# Patient Record
Sex: FEMALE | Race: WHITE | Employment: FULL TIME | URBAN - METROPOLITAN AREA
[De-identification: names, ages, dates, MRNs, and addresses within clinical notes are randomized per-mention and may not be internally consistent; named-entity substitution may affect disease eponyms.]

---

## 2017-01-06 ENCOUNTER — ALLIED HEALTH/NURSE VISIT (OUTPATIENT)
Dept: FAMILY MEDICINE | Facility: CLINIC | Age: 32
End: 2017-01-06
Payer: COMMERCIAL

## 2017-01-06 VITALS
BODY MASS INDEX: 32.59 KG/M2 | WEIGHT: 202.8 LBS | DIASTOLIC BLOOD PRESSURE: 78 MMHG | SYSTOLIC BLOOD PRESSURE: 132 MMHG | HEIGHT: 66 IN

## 2017-01-06 DIAGNOSIS — N91.2 AMENORRHEA: Primary | ICD-10-CM

## 2017-01-06 LAB — BETA HCG QUAL IFA URINE: POSITIVE

## 2017-01-06 PROCEDURE — 99207 ZZC NO CHARGE NURSE ONLY: CPT

## 2017-01-06 PROCEDURE — 84703 CHORIONIC GONADOTROPIN ASSAY: CPT | Mod: 90 | Performed by: FAMILY MEDICINE

## 2017-01-06 PROCEDURE — 99000 SPECIMEN HANDLING OFFICE-LAB: CPT | Performed by: FAMILY MEDICINE

## 2017-01-06 RX ORDER — PRENATAL VIT/IRON FUM/FOLIC AC 27MG-0.8MG
1 TABLET ORAL DAILY
Qty: 100 TABLET | Refills: 3 | COMMUNITY
Start: 2017-01-06 | End: 2017-02-20

## 2017-01-06 NOTE — NURSING NOTE
Shari Arshad is a 31 year old here today for a pregnancy test.  LMP: Patient's last menstrual period was 2016 (exact date).  Wt: 202 lbs 12.8 oz.    Symptoms include breast tenderness, absence of menses, fatigue, constipation, mild craming and heartburn.  Craving pickles and cheese.    Shari informed of positive pregnancy test results. MIKIE: 17    Educational advice given: nutrition, smoking and drugs & alcohol.  Smokes has been cutting back, is hoping to be done by Monday.    Current medications reviewed: Yes    Previous pregnancy history remarkable for: N/A.    Plan: schedule appointment with OB Educator and/or OB class on way out of clinic, follow-up appointment with Dr. Mcdonald for pre-syl care, take multivitamin or pre-syl vitamins and OB Education packet given.    She is to call back if she has any questions or concerns.  She is advised to notify a provider immediately if she experiences any severe cramping or abdominal pain or any vaginal bleeding.    Routing to Dr. Mcdonald as an FYI.    Bud Lorenzo RN, BSN

## 2017-01-09 ENCOUNTER — PRENATAL OFFICE VISIT (OUTPATIENT)
Dept: FAMILY MEDICINE | Facility: CLINIC | Age: 32
End: 2017-01-09
Payer: COMMERCIAL

## 2017-01-09 VITALS — BODY MASS INDEX: 34.16 KG/M2 | HEIGHT: 65 IN | WEIGHT: 205 LBS

## 2017-01-09 DIAGNOSIS — Z34.00 PREGNANCY, FIRST: Primary | ICD-10-CM

## 2017-01-09 PROBLEM — Z29.89 NEED FOR PROPHYLACTIC IMMUNOTHERAPY: Status: ACTIVE | Noted: 2017-01-09

## 2017-01-09 LAB
ALBUMIN UR-MCNC: NEGATIVE MG/DL
APPEARANCE UR: CLEAR
BILIRUB UR QL STRIP: NEGATIVE
COLOR UR AUTO: YELLOW
ERYTHROCYTE [DISTWIDTH] IN BLOOD BY AUTOMATED COUNT: 12.6 % (ref 10–15)
GLUCOSE UR STRIP-MCNC: NEGATIVE MG/DL
HCT VFR BLD AUTO: 43.1 % (ref 35–47)
HGB BLD-MCNC: 14.6 G/DL (ref 11.7–15.7)
HGB UR QL STRIP: NEGATIVE
KETONES UR STRIP-MCNC: NEGATIVE MG/DL
LEUKOCYTE ESTERASE UR QL STRIP: NEGATIVE
MCH RBC QN AUTO: 32.4 PG (ref 26.5–33)
MCHC RBC AUTO-ENTMCNC: 33.9 G/DL (ref 31.5–36.5)
MCV RBC AUTO: 96 FL (ref 78–100)
NITRATE UR QL: NEGATIVE
PH UR STRIP: 6.5 PH (ref 5–7)
PLATELET # BLD AUTO: 221 10E9/L (ref 150–450)
RBC # BLD AUTO: 4.5 10E12/L (ref 3.8–5.2)
SP GR UR STRIP: 1.01 (ref 1–1.03)
URN SPEC COLLECT METH UR: NORMAL
UROBILINOGEN UR STRIP-ACNC: 0.2 EU/DL (ref 0.2–1)
WBC # BLD AUTO: 10.5 10E9/L (ref 4–11)

## 2017-01-09 PROCEDURE — 81003 URINALYSIS AUTO W/O SCOPE: CPT | Mod: 90 | Performed by: FAMILY MEDICINE

## 2017-01-09 PROCEDURE — 86762 RUBELLA ANTIBODY: CPT | Mod: 90 | Performed by: FAMILY MEDICINE

## 2017-01-09 PROCEDURE — 86900 BLOOD TYPING SEROLOGIC ABO: CPT | Mod: 90 | Performed by: FAMILY MEDICINE

## 2017-01-09 PROCEDURE — 99000 SPECIMEN HANDLING OFFICE-LAB: CPT | Performed by: FAMILY MEDICINE

## 2017-01-09 PROCEDURE — 99207 ZZC NO CHARGE NURSE ONLY: CPT

## 2017-01-09 PROCEDURE — 87086 URINE CULTURE/COLONY COUNT: CPT | Mod: 90 | Performed by: FAMILY MEDICINE

## 2017-01-09 PROCEDURE — 87389 HIV-1 AG W/HIV-1&-2 AB AG IA: CPT | Mod: 90 | Performed by: FAMILY MEDICINE

## 2017-01-09 PROCEDURE — 86901 BLOOD TYPING SEROLOGIC RH(D): CPT | Mod: 90 | Performed by: FAMILY MEDICINE

## 2017-01-09 PROCEDURE — 86850 RBC ANTIBODY SCREEN: CPT | Mod: 90 | Performed by: FAMILY MEDICINE

## 2017-01-09 PROCEDURE — 36415 COLL VENOUS BLD VENIPUNCTURE: CPT | Performed by: FAMILY MEDICINE

## 2017-01-09 PROCEDURE — 87340 HEPATITIS B SURFACE AG IA: CPT | Mod: 90 | Performed by: FAMILY MEDICINE

## 2017-01-09 PROCEDURE — 86780 TREPONEMA PALLIDUM: CPT | Mod: 90 | Performed by: FAMILY MEDICINE

## 2017-01-09 PROCEDURE — 85027 COMPLETE CBC AUTOMATED: CPT | Mod: 90 | Performed by: FAMILY MEDICINE

## 2017-01-09 NOTE — PROGRESS NOTES
1. Have you had chicken pox?   Yes    Genetic Screening    Has the patient, baby's father, or anyone in either family had:     1. Thalassemia and and MCV result less than 80?No   2.  Neural tube defect? No   3.  Congenital heart defect?No   4. Down's syndrome?No   5.  Erich-Sachs disease?No   6. Sickle Cell disease or trait?No   7. Hemophilia or other inherited problems of blood?No   8. Muscular dystropy?No   9.  Cystic fibrosis?No  10. Axis's Chorea?No  11. Mental Retardation or autism?  No         If yes, was the person tested for Fragile X?   12. Any other inherited genetic or chromosomal disorders?No  13. Metabolic disorders such as diabetes or PKU?No  14. A child born with defects not listed above?  Shari's sister had multiple fractures of her foot in early childhood.  Unknown cause.  Her foot was removed age 2.    15. Recurrent pregnancy loss or stillbirth?No  16.  Has the patient had any medications/street drugs/alcohol since her last menstrual period?No  18.  Does the patient or baby's father have any other genetic risks. No    Shari's cousin had twins.

## 2017-01-10 LAB
ABO + RH BLD: NORMAL
ABO + RH BLD: NORMAL
BLD GP AB SCN SERPL QL: NORMAL
BLOOD BANK CMNT PATIENT-IMP: NORMAL
SPECIMEN EXP DATE BLD: NORMAL

## 2017-01-11 ENCOUNTER — MYC MEDICAL ADVICE (OUTPATIENT)
Dept: FAMILY MEDICINE | Facility: CLINIC | Age: 32
End: 2017-01-11

## 2017-01-11 LAB
BACTERIA SPEC CULT: NORMAL
HBV SURFACE AG SERPL QL IA: NONREACTIVE
HIV 1+2 AB+HIV1 P24 AG SERPL QL IA: NORMAL
MICRO REPORT STATUS: NORMAL
RUBV IGG SERPL IA-ACNC: 17 IU/ML
SPECIMEN SOURCE: NORMAL
T PALLIDUM IGG+IGM SER QL: NEGATIVE

## 2017-01-31 ENCOUNTER — HOSPITAL ENCOUNTER (OUTPATIENT)
Dept: ULTRASOUND IMAGING | Facility: CLINIC | Age: 32
Discharge: HOME | End: 2017-01-31
Attending: FAMILY MEDICINE | Admitting: FAMILY MEDICINE
Payer: COMMERCIAL

## 2017-01-31 DIAGNOSIS — Z34.00 PREGNANCY, FIRST: ICD-10-CM

## 2017-01-31 PROCEDURE — 76801 OB US < 14 WKS SINGLE FETUS: CPT

## 2017-02-20 ENCOUNTER — PRENATAL OFFICE VISIT (OUTPATIENT)
Dept: FAMILY MEDICINE | Facility: CLINIC | Age: 32
End: 2017-02-20
Payer: COMMERCIAL

## 2017-02-20 ENCOUNTER — TELEPHONE (OUTPATIENT)
Dept: FAMILY MEDICINE | Facility: CLINIC | Age: 32
End: 2017-02-20

## 2017-02-20 VITALS
TEMPERATURE: 98.1 F | DIASTOLIC BLOOD PRESSURE: 80 MMHG | OXYGEN SATURATION: 99 % | BODY MASS INDEX: 35.28 KG/M2 | WEIGHT: 212 LBS | HEART RATE: 77 BPM | SYSTOLIC BLOOD PRESSURE: 117 MMHG | RESPIRATION RATE: 20 BRPM

## 2017-02-20 DIAGNOSIS — Z34.01 ENCOUNTER FOR SUPERVISION OF NORMAL FIRST PREGNANCY IN FIRST TRIMESTER: Primary | ICD-10-CM

## 2017-02-20 DIAGNOSIS — Z23 NEED FOR VACCINATION: ICD-10-CM

## 2017-02-20 DIAGNOSIS — Z23 NEED FOR PROPHYLACTIC VACCINATION AND INOCULATION AGAINST INFLUENZA: ICD-10-CM

## 2017-02-20 PROBLEM — Z34.00 SUPERVISION OF NORMAL FIRST PREGNANCY: Status: ACTIVE | Noted: 2017-02-20

## 2017-02-20 PROCEDURE — 90471 IMMUNIZATION ADMIN: CPT | Performed by: FAMILY MEDICINE

## 2017-02-20 PROCEDURE — 99207 ZZC FIRST OB VISIT: CPT | Mod: 25 | Performed by: FAMILY MEDICINE

## 2017-02-20 PROCEDURE — 90688 IIV4 VACCINE SPLT 0.5 ML IM: CPT | Performed by: FAMILY MEDICINE

## 2017-02-20 RX ORDER — PRENATAL VIT/IRON FUM/FOLIC AC 27MG-0.8MG
1 TABLET ORAL DAILY
Qty: 100 TABLET | Refills: 3 | Status: SHIPPED | OUTPATIENT
Start: 2017-02-20 | End: 2018-02-21

## 2017-02-20 ASSESSMENT — PAIN SCALES - GENERAL: PAINLEVEL: NO PAIN (0)

## 2017-02-20 NOTE — MR AVS SNAPSHOT
After Visit Summary   2/20/2017    Shari Arshad    MRN: [id number]           Patient Information     Date Of Birth          1985        Visit Information        Provider Department      2/20/2017 4:00 PM Zechariah Mcdonald MD [facility]        Today's Diagnoses     Encounter for supervision of normal first pregnancy in first trimester    -  1    Need for vaccination        Need for prophylactic vaccination and inoculation against influenza           Follow-ups after your visit        Your next 10 appointments already scheduled     Mar 28, 2017  3:40 PM CDT   ESTABLISHED PRENATAL with Pratibha Ruiz Mai, MD   [facility] ([facility])    [address] [place] [id number]   [phone]            Apr 25, 2017  1:00 PM CDT   ESTABLISHED PRENATAL with Zechariah Mcdonald MD   [facility] ([facility])    [address] [place] [id number]   [phone]              Future tests that were ordered for you today     Open Future Orders        Priority Expected Expires Ordered    Maternal quad screen Routine 4/3/2017 4/3/2017 2/20/2017            Who to contact     If you have questions or need follow up information about today's clinic visit or your schedule please contact [facility] directly at [phone].  Normal or non-critical lab and imaging results will be communicated to you by Aylahart, letter or phone within 4 business days after the clinic has received the results. If you do not hear from us within 7 days, please contact the clinic through Aylahart or phone. If you have a critical or abnormal lab result, we will notify you by phone as soon as possible.  Submit refill requests through [employer] or call your pharmacy and they will forward the refill request to us. Please allow 3 business days for your refill to be completed.          Additional Information About Your Visit        [facility]  Information     [employer] gives you secure access to your electronic health record. If you see a primary care provider, you can also send messages to your care team and make appointments. If you have questions, please call your primary care clinic.  If you do not have a primary care provider, please call [phone] and they will assist you.        Care EveryWhere ID     This is your Care EveryWhere ID. This could be used by other organizations to access your [facility] medical records  [id number]        Your Vitals Were     Pulse Temperature Respirations Last Period Pulse Oximetry BMI (Body Mass Index)    77 98.1  F (36.7  C) (Temporal) 20 12/06/2016 (Exact Date) 99% 35.28 kg/m2       Blood Pressure from Last 3 Encounters:   02/20/17 117/80   01/06/17 132/78   08/11/16 134/86    Weight from Last 3 Encounters:   02/20/17 212 lb (96.2 kg)   01/09/17 205 lb (93 kg)   01/06/17 202 lb 12.8 oz (92 kg)              We Performed the Following     FLU VACCINE, 3 YRS +, IM (QUADRIVALENT W/PRESERVATIVES/MULTI-DOSE) [85008]     Vaccine Administration, Initial [59746]          Today's Medication Changes          These changes are accurate as of: 2/20/17  8:22 PM.  If you have any questions, ask your nurse or doctor.               Stop taking these medicines if you haven't already. Please contact your care team if you have questions.     psyllium 0.52 G capsule   Stopped by:  Zechariah Mcdonald MD                Where to get your medicines      These medications were sent to [id number] - [place] - [address]  [address] [place] [address]     Phone:  [phone]     prenatal multivitamin  plus iron 27-0.8 MG Tabs per tablet                Primary Care Provider    None Specified       No primary provider on file.        Thank you!     Thank you for choosing [facility]  for your care. Our goal is always to provide you with excellent care. Hearing back from our patients is one way we can continue to  improve our services. Please take a few minutes to complete the written survey that you may receive in the mail after your visit with us. Thank you!             Your Updated Medication List - Protect others around you: Learn how to safely use, store and throw away your medicines at www.disposemymeds.org.          This list is accurate as of: 2/20/17  8:22 PM.  Always use your most recent med list.                   Brand Name Dispense Instructions for use    prenatal multivitamin  plus iron 27-0.8 MG Tabs per tablet     100 tablet    Take 1 tablet by mouth daily

## 2017-02-20 NOTE — PROGRESS NOTES
INITIAL OB ASSESSMENT                     Date: 2017  Age: 31 year old   Plan Number: [id number]  Name: Shari Arshad    : 1985  Phone: [phone] (home)  Marital Status:co-habitating,    Race/Ethnicity:    PC Provider:  No primary care provider on file.    OB Physician: AF   Partner's Name: Errol        See Specialty History for details.    LMP: 16, Cycle length: 28-30 days  LMP Certain?:Yes  LMP Normal?: Yes     Allergies   Allergen Reactions     Fruit Extracts Hives     Fruits from trees only if she touches them     Milk [Lac Bovis] GI Disturbance     No Known Drug Allergies         Current Outpatient Prescriptions   Medication Sig Dispense Refill     Prenatal Vit-Fe Fumarate-FA (PRENATAL MULTIVITAMIN  PLUS IRON) 27-0.8 MG TABS per tablet Take 1 tablet by mouth daily 100 tablet 3     Social History: Benign, No substance abuse, environmental exposures, mental health risks and No financial concerns,pets. Partner support.     Past Medical History of Father of Baby:   No significant medical history     Past Medical History of Patient:  Past Medical History   Diagnosis Date     Gastro-oesophageal reflux disease      Hypertension      Lactose intolerance      Rh(-), needs rhogam at 28 wks gestation 2017     Surgical History:       Past Surgical History   Procedure Laterality Date     Hc excis primary ganglion wrist  3/24/2004     Excision, volar wrist ganglion, left.     Hc excis recurrent ganglion wrist  2006     Recurrent volar wrist ganglion, left.     Open reduction internal fixation wrist       Cadavar bone     Family History:   Family History   Problem Relation Age of Onset     Lipids Mother      Musculoskeletal Disorder Mother      ms     Allergies Mother      Depression Mother      Genitourinary Problems Mother      Respiratory Mother      CPAP for sleep apnea     Multiple Sclerosis Mother      Hypertension Father      Lipids Father      Hypertension Paternal Grandmother       Hypertension Paternal Grandfather      CEREBROVASCULAR DISEASE Maternal Grandmother      CANCER Maternal Grandmother      ovarian     Gynecology Maternal Grandmother      ovarian ca     Asthma Sister      Psychotic Disorder Sister      depression (s/p an amputation at age 3)     Respiratory Sister      CPAP for sleep apnea     DIABETES Sister      adult-onset     DIABETES Maternal Uncle      type 2     Genetic History: No known genectic disease in 1st or 2nd degree relatives and No known genectic disease in FOB's 1st ot 2nd degree relatives     Review of Systems: General: Benign and Pre-pregnancy weight 205#;  Cardiovascular:Benign,    Respiratory: Benign;  Gastrointestinal Benign;  Genitourinary: Benign;  Neuromuscular: Benign;   Endocrine: Benign other than feeling tired;  Dermatologic Benign;   Psychiatric: Benign.     History Since Last Menstrual Period: No Problems      Physical Exam: General: Well developed, well nourished female;   Skin: Normal;   HEENT: PERRLA, EOMI, fundi benign;   Neck: Supple, no adenopathy and thyroid normal;   Chest: Clear to auscultation;   Heart: Regular rate, rhythm and No murmur, rub, gallop;  Breasts: Not examined;   Abdomen: Benign, Soft, flat, non-tender, No masses, organomegaly, No inguinal nodes, Bowel sounds normoactive and FHR in the 170s;   Extremities: Normal and No edema;  Neurological: Normal;   Perineum: Intact;   Vulva: Normal genitalia;  Vagina: Normal mucosa, no discharge;   Cervix: Nulliparous, closed, mobile,  no discharge and Length 5 cm;  Uterus: 11-12 weeks, Normal shape, position and consistency, Anteflexed and Nontender;   Adnexa: No masses, nodularity, tenderness;  Rectum: NE;   Bony Pelvis: Adequate and Gynecoid.   Pelvimetry: Diagonal Conjugate: 10 cm, Pubic angle >90 degrees, Ischial spines flat and Interspinal distance:  9 cm    Assessment:   IUP in a primiparous patient happy to be pregnant.  Smoking but has cut down to about 4 per day and will quit  altogether.      Plan:  Follow up in 5 weeks.  Normal exercise.  Normal sexual activity.  Prenatal vitamins.  Anticipated weight gain.  Discussed AFP quad screen and when it would be drawn (16 wks).  Patient will discuss this with her partner and decide whether or not she wants it done. She thinks that she will have it done.     Electronically signed by:  Zechariah Mcdonald M.D.  2/20/2017

## 2017-02-20 NOTE — NURSING NOTE
"Chief Complaint   Patient presents with     Prenatal Care     10 weeks 6 days       Initial /80 (BP Location: Right arm, Patient Position: Chair, Cuff Size: Adult Large)  Pulse 77  Temp 98.1  F (36.7  C) (Temporal)  Resp 20  Wt 212 lb (96.2 kg)  LMP 12/06/2016 (Exact Date)  SpO2 99%  BMI 35.28 kg/m2 Estimated body mass index is 35.28 kg/(m^2) as calculated from the following:    Height as of 1/9/17: 5' 5\" (1.651 m).    Weight as of this encounter: 212 lb (96.2 kg).  Medication Reconciliation: complete     Altagracia Stephenson CMA      "

## 2017-02-20 NOTE — TELEPHONE ENCOUNTER
Patient is wondering if you will prescribe her some prenatal vitamins so she can use her flex spending account money. They do not allow her to use it for OTC medications.    She would like them sent to [facility].    Altagracia Stephenson, [facility]

## 2017-02-20 NOTE — PROGRESS NOTES
Injectable Influenza Immunization Documentation    1.  Is the person to be vaccinated sick today?  No    2. Does the person to be vaccinated have an allergy to eggs or to a component of the vaccine?  No    3. Has the person to be vaccinated today ever had a serious reaction to influenza vaccine in the past?  No    4. Has the person to be vaccinated ever had Guillain-[place] syndrome?  No     Form completed by Altagracia Stephenson CMA

## 2017-03-28 ENCOUNTER — PRENATAL OFFICE VISIT (OUTPATIENT)
Dept: FAMILY MEDICINE | Facility: CLINIC | Age: 32
End: 2017-03-28
Payer: COMMERCIAL

## 2017-03-28 VITALS
TEMPERATURE: 97.3 F | RESPIRATION RATE: 14 BRPM | DIASTOLIC BLOOD PRESSURE: 72 MMHG | HEART RATE: 69 BPM | OXYGEN SATURATION: 96 % | BODY MASS INDEX: 35.61 KG/M2 | WEIGHT: 214 LBS | SYSTOLIC BLOOD PRESSURE: 120 MMHG

## 2017-03-28 DIAGNOSIS — K21.9 GASTROESOPHAGEAL REFLUX DISEASE WITHOUT ESOPHAGITIS: ICD-10-CM

## 2017-03-28 DIAGNOSIS — Z34.01 ENCOUNTER FOR SUPERVISION OF NORMAL FIRST PREGNANCY IN FIRST TRIMESTER: ICD-10-CM

## 2017-03-28 DIAGNOSIS — Z34.92 PRENATAL CARE, SECOND TRIMESTER: Primary | ICD-10-CM

## 2017-03-28 PROCEDURE — 99000 SPECIMEN HANDLING OFFICE-LAB: CPT | Performed by: FAMILY MEDICINE

## 2017-03-28 PROCEDURE — 81511 FTL CGEN ABNOR FOUR ANAL: CPT | Mod: 90 | Performed by: FAMILY MEDICINE

## 2017-03-28 PROCEDURE — 36415 COLL VENOUS BLD VENIPUNCTURE: CPT | Performed by: FAMILY MEDICINE

## 2017-03-28 PROCEDURE — 99207 ZZC PRENATAL VISIT: CPT | Performed by: FAMILY MEDICINE

## 2017-03-28 ASSESSMENT — PAIN SCALES - GENERAL: PAINLEVEL: NO PAIN (0)

## 2017-03-28 NOTE — PROGRESS NOTES
Doing well and she has no concern.  Started to feel some fetal movement.  No cramping or abnormal discharge. No N/V with normal appetite. No UTI symptoms or bleeding. Has GERD and sameer is no longer effective. No other concern today.    Reviewed the prenatal flow sheet with her    Overall she is doing well.  Continue with current care; continue with prenatal vitamin daily and drinks a lot of water.  Educate about what to expect in the next 4 weeks and symptoms to call in or be seen.  Normal activities as tolerated.  Will get the Quad screen test today.  Set up for ultrasound for anatomy survey and recommended to get it done around week 20 to 22.  Follow up in 4 week with Dr. Mcdonald, earlier as needed.   All of her questions answered.

## 2017-03-28 NOTE — NURSING NOTE
"Chief Complaint   Patient presents with     Prenatal Care       Initial /72 (BP Location: Left arm, Patient Position: Chair, Cuff Size: Adult Regular)  Pulse 69  Temp 97.3  F (36.3  C) (Temporal)  Resp 14  Wt 214 lb (97.1 kg)  LMP 12/06/2016 (Exact Date)  SpO2 96%  BMI 35.61 kg/m2 Estimated body mass index is 35.61 kg/(m^2) as calculated from the following:    Height as of 1/9/17: 5' 5\" (1.651 m).    Weight as of this encounter: 214 lb (97.1 kg).  Medication Reconciliation: complete   Quyen Hamilton MA 3/28/2017        "

## 2017-03-30 LAB
# FETUSES US: NORMAL
AFP ADJ MOM AMN: 1.35
AFP SERPL-MCNC: 35 NG/ML
AGE - REPORTED: 32.8
DATING METHOD: NORMAL
DIABETIC AT CONCEPTION: NO
FAMILY MEMBER DISEASES HX: NO
FAMILY MEMBER DISEASES HX: NO
GA METHOD: NORMAL
GA: 16 WK
HCG MOM SERPL: 0.6
HCG SERPL-ACNC: NORMAL M[IU]/ML
HX OF HEREDITARY DISORDERS: NO
IDDM PATIENT QL: NO
INHIBIN A MOM SERPL: 0.92
INHIBIN A SERPL-MCNC: 136
INTEGRATED SCN PATIENT-IMP: NORMAL
LMP START DATE: NORMAL
PATHOLOGY STUDY: NORMAL
PREV HX CHROMOSOME ABNORMALITY: NO
SPECIMEN DRAWN SERPL: NORMAL
TWINS: NO
U ESTRIOL MOM SERPL: 1
U ESTRIOL SERPL-MCNC: 0.77 NG/ML

## 2017-04-25 ENCOUNTER — PRENATAL OFFICE VISIT (OUTPATIENT)
Dept: FAMILY MEDICINE | Facility: CLINIC | Age: 32
End: 2017-04-25
Payer: COMMERCIAL

## 2017-04-25 ENCOUNTER — HOSPITAL ENCOUNTER (OUTPATIENT)
Dept: ULTRASOUND IMAGING | Facility: CLINIC | Age: 32
Discharge: HOME | End: 2017-04-25
Attending: FAMILY MEDICINE | Admitting: FAMILY MEDICINE
Payer: COMMERCIAL

## 2017-04-25 VITALS
TEMPERATURE: 98.1 F | WEIGHT: 220.12 LBS | HEART RATE: 86 BPM | HEIGHT: 65 IN | DIASTOLIC BLOOD PRESSURE: 66 MMHG | SYSTOLIC BLOOD PRESSURE: 112 MMHG | OXYGEN SATURATION: 98 % | RESPIRATION RATE: 18 BRPM | BODY MASS INDEX: 36.67 KG/M2

## 2017-04-25 DIAGNOSIS — Z34.92 PRENATAL CARE, SECOND TRIMESTER: ICD-10-CM

## 2017-04-25 DIAGNOSIS — Z34.02 ENCOUNTER FOR SUPERVISION OF NORMAL FIRST PREGNANCY IN SECOND TRIMESTER: Primary | ICD-10-CM

## 2017-04-25 PROCEDURE — 76805 OB US >/= 14 WKS SNGL FETUS: CPT

## 2017-04-25 PROCEDURE — 99207 ZZC PRENATAL VISIT: CPT | Performed by: FAMILY MEDICINE

## 2017-04-25 NOTE — NURSING NOTE
"Chief Complaint   Patient presents with     RECHECK     OB 20 weeks 0 days       Initial /66  Pulse 86  Temp 98.1  F (36.7  C) (Temporal)  Resp 18  Ht 5' 5\" (1.651 m)  Wt 220 lb 1.9 oz (99.8 kg)  LMP 12/06/2016 (Exact Date)  SpO2 98%  BMI 36.63 kg/m2 Estimated body mass index is 36.63 kg/(m^2) as calculated from the following:    Height as of this encounter: 5' 5\" (1.651 m).    Weight as of this encounter: 220 lb 1.9 oz (99.8 kg).  Medication Reconciliation: incomplete   Ashish FALL CMA      "

## 2017-04-25 NOTE — PROGRESS NOTES
Taking PNVs, no problems. Having a baby girl.  1 hr glucola treponema and hgb at her next visit.  Instructed on not to eat or drink anything more than water for at least 2 hours prior to the test.   She will need her rhogam the following month at 28 wks.  She has been feeling a few fetal movements over the past few days.  We did review her AFP quad screen.  It was completely normal.      Electronically signed by:  Zechariah Mcdonald M.D.  4/25/2017

## 2017-04-25 NOTE — MR AVS SNAPSHOT
"              After Visit Summary   2017    Shari Arshad    MRN: [id number]           Patient Information     Date Of Birth          1985        Visit Information        Provider Department      2017 1:00 PM Zechariah Mcdonald MD [facility]        Today's Diagnoses     Encounter for supervision of normal first pregnancy in second trimester    -  1       Follow-ups after your visit        Who to contact     If you have questions or need follow up information about today's clinic visit or your schedule please contact [facility] directly at [phone].  Normal or non-critical lab and imaging results will be communicated to you by Subtextualhart, letter or phone within 4 business days after the clinic has received the results. If you do not hear from us within 7 days, please contact the clinic through Subtextualhart or phone. If you have a critical or abnormal lab result, we will notify you by phone as soon as possible.  Submit refill requests through [employer] or call your pharmacy and they will forward the refill request to us. Please allow 3 business days for your refill to be completed.          Additional Information About Your Visit        MyChart Information     [employer] lets you send messages to your doctor, view your test results, renew your prescriptions, schedule appointments and more. To sign up, go to [email or web address] . Click on \"Log in\" on the left side of the screen, which will take you to the Welcome page. Then click on \"Sign up Now\" on the right side of the page.     You will be asked to enter the access code listed below, as well as some personal information. Please follow the directions to create your username and password.     Your access code is: [email or web address]  Expires: 2017  1:14 PM     Your access code will  in 90 days. If you need help or a new code, please call your [facility] or [phone].        Care EveryWhere ID     This is your " "Care EveryWhere ID. This could be used by other organizations to access your [facility] medical records  [id number]        Your Vitals Were     Pulse Temperature Respirations Height Last Period Pulse Oximetry    86 98.1  F (36.7  C) (Temporal) 18 5' 5\" (1.651 m) 12/06/2016 (Exact Date) 98%    BMI (Body Mass Index)                   36.63 kg/m2            Blood Pressure from Last 3 Encounters:   04/25/17 112/66   03/28/17 120/72   02/20/17 117/80    Weight from Last 3 Encounters:   04/25/17 220 lb 1.9 oz (99.8 kg)   03/28/17 214 lb (97.1 kg)   02/20/17 212 lb (96.2 kg)              Today, you had the following     No orders found for display       Primary Care Provider    None Specified       No primary provider on file.        Thank you!     Thank you for choosing [facility]  for your care. Our goal is always to provide you with excellent care. Hearing back from our patients is one way we can continue to improve our services. Please take a few minutes to complete the written survey that you may receive in the mail after your visit with us. Thank you!             Your Updated Medication List - Protect others around you: Learn how to safely use, store and throw away your medicines at www.disposemymeds.org.          This list is accurate as of: 4/25/17  1:14 PM.  Always use your most recent med list.                   Brand Name Dispense Instructions for use    prenatal multivitamin  plus iron 27-0.8 MG Tabs per tablet     100 tablet    Take 1 tablet by mouth daily       ranitidine 150 MG tablet    ZANTAC    60 tablet    Take 1 tablet (150 mg) by mouth 2 times daily         "

## 2017-05-20 ENCOUNTER — MYC REFILL (OUTPATIENT)
Dept: FAMILY MEDICINE | Facility: CLINIC | Age: 32
End: 2017-05-20

## 2017-05-20 DIAGNOSIS — Z34.92 PRENATAL CARE, SECOND TRIMESTER: ICD-10-CM

## 2017-05-20 DIAGNOSIS — K21.9 ESOPHAGEAL REFLUX: Primary | ICD-10-CM

## 2017-05-22 NOTE — TELEPHONE ENCOUNTER
Message from Razient[employer]:  Original authorizing provider: MD Shari Arriaga Mai would like a refill of the following medications:  ranitidine (ZANTAC) 150 MG tablet [Pratibha Ruiz Mai, MD]    Preferred pharmacy: [address]     Comment:

## 2017-05-22 NOTE — TELEPHONE ENCOUNTER
zantac      Last Written Prescription Date:  3/28/17  Last Fill Quantity: 60,   # refills: 3  Last Office Visit with [facility], P or [facility] prescribing provider: 4/25/17  Future Office visit:    Next 5 appointments (look out 90 days)     May 23, 2017  3:00 PM CDT   ESTABLISHED PRENATAL with Zechariah Mcdonald MD   [facility]    [address] [place] [phone]   [phone]            Jun 20, 2017  3:00 PM CDT   ESTABLISHED PRENATAL with Zechariah Mcdonald MD   [facility] ([facility])    [address] [place] [phone]   [phone]            Jul 18, 2017  2:40 PM CDT   ESTABLISHED PRENATAL with Zechariah Mcdonald MD   [facility] ([facility])    [address] [place] [phone]   [phone]

## 2017-05-22 NOTE — TELEPHONE ENCOUNTER
Prescription approved per [facility] Refill Protocol.............TONO Jones

## 2017-05-23 ENCOUNTER — PRENATAL OFFICE VISIT (OUTPATIENT)
Dept: FAMILY MEDICINE | Facility: CLINIC | Age: 32
End: 2017-05-23
Payer: COMMERCIAL

## 2017-05-23 VITALS
TEMPERATURE: 97.8 F | BODY MASS INDEX: 37.99 KG/M2 | WEIGHT: 228 LBS | SYSTOLIC BLOOD PRESSURE: 116 MMHG | HEIGHT: 65 IN | OXYGEN SATURATION: 98 % | DIASTOLIC BLOOD PRESSURE: 70 MMHG | HEART RATE: 94 BPM

## 2017-05-23 DIAGNOSIS — Z34.02 ENCOUNTER FOR SUPERVISION OF NORMAL FIRST PREGNANCY IN SECOND TRIMESTER: ICD-10-CM

## 2017-05-23 DIAGNOSIS — Z29.89 NEED FOR PROPHYLACTIC IMMUNOTHERAPY: Primary | ICD-10-CM

## 2017-05-23 DIAGNOSIS — K21.9 GASTROESOPHAGEAL REFLUX DISEASE WITHOUT ESOPHAGITIS: ICD-10-CM

## 2017-05-23 LAB
GLUCOSE 1H P 50 G GLC PO SERPL-MCNC: 118 MG/DL (ref 60–129)
HGB BLD-MCNC: 12.4 G/DL (ref 11.7–15.7)

## 2017-05-23 PROCEDURE — 99207 ZZC PRENATAL VISIT: CPT | Performed by: FAMILY MEDICINE

## 2017-05-23 PROCEDURE — 36415 COLL VENOUS BLD VENIPUNCTURE: CPT | Performed by: FAMILY MEDICINE

## 2017-05-23 PROCEDURE — 82950 GLUCOSE TEST: CPT | Performed by: FAMILY MEDICINE

## 2017-05-23 PROCEDURE — 00000218 ZZHCL STATISTIC OBHBG - HEMOGLOBIN: Performed by: FAMILY MEDICINE

## 2017-05-23 PROCEDURE — 86780 TREPONEMA PALLIDUM: CPT | Performed by: FAMILY MEDICINE

## 2017-05-23 NOTE — PROGRESS NOTES
Shari Arshad is a 31 year old  at 24w0d here for return OB visit.  Patient is doing well, feeling good FM. No VB, LOF. No cramping or contractions. Taking PNVs. Has some heartburn but taking Zantac with good relief.  - 1 hour GCT today  - Rhogam at next visit  - Call the clinic at or come to the [facility] if you develop any symptoms or preeclampsia, including: Severe headache, visual changes, chest pain, shortness of breath, pain in the upper right abdomen, or sudden increase in swelling.  - Return to clinic or call if greater than 6 ctx's per hour, LOF, or increased pelvic pressure. Call if any questions.    Patient was seen and examined by myself and Dr. Mcdonald.  The note was then scribed by me.    Yolanda Tamayo, MS4    This patient was seen and examined by myself as well as the medical student.  The medical student scribed the note and I have reviewed it, edited it appropriately, and agree with the final documentation.     Electronically signed by:  Zechariah Mcdonald M.D.  2017

## 2017-05-23 NOTE — MR AVS SNAPSHOT
After Visit Summary   5/23/2017    Shari Arshad    MRN: [id number]           Patient Information     Date Of Birth          1985        Visit Information        Provider Department      5/23/2017 3:00 PM Zechariah Mcdonald MD [facility]        Today's Diagnoses     Rh(-), needs rhogam at 28 wks gestation    -  1    Encounter for supervision of normal first pregnancy in second trimester        Gastroesophageal reflux disease without esophagitis           Follow-ups after your visit        Your next 10 appointments already scheduled     Jun 20, 2017  3:00 PM CDT   ESTABLISHED PRENATAL with Zechariah Mcdonald MD   [facility]    [address] [place] [address] [phone]            Jul 18, 2017  2:40 PM CDT   ESTABLISHED PRENATAL with Zechariah Mcdonald MD   [facility] ([facility])    [address] [place] [id number] [phone]              Future tests that were ordered for you today     Open Future Orders        Priority Expected Expires Ordered    ABO/Rh type and screen Routine 6/27/2017 6/27/2017 5/23/2017            Who to contact     If you have questions or need follow up information about today's clinic visit or your schedule please contact [facility] directly at [phone].  Normal or non-critical lab and imaging results will be communicated to you by MyChart, letter or phone within 4 business days after the clinic has received the results. If you do not hear from us within 7 days, please contact the clinic through MyChart or phone. If you have a critical or abnormal lab result, we will notify you by phone as soon as possible.  Submit refill requests through [employer] or call your pharmacy and they will forward the refill request to us. Please allow 3 business days for your refill to be completed.          Additional Information About Your Visit    "     MyChart Information     The [email or web address] gives you secure access to your electronic health record. If you see a primary care provider, you can also send messages to your care team and make appointments. If you have questions, please call your primary care clinic.  If you do not have a primary care provider, please call [phone] and they will assist you.        Care EveryWhere ID     This is your Care EveryWhere ID. This could be used by other organizations to access your [place] medical records  [id number]        Your Vitals Were     Pulse Temperature Height Last Period Pulse Oximetry BMI (Body Mass Index)    94 97.8  F (36.6  C) (Temporal) 5' 5\" (1.651 m) 12/06/2016 (Exact Date) 98% 37.94 kg/m2       Blood Pressure from Last 3 Encounters:   05/23/17 116/70   04/25/17 112/66   03/28/17 120/72    Weight from Last 3 Encounters:   05/23/17 228 lb (103.4 kg)   04/25/17 220 lb 1.9 oz (99.8 kg)   03/28/17 214 lb (97.1 kg)              We Performed the Following     Anti Treponema     Glucose tolerance gest screen 1 hour     OB hemoglobin          Where to get your medicines      These medications were [facility] [place] [facility] [address] [facility] [address]     Phone:  [phone]     ranitidine 150 MG tablet          Primary Care Provider Office Phone # Fax #    Zechariah Mcdonald [phone]       [place] [address] [facility] [place] [address]        Thank you!     Thank you for choosing [facility]  for your care. Our goal is always to provide you with excellent care. Hearing back from our patients is one way we can continue to improve our services. Please take a few minutes to complete the written survey that you may receive in the mail after your visit with us. Thank you!             Your Updated Medication List - Protect others around you: Learn how to safely use, store and throw away your medicines at " www.disposemymeds.org.          This list is accurate as of: 5/23/17  3:41 PM.  Always use your most recent med list.                   Brand Name Dispense Instructions for use    prenatal multivitamin  plus iron 27-0.8 MG Tabs per tablet     100 tablet    Take 1 tablet by mouth daily       ranitidine 150 MG tablet    ZANTAC    60 tablet    Take 1 tablet (150 mg) by mouth 2 times daily

## 2017-05-24 LAB — T PALLIDUM IGG+IGM SER QL: NEGATIVE

## 2017-06-20 ENCOUNTER — PRENATAL OFFICE VISIT (OUTPATIENT)
Dept: FAMILY MEDICINE | Facility: CLINIC | Age: 32
End: 2017-06-20
Payer: COMMERCIAL

## 2017-06-20 VITALS
WEIGHT: 229 LBS | DIASTOLIC BLOOD PRESSURE: 74 MMHG | TEMPERATURE: 97.3 F | RESPIRATION RATE: 12 BRPM | SYSTOLIC BLOOD PRESSURE: 122 MMHG | OXYGEN SATURATION: 99 % | BODY MASS INDEX: 38.11 KG/M2 | HEART RATE: 97 BPM

## 2017-06-20 DIAGNOSIS — Z29.89 NEED FOR PROPHYLACTIC IMMUNOTHERAPY: ICD-10-CM

## 2017-06-20 DIAGNOSIS — Z34.03 ENCOUNTER FOR SUPERVISION OF NORMAL FIRST PREGNANCY IN THIRD TRIMESTER: Primary | ICD-10-CM

## 2017-06-20 DIAGNOSIS — O26.893 HEARTBURN DURING PREGNANCY IN THIRD TRIMESTER: ICD-10-CM

## 2017-06-20 DIAGNOSIS — R12 HEARTBURN DURING PREGNANCY IN THIRD TRIMESTER: ICD-10-CM

## 2017-06-20 PROCEDURE — 86900 BLOOD TYPING SEROLOGIC ABO: CPT | Performed by: FAMILY MEDICINE

## 2017-06-20 PROCEDURE — 86901 BLOOD TYPING SEROLOGIC RH(D): CPT | Performed by: FAMILY MEDICINE

## 2017-06-20 PROCEDURE — 86850 RBC ANTIBODY SCREEN: CPT | Performed by: FAMILY MEDICINE

## 2017-06-20 PROCEDURE — 96372 THER/PROPH/DIAG INJ SC/IM: CPT | Performed by: FAMILY MEDICINE

## 2017-06-20 PROCEDURE — 36415 COLL VENOUS BLD VENIPUNCTURE: CPT | Performed by: FAMILY MEDICINE

## 2017-06-20 PROCEDURE — 99207 ZZC PRENATAL VISIT: CPT | Mod: 25 | Performed by: FAMILY MEDICINE

## 2017-06-20 ASSESSMENT — PAIN SCALES - GENERAL: PAINLEVEL: NO PAIN (0)

## 2017-06-20 NOTE — PROGRESS NOTES
Taking PNVs, no problems. Rhogam today.  Will set up the rest of her appointments today.  Her heartburn is still daily on the zantac, so will add omeprazole.  She can continue using TUMs as needed.    Electronically signed by:  Zechariah Mcdonald M.D.  6/20/2017

## 2017-06-20 NOTE — NURSING NOTE
Prior to injection verified patient identity using patient's name and date of birth.   Patient instructed to remain in clinic for 20 minutes afterwards, and to report any adverse reaction to me immediately.  Lis Alvarenga MA

## 2017-06-20 NOTE — MR AVS SNAPSHOT
After Visit Summary   6/20/2017    Shari Arshad    MRN: [id number]           Patient Information     Date Of Birth          1985        Visit Information        Provider Department      6/20/2017 3:00 PM Zechariah Mcdonald MD [facility]        Today's Diagnoses     Encounter for supervision of normal first pregnancy in third trimester    -  1    Rh(-), needs rhogam at 28 wks gestation        Heartburn during pregnancy in third trimester           Follow-ups after your visit        Your next 10 appointments already scheduled     Jul 18, 2017  2:40 PM CDT   ESTABLISHED PRENATAL with Zechariah Mcdonald MD   [facility] ([address] [address]   [phone]            Aug 15, 2017  2:00 PM CDT   ESTABLISHED PRENATAL with Zechariah Mcdonald MD   [facility] ([address] [address]   [phone]            Aug 29, 2017  1:40 PM CDT   ESTABLISHED PRENATAL with Zechariah Mcdonald MD   [facility]    [address] [place] [address]   [phone]            Sep 07, 2017 10:50 AM CDT   ESTABLISHED PRENATAL with Zechariah Mcdonald MD   [facility] ([address] [address]   [phone]            Sep 12, 2017 11:10 AM CDT   ESTABLISHED PRENATAL with Zechariah Mcdonald MD   [facility] ([address] [address]   [phone]              Who to contact     If you have questions or need follow up information about today's clinic visit or your schedule please contact [facility] directly at [phone].  Normal or non-critical lab and imaging results will be communicated to you by MyChart, letter or phone within 4 business days after the clinic has  received the results. If you do not hear from us within 7 days, please contact the clinic through [facility] or phone. If you have a critical or abnormal lab result, we will notify you by phone as soon as possible.  Submit refill requests through [facility] or call your pharmacy and they will forward the refill request to us. Please allow 3 business days for your refill to be completed.          Additional Information About Your Visit        [employer] Information     [facility] gives you secure access to your electronic health record. If you see a primary care provider, you can also send messages to your care team and make appointments. If you have questions, please call your primary care clinic.  If you do not have a primary care provider, please call [phone] and they will assist you.        Care EveryWhere ID     This is your Care EveryWhere ID. This could be used by other organizations to access your [place] medical records  [id number]        Your Vitals Were     Pulse Temperature Respirations Last Period Pulse Oximetry BMI (Body Mass Index)    97 97.3  F (36.3  C) (Tympanic) 12 12/06/2016 (Exact Date) 99% 38.11 kg/m2       Blood Pressure from Last 3 Encounters:   06/20/17 122/74   05/23/17 116/70   04/25/17 112/66    Weight from Last 3 Encounters:   06/20/17 229 lb (103.9 kg)   05/23/17 228 lb (103.4 kg)   04/25/17 220 lb 1.9 oz (99.8 kg)              We Performed the Following     ABO/Rh type and screen          Today's Medication Changes          These changes are accurate as of: 6/20/17  3:54 PM.  If you have any questions, ask your nurse or doctor.               Start taking these medicines.        Dose/Directions    omeprazole 20 MG CR capsule   Commonly known as:  priLOSEC   Used for:  Heartburn during pregnancy in third trimester   Started by:  Zechariah Mcdonald MD        Dose:  20 mg   Take 1 capsule (20 mg) by mouth daily   Quantity:  90 capsule   Refills:  3            Where to get your medicines       [facility] - [address]  [address] [address]     Phone:  [phone]     omeprazole 20 MG CR capsule                Primary Care Provider Office Phone # Fax #    Zechariah Mcdonald [phone]       [facility] [address] [place] [address]        Thank you!     Thank you for choosing [facility]  for your care. Our goal is always to provide you with excellent care. Hearing back from our patients is one way we can continue to improve our services. Please take a few minutes to complete the written survey that you may receive in the mail after your visit with us. Thank you!             Your Updated Medication List - Protect others around you: Learn how to safely use, store and throw away your medicines at www.disposemymeds.org.          This list is accurate as of: 6/20/17  3:54 PM.  Always use your most recent med list.                   Brand Name Dispense Instructions for use    omeprazole 20 MG CR capsule    priLOSEC    90 capsule    Take 1 capsule (20 mg) by mouth daily       prenatal multivitamin  plus iron 27-0.8 MG Tabs per tablet     100 tablet    Take 1 tablet by mouth daily       ranitidine 150 MG tablet    ZANTAC    60 tablet    Take 1 tablet (150 mg) by mouth 2 times daily

## 2017-06-20 NOTE — NURSING NOTE
"Chief Complaint   Patient presents with     Prenatal Care   28w0d      Initial /74  Pulse 97  Temp 97.3  F (36.3  C) (Tympanic)  Resp 12  Wt 229 lb (103.9 kg)  LMP 12/06/2016 (Exact Date)  SpO2 99%  BMI 38.11 kg/m2 Estimated body mass index is 38.11 kg/(m^2) as calculated from the following:    Height as of 5/23/17: 5' 5\" (1.651 m).    Weight as of this encounter: 229 lb (103.9 kg).  Medication Reconciliation: complete    "

## 2017-07-19 ENCOUNTER — PRENATAL OFFICE VISIT (OUTPATIENT)
Dept: FAMILY MEDICINE | Facility: CLINIC | Age: 32
End: 2017-07-19
Payer: COMMERCIAL

## 2017-07-19 VITALS
BODY MASS INDEX: 39.61 KG/M2 | DIASTOLIC BLOOD PRESSURE: 70 MMHG | TEMPERATURE: 97.7 F | HEART RATE: 79 BPM | WEIGHT: 238 LBS | OXYGEN SATURATION: 98 % | RESPIRATION RATE: 12 BRPM | SYSTOLIC BLOOD PRESSURE: 124 MMHG

## 2017-07-19 DIAGNOSIS — Z34.03 ENCOUNTER FOR SUPERVISION OF NORMAL FIRST PREGNANCY IN THIRD TRIMESTER: Primary | ICD-10-CM

## 2017-07-19 DIAGNOSIS — Z23 ENCOUNTER FOR IMMUNIZATION: ICD-10-CM

## 2017-07-19 PROCEDURE — 90471 IMMUNIZATION ADMIN: CPT | Performed by: FAMILY MEDICINE

## 2017-07-19 PROCEDURE — 99207 ZZC PRENATAL VISIT: CPT | Mod: 25 | Performed by: FAMILY MEDICINE

## 2017-07-19 PROCEDURE — 90715 TDAP VACCINE 7 YRS/> IM: CPT | Performed by: FAMILY MEDICINE

## 2017-07-19 ASSESSMENT — PAIN SCALES - GENERAL: PAINLEVEL: NO PAIN (0)

## 2017-07-19 NOTE — NURSING NOTE
"Chief Complaint   Patient presents with     Prenatal Care     32w1d    Initial /70  Pulse 79  Temp 97.7  F (36.5  C) (Tympanic)  Resp 12  Wt 238 lb (108 kg)  LMP 12/06/2016 (Exact Date)  SpO2 98%  BMI 39.61 kg/m2 Estimated body mass index is 39.61 kg/(m^2) as calculated from the following:    Height as of 5/23/17: 5' 5\" (1.651 m).    Weight as of this encounter: 238 lb (108 kg).  Medication Reconciliation: complete    "

## 2017-07-19 NOTE — NURSING NOTE
Prior to injection verified patient identity using patient's name and date of birth.   Patient instructed to remain in clinic for 20 minutes afterwards, and to report any adverse reaction to me immediately.  Lis Alvarenga MA    Screening Questionnaire for Adult Immunization    Are you sick today?   No   Do you have allergies to medications, food, a vaccine component or latex?   No   Have you ever had a serious reaction after receiving a vaccination?   No   Do you have a long-term health problem with heart disease, lung disease, asthma, kidney disease, metabolic disease (e.g. diabetes), anemia, or other blood disorder?   No   Do you have cancer, leukemia, HIV/AIDS, or any other immune system problem?   No   In the past 3 months, have you taken medications that affect  your immune system, such as prednisone, other steroids, or anticancer drugs; drugs for the treatment of rheumatoid arthritis, Crohn s disease, or psoriasis; or have you had radiation treatments?   No   Have you had a seizure, or a brain or other nervous system problem?   No   During the past year, have you received a transfusion of blood or blood     products, or been given immune (gamma) globulin or antiviral drug?   No   For women: Are you pregnant or is there a chance you could become        pregnant during the next month?  yes   Have you received any vaccinations in the past 4 weeks?   No     Immunization questionnaire was positive for at least one answer.  Notified ok to give.      MNVFC doesn't apply on this patient    Per orders of Dr. Mcdonald, injection of TDAP  given by Lis Alvarenga. Patient instructed to remain in clinic for 15 minutes afterwards, and to report any adverse reaction to me immediately.       Screening performed by Lis Alvarenga on 7/19/2017 at 9:10 AM.

## 2017-07-19 NOTE — PROGRESS NOTES
Taking PNVs, having some pelvic pressure but no contractions.  TDaP given today.  Swelling last week when she was working 8 straight days but better this week.     Electronically signed by:  Zechariah Mcdonald M.D.  7/19/2017

## 2017-07-19 NOTE — MR AVS SNAPSHOT
After Visit Summary   7/19/2017    Shari Arshad    MRN: [id number]           Patient Information     Date Of Birth          1985        Visit Information        Provider Department      7/19/2017 8:40 AM Zechariah Mcdonald MD [facility]        Today's Diagnoses     Encounter for supervision of normal first pregnancy in third trimester    -  1    Encounter for immunization           Follow-ups after your visit        Your next 10 appointments already scheduled     Aug 01, 2017  3:20 PM CDT   ESTABLISHED PRENATAL with Zechariah Mcdonald MD   [facility] ([address] [address]   [phone]            Aug 15, 2017  2:00 PM CDT   ESTABLISHED PRENATAL with Zechariah Mcdonald MD   [facility] ([address] [address]   [phone]            Aug 29, 2017  1:40 PM CDT   ESTABLISHED PRENATAL with Zechariah Mcdonald MD   [facility] ([facility])    [address] [place] [address]   [phone]            Sep 07, 2017 10:50 AM CDT   ESTABLISHED PRENATAL with Zechariah Mcdonald MD   [facility] ([facility])    [address] [place] [address]   [phone]            Sep 12, 2017 11:10 AM CDT   ESTABLISHED PRENATAL with Zechariah Mcdonald MD   [facility] ([address] [address]   [phone]              Who to contact     If you have questions or need follow up information about today's clinic visit or your schedule please contact [facility] directly at [phone].  Normal or non-critical lab and imaging results will be communicated to you by MyChart, letter or phone within 4 business days after the clinic has received the results. If you do not hear from us within 7 days,  please contact the clinic through [employer] or phone. If you have a critical or abnormal lab result, we will notify you by phone as soon as possible.  Submit refill requests through [employer] or call your pharmacy and they will forward the refill request to us. Please allow 3 business days for your refill to be completed.          Additional Information About Your Visit        [employer] Information     [employer] gives you secure access to your electronic health record. If you see a primary care provider, you can also send messages to your care team and make appointments. If you have questions, please call your primary care clinic.  If you do not have a primary care provider, please call [phone] and they will assist you.        Care EveryWhere ID     This is your Care EveryWhere ID. This could be used by other organizations to access your [place] medical records  [id number]        Your Vitals Were     Pulse Temperature Respirations Last Period Pulse Oximetry BMI (Body Mass Index)    79 97.7  F (36.5  C) (Tympanic) 12 12/06/2016 (Exact Date) 98% 39.61 kg/m2       Blood Pressure from Last 3 Encounters:   07/19/17 124/70   06/20/17 122/74   05/23/17 116/70    Weight from Last 3 Encounters:   07/19/17 238 lb (108 kg)   06/20/17 229 lb (103.9 kg)   05/23/17 228 lb (103.4 kg)              We Performed the Following     TDAP VACCINE (ADACEL)        Primary Care Provider Office Phone # Fax #    Zechariah JUDY Mcdonald [phone]       [facility] [place] [address] [place] [address]        Equal Access to Services     [facility]: Hadii aad ku hadasho Soomaali, waaxda luqadaha, qaybta kaalmada adeegyada, ashley gore . So [facility] [phone].    ATENCIÓN: Si habla español, tiene a meyers disposición servicios gratuitos de asistencia lingüística. Llame al [phone].    We comply with applicable federal civil rights laws and [place] laws. We do not discriminate on  the basis of race, color, national origin, age, disability sex, sexual orientation or gender identity.            Thank you!     Thank you for choosing [facility]  for your care. Our goal is always to provide you with excellent care. Hearing back from our patients is one way we can continue to improve our services. Please take a few minutes to complete the written survey that you may receive in the mail after your visit with us. Thank you!             Your Updated Medication List - Protect others around you: Learn how to safely use, store and throw away your medicines at www.disposemymeds.org.          This list is accurate as of: 7/19/17 10:58 AM.  Always use your most recent med list.                   Brand Name Dispense Instructions for use Diagnosis    omeprazole 20 MG CR capsule    priLOSEC    90 capsule    Take 1 capsule (20 mg) by mouth daily    Heartburn during pregnancy in third trimester       prenatal multivitamin  plus iron 27-0.8 MG Tabs per tablet     100 tablet    Take 1 tablet by mouth daily    Encounter for supervision of normal first pregnancy in first trimester       ranitidine 150 MG tablet    ZANTAC    60 tablet    Take 1 tablet (150 mg) by mouth 2 times daily    Gastroesophageal reflux disease without esophagitis

## 2017-08-01 ENCOUNTER — PRENATAL OFFICE VISIT (OUTPATIENT)
Dept: FAMILY MEDICINE | Facility: CLINIC | Age: 32
End: 2017-08-01
Payer: COMMERCIAL

## 2017-08-01 VITALS
RESPIRATION RATE: 12 BRPM | DIASTOLIC BLOOD PRESSURE: 82 MMHG | OXYGEN SATURATION: 100 % | SYSTOLIC BLOOD PRESSURE: 116 MMHG | TEMPERATURE: 97.1 F | WEIGHT: 245 LBS | BODY MASS INDEX: 40.77 KG/M2 | HEART RATE: 100 BPM

## 2017-08-01 DIAGNOSIS — Z34.03 ENCOUNTER FOR SUPERVISION OF NORMAL FIRST PREGNANCY IN THIRD TRIMESTER: Primary | ICD-10-CM

## 2017-08-01 PROCEDURE — 99207 ZZC PRENATAL VISIT: CPT | Performed by: FAMILY MEDICINE

## 2017-08-01 ASSESSMENT — PAIN SCALES - GENERAL: PAINLEVEL: NO PAIN (0)

## 2017-08-01 NOTE — PROGRESS NOTES
"Taking PNVs, no problems. Did have some mild LE edema over the weekend when patient was at a sorority reunion and \"eating lots of salt\". Not experiencing any contractions or vaginal discharge. Appointments scheduled for remainder of pregnancy.     Electronically signed by:  Zechariah Mcdonald M.D.  8/1/2017  "

## 2017-08-01 NOTE — MR AVS SNAPSHOT
After Visit Summary   8/1/2017    Shari Arshad    MRN: [id number]           Patient Information     Date Of Birth          1985        Visit Information        Provider Department      8/1/2017 3:20 PM Zechariah Mcdonald MD [facility]         Follow-ups after your visit        Your next 10 appointments already scheduled     Aug 15, 2017  2:00 PM CDT   ESTABLISHED PRENATAL with Zechariah Mcdonald MD   [facility] ([address] [address]   [phone]            Aug 29, 2017  1:40 PM CDT   ESTABLISHED PRENATAL with Zechariah Mcdonald MD   [facility]    [address] [place] [address]   [phone]            Sep 07, 2017 10:50 AM CDT   ESTABLISHED PRENATAL with Zechariah Mcdonald MD   [facility]    [address] [place] [address]   [phone]            Sep 12, 2017 11:10 AM CDT   ESTABLISHED PRENATAL with Zechariah Mcdonald MD   [facility]    [address] [place] [address]   [phone]              Who to contact     If you have questions or need follow up information about today's clinic visit or your schedule please contact [facility] directly at [phone].  Normal or non-critical lab and imaging results will be communicated to you by MyChart, letter or phone within 4 business days after the clinic has received the results. If you do not hear from us within 7 days, please contact the clinic through MyChart or phone. If you have a critical or abnormal lab result, we will notify you by phone as soon as possible.  Submit refill requests through [employer] or call your pharmacy and they will forward the refill request to us. Please allow 3 business days for your refill to be completed.          Additional Information  About Your Visit        [employer] Information     [employer] gives you secure access to your electronic health record. If you see a primary care provider, you can also send messages to your care team and make appointments. If you have questions, please call your primary care clinic.  If you do not have a primary care provider, please call [phone] and they will assist you.        Care EveryWhere ID     This is your Care EveryWhere ID. This could be used by other organizations to access your [place] medical records  [id number]        Your Vitals Were     Pulse Temperature Respirations Last Period Pulse Oximetry BMI (Body Mass Index)    100 97.1  F (36.2  C) (Tympanic) 12 12/06/2016 (Exact Date) 100% 40.77 kg/m2       Blood Pressure from Last 3 Encounters:   08/01/17 116/82   07/19/17 124/70   06/20/17 122/74    Weight from Last 3 Encounters:   08/01/17 245 lb (111.1 kg)   07/19/17 238 lb (108 kg)   06/20/17 229 lb (103.9 kg)              Today, you had the following     No orders found for display       Primary Care Provider Office Phone # Fax #    Zechariah Mcdonald [phone]       [facility] [address] [place] [address]        Equal Access to Services     [facility]ORA AGRUETA : Hadii clara ku hadasho Soomaali, waaxda luqadaha, qaybta kaalmada adeegyada, waxay idiin haydainan reece penn. So [facility] [phone].    ATENCIÓN: Si habla español, tiene a meyers disposición servicios gratuitos de asistencia lingüística. Llame al [phone].    We comply with applicable federal civil rights laws and [place] laws. We do not discriminate on the basis of race, color, national origin, age, disability sex, sexual orientation or gender identity.            Thank you!     Thank you for choosing [facility]  for your care. Our goal is always to provide you with excellent care. Hearing back from our patients is one way we can continue to improve our services. Please take  a few minutes to complete the written survey that you may receive in the mail after your visit with us. Thank you!             Your Updated Medication List - Protect others around you: Learn how to safely use, store and throw away your medicines at www.disposemymeds.org.          This list is accurate as of: 8/1/17  3:56 PM.  Always use your most recent med list.                   Brand Name Dispense Instructions for use Diagnosis    omeprazole 20 MG CR capsule    priLOSEC    90 capsule    Take 1 capsule (20 mg) by mouth daily    Heartburn during pregnancy in third trimester       prenatal multivitamin  plus iron 27-0.8 MG Tabs per tablet     100 tablet    Take 1 tablet by mouth daily    Encounter for supervision of normal first pregnancy in first trimester

## 2017-08-01 NOTE — NURSING NOTE
"Chief Complaint   Patient presents with     Prenatal Care   34w0d      Initial /82  Pulse 100  Temp 97.1  F (36.2  C) (Tympanic)  Resp 12  Wt 245 lb (111.1 kg)  LMP 12/06/2016 (Exact Date)  SpO2 100%  BMI 40.77 kg/m2 Estimated body mass index is 40.77 kg/(m^2) as calculated from the following:    Height as of 5/23/17: 5' 5\" (1.651 m).    Weight as of this encounter: 245 lb (111.1 kg).  Medication Reconciliation: complete    "

## 2017-08-15 ENCOUNTER — PRENATAL OFFICE VISIT (OUTPATIENT)
Dept: FAMILY MEDICINE | Facility: CLINIC | Age: 32
End: 2017-08-15
Payer: COMMERCIAL

## 2017-08-15 VITALS
BODY MASS INDEX: 41.44 KG/M2 | OXYGEN SATURATION: 98 % | DIASTOLIC BLOOD PRESSURE: 80 MMHG | TEMPERATURE: 97.3 F | RESPIRATION RATE: 12 BRPM | HEART RATE: 97 BPM | SYSTOLIC BLOOD PRESSURE: 130 MMHG | WEIGHT: 249 LBS

## 2017-08-15 DIAGNOSIS — Z34.03 ENCOUNTER FOR SUPERVISION OF NORMAL FIRST PREGNANCY IN THIRD TRIMESTER: Primary | ICD-10-CM

## 2017-08-15 LAB — HGB BLD-MCNC: 14.4 G/DL (ref 11.7–15.7)

## 2017-08-15 PROCEDURE — 87653 STREP B DNA AMP PROBE: CPT | Performed by: FAMILY MEDICINE

## 2017-08-15 PROCEDURE — 36415 COLL VENOUS BLD VENIPUNCTURE: CPT | Performed by: FAMILY MEDICINE

## 2017-08-15 PROCEDURE — 87186 SC STD MICRODIL/AGAR DIL: CPT | Performed by: FAMILY MEDICINE

## 2017-08-15 PROCEDURE — 86780 TREPONEMA PALLIDUM: CPT | Performed by: FAMILY MEDICINE

## 2017-08-15 PROCEDURE — 00000218 ZZHCL STATISTIC OBHBG - HEMOGLOBIN: Performed by: FAMILY MEDICINE

## 2017-08-15 PROCEDURE — 99207 ZZC PRENATAL VISIT: CPT | Performed by: FAMILY MEDICINE

## 2017-08-15 ASSESSMENT — PAIN SCALES - GENERAL: PAINLEVEL: MILD PAIN (3)

## 2017-08-15 NOTE — PROGRESS NOTES
Taking PNVs, no problems. GBS treponema antibody and hgb obtained today.   Signs and symptoms of labor were reviewed.      Electronically signed by:  Zechariah Mcdonald M.D.  8/15/2017

## 2017-08-15 NOTE — PROGRESS NOTES
Taking PNVs. Continues to have mild LE edema after long work shifts that resolves with rest/elevation. Denies headaches or visual changes. Her cervix is very soft today, but no dilation. She has not had any fluid leak. Discussed signs of labor including fluid gush and contractions that are less than 10 minutes apart. Instructed to return to clinic if any question of her membranes rupturing. Will complete GBS and final glucose and antitreponema today.

## 2017-08-15 NOTE — MR AVS SNAPSHOT
After Visit Summary   8/15/2017    Shari Arshad    MRN: [id number]           Patient Information     Date Of Birth          1985        Visit Information        Provider Department      8/15/2017 2:00 PM Zechariah Mcdonald MD [facility]         Follow-ups after your visit        Your next 10 appointments already scheduled     Aug 23, 2017  3:40 PM CDT   ESTABLISHED PRENATAL with Zechariah Mcdonald MD   [facility] ([address] [address]   [phone]            Aug 29, 2017  1:40 PM CDT   ESTABLISHED PRENATAL with Zechariah Mcdonald MD   [facility] ([facility])    [address] [place] [address]   [phone]            Sep 07, 2017 10:50 AM CDT   ESTABLISHED PRENATAL with Zechariah Mcdonald MD   [facility] ([facility])    [address] [place] [address]   [phone]            Sep 12, 2017 11:10 AM CDT   ESTABLISHED PRENATAL with Zechariah Mcdonald MD   [facility] ([facility])    [address] [place] [address]   [phone]              Who to contact     If you have questions or need follow up information about today's clinic visit or your schedule please contact [facility] directly at [phone].  Normal or non-critical lab and imaging results will be communicated to you by MyChart, letter or phone within 4 business days after the clinic has received the results. If you do not hear from us within 7 days, please contact the clinic through MyChart or phone. If you have a critical or abnormal lab result, we will notify you by phone as soon as possible.  Submit refill requests through [employer] or call your pharmacy and they will forward the refill request to us. Please allow 3 business days for your refill to be completed.          Additional Information  About Your Visit        SocialMedia.comhart Information     [employer] gives you secure access to your electronic health record. If you see a primary care provider, you can also send messages to your care team and make appointments. If you have questions, please call your primary care clinic.  If you do not have a primary care provider, please call [phone] and they will assist you.        Care EveryWhere ID     This is your Care EveryWhere ID. This could be used by other organizations to access your [facility] medical records  [id number]        Your Vitals Were     Pulse Temperature Respirations Last Period Pulse Oximetry BMI (Body Mass Index)    97 97.3  F (36.3  C) (Tympanic) 12 12/06/2016 (Exact Date) 98% 41.44 kg/m2       Blood Pressure from Last 3 Encounters:   08/15/17 130/80   08/01/17 116/82   07/19/17 124/70    Weight from Last 3 Encounters:   08/15/17 249 lb (112.9 kg)   08/01/17 245 lb (111.1 kg)   07/19/17 238 lb (108 kg)              Today, you had the following     No orders found for display       Primary Care Provider Office Phone # Fax #    Zechariah Mcdonald [phone]       [address] [place] [address]        Equal Access to Services     DORA ARGUETA : Hadii clara ku hadasho Soomaali, waaxda luqadaha, qaybta kaalmada adeegyada, waxay idiin haydainan reece pnen. So [facility] [phone].    ATENCIÓN: Si habla español, tiene a meyers disposición servicios gratuitos de asistencia lingüística. Llame al [phone].    We comply with applicable federal civil rights laws and [place] laws. We do not discriminate on the basis of race, color, national origin, age, disability sex, sexual orientation or gender identity.            Thank you!     Thank you for choosing [facility]  for your care. Our goal is always to provide you with excellent care. Hearing back from our patients is one way we can continue to improve our services. Please take a few minutes to complete  the written survey that you may receive in the mail after your visit with us. Thank you!             Your Updated Medication List - Protect others around you: Learn how to safely use, store and throw away your medicines at www.disposemymeds.org.          This list is accurate as of: 8/15/17  2:12 PM.  Always use your most recent med list.                   Brand Name Dispense Instructions for use Diagnosis    omeprazole 20 MG CR capsule    priLOSEC    90 capsule    Take 1 capsule (20 mg) by mouth daily    Heartburn during pregnancy in third trimester       prenatal multivitamin plus iron 27-0.8 MG Tabs per tablet     100 tablet    Take 1 tablet by mouth daily    Encounter for supervision of normal first pregnancy in first trimester

## 2017-08-15 NOTE — NURSING NOTE
"Chief Complaint   Patient presents with     Prenatal Care   36w0d      Initial /80  Pulse 97  Temp 97.3  F (36.3  C) (Tympanic)  Resp 12  Wt 249 lb (112.9 kg)  LMP 12/06/2016 (Exact Date)  SpO2 98%  BMI 41.44 kg/m2 Estimated body mass index is 41.44 kg/(m^2) as calculated from the following:    Height as of 5/23/17: 5' 5\" (1.651 m).    Weight as of this encounter: 249 lb (112.9 kg).  Medication Reconciliation: complete    "

## 2017-08-16 LAB
GP B STREP DNA SPEC QL NAA+PROBE: POSITIVE
SPECIMEN SOURCE: ABNORMAL
T PALLIDUM IGG+IGM SER QL: NEGATIVE

## 2017-08-19 LAB
BACTERIA SPEC CULT: ABNORMAL
SPECIMEN SOURCE: ABNORMAL

## 2017-08-23 ENCOUNTER — PRENATAL OFFICE VISIT (OUTPATIENT)
Dept: FAMILY MEDICINE | Facility: CLINIC | Age: 32
End: 2017-08-23
Payer: COMMERCIAL

## 2017-08-23 VITALS
DIASTOLIC BLOOD PRESSURE: 72 MMHG | OXYGEN SATURATION: 99 % | RESPIRATION RATE: 12 BRPM | TEMPERATURE: 96.7 F | HEART RATE: 110 BPM | WEIGHT: 247 LBS | BODY MASS INDEX: 41.1 KG/M2 | SYSTOLIC BLOOD PRESSURE: 124 MMHG

## 2017-08-23 DIAGNOSIS — Z34.03 ENCOUNTER FOR SUPERVISION OF NORMAL FIRST PREGNANCY IN THIRD TRIMESTER: Primary | ICD-10-CM

## 2017-08-23 DIAGNOSIS — M54.42 ACUTE LEFT-SIDED LOW BACK PAIN WITH LEFT-SIDED SCIATICA: ICD-10-CM

## 2017-08-23 PROCEDURE — 99207 ZZC PRENATAL VISIT: CPT | Performed by: FAMILY MEDICINE

## 2017-08-23 ASSESSMENT — PAIN SCALES - GENERAL: PAINLEVEL: MODERATE PAIN (4)

## 2017-08-23 NOTE — NURSING NOTE
"Chief Complaint   Patient presents with     Prenatal Care     37w1d    Initial /72  Pulse 110  Temp 96.7  F (35.9  C) (Tympanic)  Resp 12  Wt 247 lb (112 kg)  LMP 12/06/2016 (Exact Date)  SpO2 99%  BMI 41.1 kg/m2 Estimated body mass index is 41.1 kg/(m^2) as calculated from the following:    Height as of 5/23/17: 5' 5\" (1.651 m).    Weight as of this encounter: 247 lb (112 kg).  Medication Reconciliation: complete    "

## 2017-08-23 NOTE — PROGRESS NOTES
Taking PNVs, having left sided sciatica for the last week.  Better with lying on her right side, and worse when she is up walking or trying to work.  We discussed PT and trying to get her some relief with that.  She would like to try it.     Signs and symptoms of labor were reviewed.      Electronically signed by:  Zechariah Mcdonald M.D.  8/23/2017

## 2017-08-23 NOTE — MR AVS SNAPSHOT
"              After Visit Summary   8/23/2017    Shari Arshad    MRN: [id number]           Patient Information     Date Of Birth          1985        Visit Information        Provider Department      8/23/2017 3:40 PM Zechariah Mcdonald MD [facility]        Today's Diagnoses     Encounter for supervision of normal first pregnancy in third trimester    -  1    Acute left-sided low back pain with left-sided sciatica           Follow-ups after your visit        Additional Services     PHYSICAL THERAPY REFERRAL       *This therapy referral will be filtered to a centralized scheduling office at [facility] and the patient will receive a call to schedule an appointment at a [facility] location most convenient for them. *  Would like to have this done in [place], she lives in [place].    [facility] provides Physical Therapy evaluation and treatment and many specialty services across the [facility] system.  If requesting a specialty program, please choose from the list below.    If you have not heard from the scheduling office within 2 business days, please call [phone] for all locations, with the exception of [place], please call [phone].  Treatment: Evaluation & Treatment  Special Instructions/Modalities: 37 wks pregnant and now having left sided sciatica.  It is relieved with laying on her left side, but worse when yp trying to walk or work.    Special Programs: per the therapists recommendations.      Please be aware that coverage of these services is subject to the terms and limitations of your health insurance plan.  Call member services at your health plan with any benefit or coverage questions.      **Note to Provider:  If you are referring outside of [facility] for the therapy appointment, please list the name of the location in the \"special instructions\" above, print the referral and give to the patient to schedule the appointment.             "      Your next 10 appointments already scheduled     Aug 29, 2017  1:40 PM CDT   ESTABLISHED PRENATAL with Zechariah Mcdonald MD   [facility]    [address] [place] [id number] [phone]            Sep 07, 2017 10:50 AM CDT   ESTABLISHED PRENATAL with Zechariah Mcdonald MD   [facility] ([facility])    [address] [place] [address]   [phone]            Sep 12, 2017 11:10 AM CDT   ESTABLISHED PRENATAL with Zechariah Mcdonald MD   [facility] ([facility])    [address] [place] [id number] [phone]              Who to contact     If you have questions or need follow up information about today's clinic visit or your schedule please contact [facility] directly at [phone].  Normal or non-critical lab and imaging results will be communicated to you by [employer] Datahart, letter or phone within 4 business days after the clinic has received the results. If you do not hear from us within 7 days, please contact the clinic through J[employer] Enter[employer] or phone. If you have a critical or abnormal lab result, we will notify you by phone as soon as possible.  Submit refill requests through [employer] or call your pharmacy and they will forward the refill request to us. Please allow 3 business days for your refill to be completed.          Additional Information About Your Visit        Nimbus Datahart Information     [employer] gives you secure access to your electronic health record. If you see a primary care provider, you can also send messages to your care team and make appointments. If you have questions, please call your primary care clinic.  If you do not have a primary care provider, please call [phone] and they will assist you.        Care EveryWhere ID     This is your Care EveryWhere ID. This could be used by other organizations to access your [facility]  records  [id number]        Your Vitals Were     Pulse Temperature Respirations Last Period Pulse Oximetry BMI (Body Mass Index)    110 96.7  F (35.9  C) (Tympanic) 12 12/06/2016 (Exact Date) 99% 41.1 kg/m2       Blood Pressure from Last 3 Encounters:   08/23/17 124/72   08/15/17 130/80   08/01/17 116/82    Weight from Last 3 Encounters:   08/23/17 247 lb (112 kg)   08/15/17 249 lb (112.9 kg)   08/01/17 245 lb (111.1 kg)              We Performed the Following     PHYSICAL THERAPY REFERRAL        Primary Care Provider Office Phone # Fax #    Zechariah Mcdonald [phone]       [address] [place] [address]        Equal Access to Services     [facility]: Radha carbone Soapollo, waaxda luqadaha, qaybta kaalmada savita, ashley gore . So [facility] [phone].    ATENCIÓN: Si habla español, tiene a meyers disposición servicios gratuitos de asistencia lingüística. Llame al [phone].    We comply with applicable federal civil rights laws and [place] laws. We do not discriminate on the basis of race, color, national origin, age, disability sex, sexual orientation or gender identity.            Thank you!     Thank you for choosing [facility]  for your care. Our goal is always to provide you with excellent care. Hearing back from our patients is one way we can continue to improve our services. Please take a few minutes to complete the written survey that you may receive in the mail after your visit with us. Thank you!             Your Updated Medication List - Protect others around you: Learn how to safely use, store and throw away your medicines at www.disposemymeds.org.          This list is accurate as of: 8/23/17  5:17 PM.  Always use your most recent med list.                   Brand Name Dispense Instructions for use Diagnosis    omeprazole 20 MG CR capsule    priLOSEC    90 capsule    Take 1 capsule (20 mg) by mouth daily     Heartburn during pregnancy in third trimester       prenatal multivitamin plus iron 27-0.8 MG Tabs per tablet     100 tablet    Take 1 tablet by mouth daily    Encounter for supervision of normal first pregnancy in first trimester

## 2017-08-29 ENCOUNTER — ANESTHESIA EVENT (OUTPATIENT)
Dept: SURGERY | Facility: CLINIC | Age: 32
End: 2017-08-29
Payer: COMMERCIAL

## 2017-08-29 ENCOUNTER — PRENATAL OFFICE VISIT (OUTPATIENT)
Dept: FAMILY MEDICINE | Facility: CLINIC | Age: 32
End: 2017-08-29
Payer: COMMERCIAL

## 2017-08-29 ENCOUNTER — SURGERY (OUTPATIENT)
Age: 32
End: 2017-08-29

## 2017-08-29 ENCOUNTER — HOSPITAL ENCOUNTER (INPATIENT)
Facility: CLINIC | Age: 32
LOS: 4 days | Discharge: HOME | End: 2017-09-02
Attending: FAMILY MEDICINE | Admitting: FAMILY MEDICINE
Payer: COMMERCIAL

## 2017-08-29 ENCOUNTER — APPOINTMENT (OUTPATIENT)
Dept: ULTRASOUND IMAGING | Facility: CLINIC | Age: 32
End: 2017-08-29
Attending: FAMILY MEDICINE
Payer: COMMERCIAL

## 2017-08-29 ENCOUNTER — ANESTHESIA (OUTPATIENT)
Dept: SURGERY | Facility: CLINIC | Age: 32
End: 2017-08-29
Payer: COMMERCIAL

## 2017-08-29 VITALS
RESPIRATION RATE: 16 BRPM | DIASTOLIC BLOOD PRESSURE: 122 MMHG | HEART RATE: 90 BPM | SYSTOLIC BLOOD PRESSURE: 166 MMHG | WEIGHT: 248 LBS | BODY MASS INDEX: 41.27 KG/M2 | OXYGEN SATURATION: 98 % | TEMPERATURE: 96.5 F

## 2017-08-29 DIAGNOSIS — Z98.891 S/P CESAREAN SECTION: ICD-10-CM

## 2017-08-29 DIAGNOSIS — Z34.03 ENCOUNTER FOR SUPERVISION OF NORMAL FIRST PREGNANCY IN THIRD TRIMESTER: Primary | ICD-10-CM

## 2017-08-29 DIAGNOSIS — K59.03 CONSTIPATION DUE TO PAIN MEDICATION: ICD-10-CM

## 2017-08-29 DIAGNOSIS — O16.1 ELEVATED BLOOD PRESSURE AFFECTING PREGNANCY IN FIRST TRIMESTER, ANTEPARTUM: Primary | ICD-10-CM

## 2017-08-29 DIAGNOSIS — O13.3: ICD-10-CM

## 2017-08-29 PROBLEM — O13.9 PIH (PREGNANCY INDUCED HYPERTENSION): Status: ACTIVE | Noted: 2017-08-29

## 2017-08-29 LAB
ABO + RH BLD: ABNORMAL
ABO + RH BLD: ABNORMAL
ALBUMIN UR-MCNC: NEGATIVE MG/DL
ALT SERPL W P-5'-P-CCNC: 22 U/L (ref 0–50)
APPEARANCE UR: CLEAR
AST SERPL W P-5'-P-CCNC: 16 U/L (ref 0–45)
BACTERIA #/AREA URNS HPF: ABNORMAL /HPF
BILIRUB UR QL STRIP: NEGATIVE
BLD GP AB INVEST PLASRBC-IMP: ABNORMAL
BLD GP AB SCN SERPL QL: ABNORMAL
BLOOD BANK CMNT PATIENT-IMP: ABNORMAL
BLOOD BANK CMNT PATIENT-IMP: ABNORMAL
BLOOD BANK CMNT PATIENT-IMP: NORMAL
BLOOD BANK CMNT PATIENT-IMP: NORMAL
BUN SERPL-MCNC: 12 MG/DL (ref 7–30)
COLOR UR AUTO: YELLOW
CREAT SERPL-MCNC: 0.84 MG/DL (ref 0.52–1.04)
ERYTHROCYTE [DISTWIDTH] IN BLOOD BY AUTOMATED COUNT: 13.3 % (ref 10–15)
GFR SERPL CREATININE-BSD FRML MDRD: 78 ML/MIN/1.7M2
GLUCOSE UR STRIP-MCNC: NEGATIVE MG/DL
HCT VFR BLD AUTO: 41.7 % (ref 35–47)
HGB BLD-MCNC: 13.8 G/DL (ref 11.7–15.7)
HGB UR QL STRIP: ABNORMAL
KETONES UR STRIP-MCNC: NEGATIVE MG/DL
LEUKOCYTE ESTERASE UR QL STRIP: ABNORMAL
MCH RBC QN AUTO: 31.6 PG (ref 26.5–33)
MCHC RBC AUTO-ENTMCNC: 33.1 G/DL (ref 31.5–36.5)
MCV RBC AUTO: 95 FL (ref 78–100)
NITRATE UR QL: NEGATIVE
PH UR STRIP: 6.5 PH (ref 5–7)
PLATELET # BLD AUTO: 252 10E9/L (ref 150–450)
RBC # BLD AUTO: 4.37 10E12/L (ref 3.8–5.2)
RBC #/AREA URNS AUTO: <1 /HPF (ref 0–2)
SOURCE: ABNORMAL
SP GR UR STRIP: 1.01 (ref 1–1.03)
SPECIMEN EXP DATE BLD: ABNORMAL
SQUAMOUS #/AREA URNS AUTO: 2 /HPF (ref 0–1)
URATE SERPL-MCNC: 4.3 MG/DL (ref 2.6–6)
UROBILINOGEN UR STRIP-MCNC: 0.2 MG/DL (ref 0–2)
WBC # BLD AUTO: 13.8 10E9/L (ref 4–11)
WBC #/AREA URNS AUTO: 2 /HPF (ref 0–2)

## 2017-08-29 PROCEDURE — 88307 TISSUE EXAM BY PATHOLOGIST: CPT | Performed by: FAMILY MEDICINE

## 2017-08-29 PROCEDURE — 86850 RBC ANTIBODY SCREEN: CPT | Performed by: FAMILY MEDICINE

## 2017-08-29 PROCEDURE — 25000128 H RX IP 250 OP 636: Performed by: FAMILY MEDICINE

## 2017-08-29 PROCEDURE — 86900 BLOOD TYPING SEROLOGIC ABO: CPT | Performed by: FAMILY MEDICINE

## 2017-08-29 PROCEDURE — 99215 OFFICE O/P EST HI 40 MIN: CPT | Mod: 25

## 2017-08-29 PROCEDURE — 36415 COLL VENOUS BLD VENIPUNCTURE: CPT | Performed by: FAMILY MEDICINE

## 2017-08-29 PROCEDURE — 71000014 ZZH RECOVERY PHASE 1 LEVEL 2 FIRST HR: Performed by: FAMILY MEDICINE

## 2017-08-29 PROCEDURE — 84550 ASSAY OF BLOOD/URIC ACID: CPT | Performed by: FAMILY MEDICINE

## 2017-08-29 PROCEDURE — 76819 FETAL BIOPHYS PROFIL W/O NST: CPT

## 2017-08-29 PROCEDURE — 27110038 ZZH RX 271: Performed by: FAMILY MEDICINE

## 2017-08-29 PROCEDURE — 27210794 ZZH OR GENERAL SUPPLY STERILE: Performed by: FAMILY MEDICINE

## 2017-08-29 PROCEDURE — 37000009 ZZH ANESTHESIA TECHNICAL FEE, EACH ADDTL 15 MIN: Performed by: FAMILY MEDICINE

## 2017-08-29 PROCEDURE — 88307 TISSUE EXAM BY PATHOLOGIST: CPT | Mod: 26 | Performed by: FAMILY MEDICINE

## 2017-08-29 PROCEDURE — 36000056 ZZH SURGERY LEVEL 3 1ST 30 MIN: Performed by: FAMILY MEDICINE

## 2017-08-29 PROCEDURE — 84450 TRANSFERASE (AST) (SGOT): CPT | Performed by: FAMILY MEDICINE

## 2017-08-29 PROCEDURE — 99214 OFFICE O/P EST MOD 30 MIN: CPT | Mod: 25

## 2017-08-29 PROCEDURE — 59025 FETAL NON-STRESS TEST: CPT

## 2017-08-29 PROCEDURE — 37000008 ZZH ANESTHESIA TECHNICAL FEE, 1ST 30 MIN: Performed by: FAMILY MEDICINE

## 2017-08-29 PROCEDURE — 86870 RBC ANTIBODY IDENTIFICATION: CPT | Performed by: FAMILY MEDICINE

## 2017-08-29 PROCEDURE — 71000015 ZZH RECOVERY PHASE 1 LEVEL 2 EA ADDTL HR: Performed by: FAMILY MEDICINE

## 2017-08-29 PROCEDURE — 36000058 ZZH SURGERY LEVEL 3 EA 15 ADDTL MIN: Performed by: FAMILY MEDICINE

## 2017-08-29 PROCEDURE — 84460 ALANINE AMINO (ALT) (SGPT): CPT | Performed by: FAMILY MEDICINE

## 2017-08-29 PROCEDURE — 82565 ASSAY OF CREATININE: CPT | Performed by: FAMILY MEDICINE

## 2017-08-29 PROCEDURE — 99207 ZZC COMPLICATED OB VISIT: CPT | Performed by: FAMILY MEDICINE

## 2017-08-29 PROCEDURE — 85027 COMPLETE CBC AUTOMATED: CPT | Performed by: FAMILY MEDICINE

## 2017-08-29 PROCEDURE — 59510 CESAREAN DELIVERY: CPT | Performed by: FAMILY MEDICINE

## 2017-08-29 PROCEDURE — 59514 CESAREAN DELIVERY ONLY: CPT | Mod: 80 | Performed by: FAMILY MEDICINE

## 2017-08-29 PROCEDURE — 86901 BLOOD TYPING SEROLOGIC RH(D): CPT | Performed by: FAMILY MEDICINE

## 2017-08-29 PROCEDURE — 12000029 ZZH R&B OB INTERMEDIATE

## 2017-08-29 PROCEDURE — 25000125 ZZHC RX 250: Performed by: NURSE ANESTHETIST, CERTIFIED REGISTERED

## 2017-08-29 PROCEDURE — 81001 URINALYSIS AUTO W/SCOPE: CPT | Performed by: FAMILY MEDICINE

## 2017-08-29 PROCEDURE — 25000125 ZZHC RX 250: Performed by: FAMILY MEDICINE

## 2017-08-29 PROCEDURE — 84520 ASSAY OF UREA NITROGEN: CPT | Performed by: FAMILY MEDICINE

## 2017-08-29 PROCEDURE — 25000128 H RX IP 250 OP 636: Performed by: NURSE ANESTHETIST, CERTIFIED REGISTERED

## 2017-08-29 RX ORDER — ONDANSETRON 2 MG/ML
4 INJECTION INTRAMUSCULAR; INTRAVENOUS EVERY 6 HOURS PRN
Status: DISCONTINUED | OUTPATIENT
Start: 2017-08-29 | End: 2017-08-29

## 2017-08-29 RX ORDER — DEXTROSE, SODIUM CHLORIDE, SODIUM LACTATE, POTASSIUM CHLORIDE, AND CALCIUM CHLORIDE 5; .6; .31; .03; .02 G/100ML; G/100ML; G/100ML; G/100ML; G/100ML
INJECTION, SOLUTION INTRAVENOUS CONTINUOUS
Status: DISCONTINUED | OUTPATIENT
Start: 2017-08-29 | End: 2017-08-29

## 2017-08-29 RX ORDER — FENTANYL CITRATE 50 UG/ML
25-50 INJECTION, SOLUTION INTRAMUSCULAR; INTRAVENOUS
Status: DISCONTINUED | OUTPATIENT
Start: 2017-08-29 | End: 2017-08-29 | Stop reason: HOSPADM

## 2017-08-29 RX ORDER — OXYTOCIN 10 [USP'U]/ML
10 INJECTION, SOLUTION INTRAMUSCULAR; INTRAVENOUS
Status: DISCONTINUED | OUTPATIENT
Start: 2017-08-29 | End: 2017-08-29

## 2017-08-29 RX ORDER — FENTANYL CITRATE 50 UG/ML
INJECTION, SOLUTION INTRAMUSCULAR; INTRAVENOUS PRN
Status: DISCONTINUED | OUTPATIENT
Start: 2017-08-29 | End: 2017-08-29

## 2017-08-29 RX ORDER — BISACODYL 10 MG
10 SUPPOSITORY, RECTAL RECTAL DAILY PRN
Status: DISCONTINUED | OUTPATIENT
Start: 2017-08-31 | End: 2017-08-29

## 2017-08-29 RX ORDER — SODIUM CHLORIDE, SODIUM LACTATE, POTASSIUM CHLORIDE, CALCIUM CHLORIDE 600; 310; 30; 20 MG/100ML; MG/100ML; MG/100ML; MG/100ML
INJECTION, SOLUTION INTRAVENOUS CONTINUOUS
Status: DISCONTINUED | OUTPATIENT
Start: 2017-08-29 | End: 2017-08-29 | Stop reason: HOSPADM

## 2017-08-29 RX ORDER — LANOLIN 100 %
OINTMENT (GRAM) TOPICAL
Status: DISCONTINUED | OUTPATIENT
Start: 2017-08-29 | End: 2017-08-29

## 2017-08-29 RX ORDER — SODIUM CHLORIDE, SODIUM LACTATE, POTASSIUM CHLORIDE, CALCIUM CHLORIDE 600; 310; 30; 20 MG/100ML; MG/100ML; MG/100ML; MG/100ML
INJECTION, SOLUTION INTRAVENOUS CONTINUOUS
Status: DISCONTINUED | OUTPATIENT
Start: 2017-08-29 | End: 2017-08-29

## 2017-08-29 RX ORDER — MISOPROSTOL 200 UG/1
400 TABLET ORAL
Status: DISCONTINUED | OUTPATIENT
Start: 2017-08-29 | End: 2017-08-29

## 2017-08-29 RX ORDER — SIMETHICONE 80 MG
80 TABLET,CHEWABLE ORAL 4 TIMES DAILY PRN
Status: DISCONTINUED | OUTPATIENT
Start: 2017-08-29 | End: 2017-08-29

## 2017-08-29 RX ORDER — HYDROCORTISONE 2.5 %
CREAM (GRAM) TOPICAL 3 TIMES DAILY PRN
Status: DISCONTINUED | OUTPATIENT
Start: 2017-08-29 | End: 2017-08-29

## 2017-08-29 RX ORDER — DIPHENHYDRAMINE HCL 25 MG
25 CAPSULE ORAL EVERY 6 HOURS PRN
Status: DISCONTINUED | OUTPATIENT
Start: 2017-08-29 | End: 2017-08-29

## 2017-08-29 RX ORDER — DIPHENHYDRAMINE HYDROCHLORIDE 50 MG/ML
25 INJECTION INTRAMUSCULAR; INTRAVENOUS EVERY 6 HOURS PRN
Status: DISCONTINUED | OUTPATIENT
Start: 2017-08-29 | End: 2017-08-29

## 2017-08-29 RX ORDER — KETOROLAC TROMETHAMINE 30 MG/ML
30 INJECTION, SOLUTION INTRAMUSCULAR; INTRAVENOUS EVERY 6 HOURS
Status: COMPLETED | OUTPATIENT
Start: 2017-08-29 | End: 2017-08-30

## 2017-08-29 RX ORDER — MEPERIDINE HYDROCHLORIDE 25 MG/ML
12.5 INJECTION INTRAMUSCULAR; INTRAVENOUS; SUBCUTANEOUS EVERY 5 MIN PRN
Status: DISCONTINUED | OUTPATIENT
Start: 2017-08-29 | End: 2017-08-29 | Stop reason: HOSPADM

## 2017-08-29 RX ORDER — KETOROLAC TROMETHAMINE 30 MG/ML
30 INJECTION, SOLUTION INTRAMUSCULAR; INTRAVENOUS EVERY 6 HOURS
Status: DISCONTINUED | OUTPATIENT
Start: 2017-08-29 | End: 2017-08-29

## 2017-08-29 RX ORDER — LIDOCAINE 40 MG/G
CREAM TOPICAL
Status: DISCONTINUED | OUTPATIENT
Start: 2017-08-29 | End: 2017-09-02 | Stop reason: CLARIF

## 2017-08-29 RX ORDER — ONDANSETRON 2 MG/ML
4 INJECTION INTRAMUSCULAR; INTRAVENOUS EVERY 30 MIN PRN
Status: DISCONTINUED | OUTPATIENT
Start: 2017-08-29 | End: 2017-08-29 | Stop reason: HOSPADM

## 2017-08-29 RX ORDER — AMOXICILLIN 250 MG
1-2 CAPSULE ORAL 2 TIMES DAILY
Status: DISCONTINUED | OUTPATIENT
Start: 2017-08-29 | End: 2017-08-29

## 2017-08-29 RX ORDER — OXYTOCIN/0.9 % SODIUM CHLORIDE 30/500 ML
100 PLASTIC BAG, INJECTION (ML) INTRAVENOUS CONTINUOUS
Status: DISCONTINUED | OUTPATIENT
Start: 2017-08-29 | End: 2017-08-29

## 2017-08-29 RX ORDER — LIDOCAINE 40 MG/G
CREAM TOPICAL
Status: DISCONTINUED | OUTPATIENT
Start: 2017-08-29 | End: 2017-08-29

## 2017-08-29 RX ORDER — NALBUPHINE HYDROCHLORIDE 10 MG/ML
2.5-5 INJECTION, SOLUTION INTRAMUSCULAR; INTRAVENOUS; SUBCUTANEOUS EVERY 6 HOURS PRN
Status: DISCONTINUED | OUTPATIENT
Start: 2017-08-29 | End: 2017-09-02 | Stop reason: CLARIF

## 2017-08-29 RX ORDER — OXYTOCIN 10 [USP'U]/ML
10 INJECTION, SOLUTION INTRAMUSCULAR; INTRAVENOUS
Status: COMPLETED | OUTPATIENT
Start: 2017-08-29 | End: 2017-08-29

## 2017-08-29 RX ORDER — NALOXONE HYDROCHLORIDE 0.4 MG/ML
.1-.4 INJECTION, SOLUTION INTRAMUSCULAR; INTRAVENOUS; SUBCUTANEOUS
Status: DISCONTINUED | OUTPATIENT
Start: 2017-08-29 | End: 2017-08-29

## 2017-08-29 RX ORDER — ONDANSETRON 4 MG/1
4 TABLET, ORALLY DISINTEGRATING ORAL EVERY 30 MIN PRN
Status: DISCONTINUED | OUTPATIENT
Start: 2017-08-29 | End: 2017-08-29 | Stop reason: HOSPADM

## 2017-08-29 RX ORDER — EPHEDRINE SULFATE 50 MG/ML
5 INJECTION, SOLUTION INTRAMUSCULAR; INTRAVENOUS; SUBCUTANEOUS
Status: DISCONTINUED | OUTPATIENT
Start: 2017-08-29 | End: 2017-09-02 | Stop reason: CLARIF

## 2017-08-29 RX ORDER — SODIUM CHLORIDE, SODIUM LACTATE, POTASSIUM CHLORIDE, CALCIUM CHLORIDE 600; 310; 30; 20 MG/100ML; MG/100ML; MG/100ML; MG/100ML
INJECTION, SOLUTION INTRAVENOUS CONTINUOUS PRN
Status: DISCONTINUED | OUTPATIENT
Start: 2017-08-29 | End: 2017-08-29

## 2017-08-29 RX ORDER — NALOXONE HYDROCHLORIDE 0.4 MG/ML
.1-.4 INJECTION, SOLUTION INTRAMUSCULAR; INTRAVENOUS; SUBCUTANEOUS
Status: DISCONTINUED | OUTPATIENT
Start: 2017-08-29 | End: 2017-09-02 | Stop reason: CLARIF

## 2017-08-29 RX ORDER — OXYTOCIN/0.9 % SODIUM CHLORIDE 30/500 ML
340 PLASTIC BAG, INJECTION (ML) INTRAVENOUS CONTINUOUS PRN
Status: DISCONTINUED | OUTPATIENT
Start: 2017-08-29 | End: 2017-08-29

## 2017-08-29 RX ORDER — OXYTOCIN 10 [USP'U]/ML
INJECTION, SOLUTION INTRAMUSCULAR; INTRAVENOUS
Status: DISCONTINUED
Start: 2017-08-29 | End: 2017-08-29 | Stop reason: HOSPADM

## 2017-08-29 RX ORDER — IBUPROFEN 800 MG/1
800 TABLET, FILM COATED ORAL EVERY 6 HOURS PRN
Status: DISCONTINUED | OUTPATIENT
Start: 2017-08-29 | End: 2017-08-29

## 2017-08-29 RX ORDER — CITRIC ACID/SODIUM CITRATE 334-500MG
30 SOLUTION, ORAL ORAL
Status: DISCONTINUED | OUTPATIENT
Start: 2017-08-29 | End: 2017-08-29 | Stop reason: HOSPADM

## 2017-08-29 RX ORDER — IBUPROFEN 800 MG/1
800 TABLET, FILM COATED ORAL EVERY 6 HOURS PRN
Status: DISCONTINUED | OUTPATIENT
Start: 2017-08-30 | End: 2017-08-29

## 2017-08-29 RX ORDER — MORPHINE SULFATE 1 MG/ML
INJECTION, SOLUTION EPIDURAL; INTRATHECAL; INTRAVENOUS PRN
Status: DISCONTINUED | OUTPATIENT
Start: 2017-08-29 | End: 2017-08-29

## 2017-08-29 RX ORDER — OXYCODONE AND ACETAMINOPHEN 5; 325 MG/1; MG/1
1-2 TABLET ORAL EVERY 4 HOURS PRN
Status: DISCONTINUED | OUTPATIENT
Start: 2017-08-29 | End: 2017-08-29

## 2017-08-29 RX ORDER — CITRIC ACID/SODIUM CITRATE 334-500MG
SOLUTION, ORAL ORAL
Status: DISCONTINUED
Start: 2017-08-29 | End: 2017-08-29 | Stop reason: HOSPADM

## 2017-08-29 RX ORDER — BUPIVACAINE HYDROCHLORIDE 7.5 MG/ML
INJECTION, SOLUTION INTRASPINAL PRN
Status: DISCONTINUED | OUTPATIENT
Start: 2017-08-29 | End: 2017-08-29

## 2017-08-29 RX ORDER — CITRIC ACID/SODIUM CITRATE 334-500MG
30 SOLUTION, ORAL ORAL
Status: DISCONTINUED | OUTPATIENT
Start: 2017-08-29 | End: 2017-08-29

## 2017-08-29 RX ORDER — CEFAZOLIN SODIUM 2 G/100ML
2 INJECTION, SOLUTION INTRAVENOUS
Status: DISCONTINUED | OUTPATIENT
Start: 2017-08-29 | End: 2017-08-29

## 2017-08-29 RX ADMIN — OXYTOCIN-SODIUM CHLORIDE 0.9% IV SOLN 30 UNIT/500ML 125 ML/HR: 30-0.9/5 SOLUTION at 22:00

## 2017-08-29 RX ADMIN — BUPIVACAINE HYDROCHLORIDE IN DEXTROSE 1.6 ML: 7.5 INJECTION, SOLUTION SUBARACHNOID at 19:29

## 2017-08-29 RX ADMIN — CEFAZOLIN SODIUM 2 G: 2 INJECTION, SOLUTION INTRAVENOUS at 18:35

## 2017-08-29 RX ADMIN — FENTANYL CITRATE 50 MCG: 50 INJECTION, SOLUTION INTRAMUSCULAR; INTRAVENOUS at 20:07

## 2017-08-29 RX ADMIN — KETOROLAC TROMETHAMINE 30 MG: 30 INJECTION, SOLUTION INTRAMUSCULAR at 21:06

## 2017-08-29 RX ADMIN — MORPHINE SULFATE 0.2 MG: 5 INJECTION, SOLUTION INTRAVENOUS at 19:29

## 2017-08-29 RX ADMIN — ONDANSETRON 4 MG: 2 INJECTION INTRAMUSCULAR; INTRAVENOUS at 19:15

## 2017-08-29 RX ADMIN — FENTANYL CITRATE 50 MCG: 50 INJECTION, SOLUTION INTRAMUSCULAR; INTRAVENOUS at 20:09

## 2017-08-29 RX ADMIN — SODIUM CHLORIDE, POTASSIUM CHLORIDE, SODIUM LACTATE AND CALCIUM CHLORIDE: 600; 310; 30; 20 INJECTION, SOLUTION INTRAVENOUS at 19:31

## 2017-08-29 RX ADMIN — OXYTOCIN 10 UNITS: 10 INJECTION, SOLUTION INTRAMUSCULAR; INTRAVENOUS at 19:53

## 2017-08-29 RX ADMIN — SODIUM CHLORIDE, POTASSIUM CHLORIDE, SODIUM LACTATE AND CALCIUM CHLORIDE 1000 ML: 600; 310; 30; 20 INJECTION, SOLUTION INTRAVENOUS at 18:35

## 2017-08-29 RX ADMIN — OXYTOCIN-SODIUM CHLORIDE 0.9% IV SOLN 30 UNIT/500ML 500 ML: 30-0.9/5 SOLUTION at 19:53

## 2017-08-29 RX ADMIN — Medication: at 20:00

## 2017-08-29 ASSESSMENT — PAIN SCALES - GENERAL: PAINLEVEL: NO PAIN (0)

## 2017-08-29 ASSESSMENT — LIFESTYLE VARIABLES: TOBACCO_USE: 1

## 2017-08-29 NOTE — H&P
"[facility] OBSTETRICAL UPDATE/EXAM    Shari Arshad MRN# [id number]  Age: 31 year old YOB: 1985    Date of Admission:  2017    Home clinic: [facility]  Primary care provider: Zechariah Mcdonald     Assessment:   MIKIE:  2017, by Last Menstrual Period     LMP: Patient's last menstrual period was 2016 (exact date).               Estimated Fetal Weight:  7#8oz     Plan:   PIH at 38 wks gestation.   This patient is an acceptable risk for Surgery and the Following:    Anesthesia: Spinal     Postpartum Tubal: No    Obstetrical History:  Obstetric History       T0      L0     SAB0   TAB0   Ectopic0   Multiple0   Live Births0       # Outcome Date GA Lbr Stevan/2nd Weight Sex Delivery Anes PTL Lv   1 Current               Obstetric Comments   EDC 2017 based on LMP.  Parenting to Errol.        Prenatal Course:              Prenatal Record Reviewed    Complicated by PIH with BPs of 150-170s/      Past Medical History:  Past Medical History:   Diagnosis Date     Gastro-oesophageal reflux disease      Hypertension      Lactose intolerance      Rh(-), needs rhogam at 28 wks gestation 2017        Past Surgical History:  Past Surgical History:   Procedure Laterality Date     HC EXCIS PRIMARY GANGLION WRIST  3/24/2004    Excision, volar wrist ganglion, left.     HC EXCIS RECURRENT GANGLION WRIST  2006    Recurrent volar wrist ganglion, left.     OPEN REDUCTION INTERNAL FIXATION WRIST      Cadavar bone        Social History:  Social History     Social History     Marital status:      Spouse name: Errol     Number of children: N/A     Years of education: N/A     Occupational History           [employer]     Social History Main Topics     Smoking status: Current Every Day Smoker     Packs/day: 0.50     Types: Cigarettes     Smokeless tobacco: Never Used      Comment: \"Trying to quit\"   Reports 4-6 cig/day     Alcohol " use 0.0 oz/week     0 Standard drinks or equivalent per week      Comment: 2x a month not while pregnant     Drug use: No     Sexual activity: Yes     Partners: Male     Other Topics Concern      Service No     Blood Transfusions No     Caffeine Concern No     pop- 1-2 times qweek     Occupational Exposure Yes     TB     Hobby Hazards No     Sleep Concern No     Stress Concern No     Weight Concern No     Special Diet Yes     lactose intol     Back Care No     Exercise Yes     curves     Bike Helmet No     Seat Belt Yes     Self-Exams Yes     Social History Narrative    Lives in [place] with Errol.  They both smoke.  She is trying to quit.  Discussed risks of exposure to secondhand smoke.  Advised about toxoplasmosis precautions.          Family History:  Family History   Problem Relation Age of Onset     Lipids Mother      Musculoskeletal Disorder Mother      ms     Allergies Mother      Depression Mother      Genitourinary Problems Mother      Respiratory Mother      CPAP for sleep apnea     Multiple Sclerosis Mother      Hypertension Father      Lipids Father      Hypertension Paternal Grandmother      Hypertension Paternal Grandfather      CEREBROVASCULAR DISEASE Maternal Grandmother      CANCER Maternal Grandmother      ovarian     Gynecology Maternal Grandmother      ovarian ca     Asthma Sister      Psychotic Disorder Sister      depression (s/p an amputation at age 3)     Respiratory Sister      CPAP for sleep apnea     DIABETES Sister      adult-onset     DIABETES Maternal Uncle      type 2        Allergies:  Allergies   Allergen Reactions     Fruit Extracts Hives     Fruits from trees only if she touches them     Milk [Lac Bovis] GI Disturbance     No Known Drug Allergies         Review of Systems:  C: NEGATIVE for fever, chills, change in weight  E/M: NEGATIVE for ear, mouth and throat problems  R: NEGATIVE for significant cough or SOB  CV: NEGATIVE for chest pain, palpitations or peripheral  edema    Physical Exam:  Vitals were reviewed  Patient Vitals for the past 12 hrs:   BP Temp Temp src Pulse Resp   08/29/17 1645 - - - 65 18   08/29/17 1615 159/78 - - 62 18   08/29/17 1545 147/88 - - - -   08/29/17 1521 (!) 157/91 - - 60 16   08/29/17 1448 (!) 158/94 98.4  F (36.9  C) Oral 70 16     Lungs:   No increased work of breathing, good air exchange, clear to auscultation bilaterally, no crackles or wheezing     Cardiovascular:   Normal apical impulse, regular rate and rhythm, normal S1 and S2, no S3 or S4, and no murmur noted     Abdomen:   Gravid, transverse.      Musculoskeletal:   Trace edema, 2+ DTRs no clonus                 Position:  Breech  Membranes:  intact  Fetal Heart Rate Tracing: reactive and reassuring  Tocometer: none    Clark Score:    Cervical Dilation:  Closed  Score = 0  Effacement:  50%  Score = 1  Station:  -3;  Score = 0  Consistency: Soft;   Score = 2  Position of Cervix: Posterior;   Score = 0     Total Score     3    Prenatal Labs: Lab Results       Component                Value               Date                       ABO                      O                   06/20/2017                 RH                        Neg                06/20/2017                 AS                       Neg                 06/20/2017                 HEPBANG                  Nonreactive         01/09/2017                 CHPCRT                                       09/21/2011             Negative for C. trachomatis rRNA by transcription mediated amplification.  A negative result by transcription mediated amplification does not preclude the  presence of C. trachomatis infection because results are dependent on proper  and adequate collection, absence of inhibitors, and sufficient rRNA to be  detected.       GCPCRT                                       09/21/2011             Negative for N. gonorrhoeae rRNA by transcription mediated amplification.  A negative result by transcription mediated  amplification does not preclude the  presence of N. gonorrhoeae infection because results are dependent on proper  and adequate collection, absence of inhibitors, and sufficient rRNA to be  detected.       TREPAB                   Negative            08/15/2017                 HGB                      13.8                08/29/2017              GBS Status:   Lab Results       Component                Value               Date                       GBS                      Positive (A)        08/15/2017              Attestation:  I have reviewed today's vital signs, notes, medications, labs and imaging.  Amount of time performed on this history and physical: 20 minutes.    Electronically signed by:  Zechariah Mcdonald M.D.  8/29/2017

## 2017-08-29 NOTE — PROGRESS NOTES
S: Triage Arrival sent up from the clinic   B: Shari is a 31 y.o.  @ 38w 0d who presents with complaint of elevated blood pressure  A: not placed on the monitor yet. U/S was ordered for biophysical and fetal position. MD questions if baby is breech.   R: Will notify MD to obtain further orders. UA sent to lab. All other labs were drawn prior to arrival to the [facility].

## 2017-08-29 NOTE — PROGRESS NOTES
Taking PNVs, BP is very elevated today.  She denies any HA, visual disturbances, epigastric or RUQ pain.  She has been getting swelling in her legs at work but it is better after she wakes.  She had chronic HTN prior to losing 80# but has gained almost 50# during the pregnancy.  PIH labs are obtained, she will go to L&D for a BPP and monitoring and if her BP is still elevated, will discuss delivery.  If she is not cephalic, would recommend a CS as the mode of delivery since she has an unfavorable cervix, so an ECV and pitocin induction might not be feasible for her.  L&D was notified of her coming to them for monitoring and the BPP.    Electronically signed by:  Zechariah Mcdonald M.D.  8/29/2017

## 2017-08-29 NOTE — PROGRESS NOTES
Dr. Mcdonald at the bedside it was decided that a  would be done. Consent signed. IV started. Antibiotic infusing. Pradip cleanse done.

## 2017-08-29 NOTE — NURSING NOTE
"Chief Complaint   Patient presents with     Prenatal Care     38w0d    Initial BP (!) 166/122  Pulse 90  Temp 96.5  F (35.8  C) (Tympanic)  Resp 16  Wt 248 lb (112.5 kg)  LMP 12/06/2016 (Exact Date)  SpO2 98%  BMI 41.27 kg/m2 Estimated body mass index is 41.27 kg/(m^2) as calculated from the following:    Height as of 5/23/17: 5' 5\" (1.651 m).    Weight as of this encounter: 248 lb (112.5 kg).  Medication Reconciliation: complete    "

## 2017-08-29 NOTE — IP AVS SNAPSHOT
[facility]    [address]     [place] [address]    Phone:  [phone]                                       After Visit Summary   8/29/2017    Shari Arshad    MRN: [id number]           After Visit Summary Signature Page     I have received my discharge instructions, and my questions have been answered. I have discussed any challenges I see with this plan with the nurse or doctor.    ..........................................................................................................................................  Patient/Patient Representative Signature      ..........................................................................................................................................  Patient Representative Print Name and Relationship to Patient    ..................................................               ................................................  Date                                            Time    ..........................................................................................................................................  Reviewed by Signature/Title    ...................................................              ..............................................  Date                                                            Time

## 2017-08-29 NOTE — ANESTHESIA PREPROCEDURE EVALUATION
Anesthesia Evaluation     . Pt has had prior anesthetic. Type: General           ROS/MED HX    ENT/Pulmonary:     (+)tobacco use, Current use , . .    Neurologic:  - neg neurologic ROS     Cardiovascular: Comment: Pregnancy induced hypertension    (+) hypertension----. : . . . :. .       METS/Exercise Tolerance:     Hematologic:  - neg hematologic  ROS       Musculoskeletal:  - neg musculoskeletal ROS       GI/Hepatic:     (+) GERD       Renal/Genitourinary:  - ROS Renal section negative       Endo:  - neg endo ROS       Psychiatric:  - neg psychiatric ROS       Infectious Disease:  - neg infectious disease ROS       Malignancy:      - no malignancy   Other:    - neg other ROS                 Physical Exam  Normal systems: cardiovascular, pulmonary and dental    Airway   Mallampati: I  TM distance: >3 FB  Neck ROM: full    Dental     Cardiovascular   Rhythm and rate: regular and normal      Pulmonary    breath sounds clear to auscultation                    Anesthesia Plan      History & Physical Review  History and physical reviewed and following examination; no interval change.    ASA Status:  2 .        Plan for Spinal   PONV prophylaxis:  Ondansetron (or other 5HT-3)       Postoperative Care  Postoperative pain management:  Multi-modal analgesia.      Consents  Anesthetic plan, risks, benefits and alternatives discussed with:  Patient..                          .

## 2017-08-29 NOTE — MR AVS SNAPSHOT
After Visit Summary   8/29/2017    Shari Arshad    MRN: [id number]           Patient Information     Date Of Birth          1985        Visit Information        Provider Department      8/29/2017 1:40 PM Zechariah Mcdonald MD [facility]        Today's Diagnoses     Encounter for supervision of normal first pregnancy in third trimester    -  1    Transient hypertension of pregnancy, antepartum, third trimester           Follow-ups after your visit        Your next 10 appointments already scheduled     Sep 07, 2017 10:50 AM CDT   ESTABLISHED PRENATAL with Zechariah [facility]cdonald [facility]   [facility]    [address] [place] [id number] [phone]            Sep 12, 2017 11:10 AM CDT   ESTABLISHED PRENATAL with Zechariah Mcdonald MD   [facility] ([address] [id number] [phone]              Who to contact     If you have questions or need follow up information about today's clinic visit or your schedule please contact [facility] directly at [phone].  Normal or non-critical lab and imaging results will be communicated to you by [employer], letter or phone within 4 business days after the clinic has received the results. If you do not hear from us within 7 days, please contact the clinic through [employer] or phone. If you have a critical or abnormal lab result, we will notify you by phone as soon as possible.  Submit refill requests through [employer] or call your pharmacy and they will forward the refill request to us. Please allow 3 business days for your refill to be completed.          Additional Information About Your Visit        [employer] Information     [employer] gives you secure access to your electronic health record. If you see a primary care provider, you can also send messages to your care team and make appointments. If you have  questions, please call your primary care clinic.  If you do not have a primary care provider, please call [phone] and they will assist you.        Care EveryWhere ID     This is your Care EveryWhere ID. This could be used by other organizations to access your [facility] medical records  [id number]        Your Vitals Were     Pulse Temperature Respirations Last Period Pulse Oximetry BMI (Body Mass Index)    90 96.5  F (35.8  C) (Tympanic) 16 12/06/2016 (Exact Date) 98% 41.27 kg/m2       Blood Pressure from Last 3 Encounters:   08/29/17 (!) 166/122   08/23/17 124/72   08/15/17 130/80    Weight from Last 3 Encounters:   08/29/17 248 lb (112.5 kg)   08/23/17 247 lb (112 kg)   08/15/17 249 lb (112.9 kg)              We Performed the Following     **ALT FUTURE 1yr     AST     CBC with platelets     Creatinine     Urea nitrogen     Uric acid        Primary Care Provider Office Phone # Fax #    Zechariah Mcdonald [phone]       [address] [place] [address]        Equal Access to Services     [facility]: Hadii aad ku hadasho Sotieraali, waaxda luqadaha, qaybta kaalmada adeegyada, ashley donohuen reece gore . So [facility] [phone].    ATENCIÓN: Si habla español, tiene a meyers disposición servicios gratuitos de asistencia lingüística. Llame al [phone].    We comply with applicable federal civil rights laws and [place] laws. We do not discriminate on the basis of race, color, national origin, age, disability sex, sexual orientation or gender identity.            Thank you!     Thank you for choosing [facility]  for your care. Our goal is always to provide you with excellent care. Hearing back from our patients is one way we can continue to improve our services. Please take a few minutes to complete the written survey that you may receive in the mail after your visit with us. Thank you!             Your Updated Medication List - Protect others around you:  Learn how to safely use, store and throw away your medicines at www.disposemymeds.org.          This list is accurate as of: 8/29/17  2:04 PM.  Always use your most recent med list.                   Brand Name Dispense Instructions for use Diagnosis    omeprazole 20 MG CR capsule    priLOSEC    90 capsule    Take 1 capsule (20 mg) by mouth daily    Heartburn during pregnancy in third trimester       prenatal multivitamin plus iron 27-0.8 MG Tabs per tablet     100 tablet    Take 1 tablet by mouth daily    Encounter for supervision of normal first pregnancy in first trimester

## 2017-08-29 NOTE — PROGRESS NOTES
Dr. Mcdonald was updated on pt's BP, BPP 8/8, fetal position, labs results.  MD will be up to see pt soon.  Report given to SALIMA Rothman RN to take over care at this time.  Pt was updated on MD's plan to come see her soon.

## 2017-08-29 NOTE — IP AVS SNAPSHOT
MRN:[id number]                      After Visit Summary   8/29/2017    Shari Arshad    MRN: [id number]           Thank you!     Thank you for choosing [facility] for your care. Our goal is always to provide you with excellent care. Hearing back from our patients is one way we can continue to improve our services. Please take a few minutes to complete the written survey that you may receive in the mail after you visit with us. Thank you!        Patient Information     Date Of Birth          1985        Designated Caregiver       Most Recent Value    Caregiver    Will someone help with your care after discharge? no      About your hospital stay     You were admitted on:  August 29, 2017 You last received care in the:  [facility]    You were discharged on:  September 2, 2017       Who to Call     For medical emergencies, please call 911.  For non-urgent questions about your medical care, please call your primary care provider or clinic, [phone]  For questions related to your surgery, please call your surgery clinic        Attending Provider     Provider Specialty    Zechariah Mcdonald MD Family Practice       Primary Care Provider Office Phone # Fax #    Zechariah Mcdonald [phone]      Your next 10 appointments already scheduled     Sep 07, 2017 10:50 AM CDT   ESTABLISHED PRENATAL with Zechariah Mcdonald MD   [facility] ([address] [address] [phone]            Sep 12, 2017 11:10 AM CDT   ESTABLISHED PRENATAL with Zechariah Mcdonald MD   [address] [id number] [phone]              Further instructions from your care team       [facility] Discharge Instructions     Discharge disposition:  Discharged to home       Diet:  Regular       Activity Lifting restricted to  20 pounds for the first 2 wks, then increase by 10 pounds every week thereafter.  No tub soaks for >15 minutes the first 2 wks then as desired.  No driving for 2 week(s).  No sex for 6 week(s).       Follow-up: Follow up with primary care provider in a week for a BP recheck       Additional instructions: Wound care / dressings: may shower and keep wound clean and dry  Stool softener: as needed for constipation      Postop  Birth Instructions    Activity       Do not lift more than 10 pounds for 6 weeks after surgery.  Ask family and friends for help when you need it.    No driving until you have stopped taking your pain medications (usually two weeks after surgery).    No heavy exercise or activity for 6 weeks.  Don't do anything that will put a strain on your surgery site.    Don't strain when using the toilet.  Your care team may prescribe a stool softener if you have problems with your bowel movements.     To care for your incision:       Keep the incision clean and dry.    Do not soak your incision in water. No swimming or hot tubs until it has fully healed. You may soak in the bathtub if the water level is below your incision.    Do not use peroxide, gel, cream, lotion, or ointment on your incision.    Adjust your clothes to avoid pressure on your surgery site (check the elastic in your underwear for example).     You may see a small amount of clear or pink drainage and this is normal.  Check with your health care provider:       If the drainage increases or has an odor.    If the incision reddens, you have swelling, or develop a rash.    If you have increased pain and the medicine we prescribed doesn't help.    If you have a fever above 100.4 F (38 C) with or without chills when placing thermometer under your tongue.   The area around your incision (surgery wound), will feel numb.  This is normal. The numbness should go away in less than a year.     Keep your hands clean:  Always wash your hands before  touching your incision (surgery wound). This helps reduce your risk of infection. If your hands aren't dirty, you may use an alcohol hand-rub to clean your hands. Keep your nails clean and short.    Call your healthcare provider if you have any of these symptoms:       You soak a sanitary pad with blood within 1 hour, or you see blood clots larger than a golf ball.    Bleeding that lasts more than 6 weeks.    Vaginal discharge that smells bad.    Severe pain, cramping or tenderness in your lower belly area.    A need to urinate more frequently (use the toilet more often), more urgently (use the toilet very quickly), or it burns when you urinate.    Nausea and vomiting.    Redness, swelling or pain around a vein in your leg.    Problems breastfeeding or a red or painful area on your breast.    Chest pain and cough or are gasping for air.    Problems with coping with sadness, anxiety or depression. If you have concerns about hurting yourself or the baby, call your provider immediately.      You have questions or concerns after you return home.                  Pending Results     Date and Time Order Name Status Description    8/30/2017 1446 Placenta Path Order and Indications (PLACENTA) In process     8/29/2017 2005 Surgical pathology exam In process             Statement of Approval     Ordered          09/02/17 1056  I have reviewed and agree with all the recommendations and orders detailed in this document.  EFFECTIVE NOW     Approved and electronically signed by:  Zechariah Mcdonald MD             Admission Information     Date & Time Provider Department Dept. Phone    8/29/2017 [facility] [phone]      Your Vitals Were     Blood Pressure Pulse Temperature Respirations Last Period Pulse Oximetry    169/94 62 98.5  F (36.9  C) (Oral) 16 12/06/2016 (Exact Date) 16%      MyChart Information     [employer] gives you secure access to your electronic health record. If you see  a primary care provider, you can also send messages to your care team and make appointments. If you have questions, please call your primary care clinic.  If you do not have a primary care provider, please call [phone] and they will assist you.        Care EveryWhere ID     This is your Care EveryWhere ID. This could be used by other organizations to access your [facility] medical records  [id number]        Equal Access to Services     DORA ARGUETA : Hadii clara ku hadasho Sotieraali, waaxda luqadaha, qaybta kaalmada adeegyada, waxay emily haydainaleyla lagunahannahzahra penn. So [facility] [phone].    ATENCIÓN: Si tim kat, tiene a meyers disposición servicios gratuitos de asistencia lingüística. Llame al [phone].    We comply with applicable federal civil rights laws and [place] laws. We do not discriminate on the basis of race, color, national origin, age, disability sex, sexual orientation or gender identity.               Review of your medicines      START taking        Dose / Directions    ibuprofen 800 MG tablet   Commonly known as:  ADVIL/MOTRIN        Dose:  800 mg   Take 1 tablet (800 mg) by mouth every 6 hours as needed for moderate pain   Quantity:  90 tablet   Refills:  1       oxyCODONE-acetaminophen 5-325 MG per tablet   Commonly known as:  PERCOCET        Dose:  1-2 tablet   Take 1-2 tablets by mouth every 4 hours as needed for moderate to severe pain   Quantity:  40 tablet   Refills:  0       senna-docusate 8.6-50 MG per tablet   Commonly known as:  SENOKOT-S;PERICOLACE   Used for:  Constipation due to pain medication        Dose:  1-2 tablet   Take 1-2 tablets by mouth 2 times daily   Quantity:  100 tablet   Refills:  1         CONTINUE these medicines which have NOT CHANGED        Dose / Directions    omeprazole 20 MG CR capsule   Commonly known as:  priLOSEC   Used for:  Heartburn during pregnancy in third trimester        Dose:  20 mg   Take 1 capsule (20 mg) by mouth daily   Quantity:  90  capsule   Refills:  3       prenatal multivitamin plus iron 27-0.8 MG Tabs per tablet   Used for:  Encounter for supervision of normal first pregnancy in first trimester        Dose:  1 tablet   Take 1 tablet by mouth daily   Quantity:  100 tablet   Refills:  3            Where to get your medicines      These medications were sent to [facility] - [address]  [address], [facility] [address]     Phone:  [phone]     ibuprofen 800 MG tablet    senna-docusate 8.6-50 MG per tablet         Some of these will need a paper prescription and others can be bought over the counter. Ask your nurse if you have questions.     Bring a paper prescription for each of these medications     oxyCODONE-acetaminophen 5-325 MG per tablet                Protect others around you: Learn how to safely use, store and throw away your medicines at www.disposemymeds.org.             Medication List: This is a list of all your medications and when to take them. Check marks below indicate your daily home schedule. Keep this list as a reference.      Medications           Morning Afternoon Evening Bedtime As Needed    ibuprofen 800 MG tablet   Commonly known as:  ADVIL/MOTRIN   Take 1 tablet (800 mg) by mouth every 6 hours as needed for moderate pain   Last time this was given:  800 mg on 9/2/2017  9:13 AM                                omeprazole 20 MG CR capsule   Commonly known as:  priLOSEC   Take 1 capsule (20 mg) by mouth daily                                oxyCODONE-acetaminophen 5-325 MG per tablet   Commonly known as:  PERCOCET   Take 1-2 tablets by mouth every 4 hours as needed for moderate to severe pain   Last time this was given:  1 tablet on 9/2/2017  9:13 AM                                prenatal multivitamin plus iron 27-0.8 MG Tabs per tablet   Take 1 tablet by mouth daily                                senna-docusate 8.6-50 MG per tablet   Commonly known as:  SENOKOT-S;PERICOLACE    Take 1-2 tablets by mouth 2 times daily   Last time this was given:  1 tablet on 9/2/2017  9:13 AM

## 2017-08-29 NOTE — CONSULTS
Shari Arshad is a patient of mine seen in the clinic for routine OB care when it was discovered that her BP was quite elevated, 162/117 and then 170/122.  She had no other complaints and has a history of HTN a number of years ago and was on medication until she lost 80# and was able to get off of her medication.  She has gained 50# during this pregnancy and her BPs have been normal up until today.  She has had some swelling in her feet but that resolves with rest.   She is a 31 year old female who is a , and is 38 wks gestation.      MEDICAL HISTORY  Past Medical History:   Diagnosis Date     Gastro-oesophageal reflux disease      Hypertension      Lactose intolerance      Rh(-), needs rhogam at 28 wks gestation 2017       SURGICAL HISTORY   Past Surgical History:   Procedure Laterality Date     HC EXCIS PRIMARY GANGLION WRIST  3/24/2004    Excision, volar wrist ganglion, left.     HC EXCIS RECURRENT GANGLION WRIST  2006    Recurrent volar wrist ganglion, left.     OPEN REDUCTION INTERNAL FIXATION WRIST      Cadavar bone       ALLERGIES     Allergies   Allergen Reactions     Fruit Extracts Hives     Fruits from trees only if she touches them     Milk [Lac Bovis] GI Disturbance     No Known Drug Allergies        CURRENT MEDICATIONS    Current Facility-Administered Medications:      NO Rho (D) immune globulin (RhoGam) needed - mother Rh NEGATIVE - NOT Clinically indicated at this time, , Does not apply, Continuous PRN, Zechariah Mcdonald MD     lactated ringers BOLUS 1,000 mL, 1,000 mL, Intravenous, Once, Zechariah Mcdonald MD     lactated ringers infusion, , Intravenous, Continuous, Zechariah Mcdonald MD     sodium citrate-citric acid (BICITRA) solution 30 mL, 30 mL, Oral, Pre-Op/Pre-procedure x 1 dose, Zechariah Mcdonald MD     ceFAZolin sodium-dextrose (ANCEF) infusion 2 g, 2 g, Intravenous, Pre-Op/Pre-procedure x 1 dose, Zechariah Mcdonald MD     BUPivacaine 0.5% (MARCAINE) 400 mL in ON-Q   "4 mL/hr (-P holds 400-500 mL) 5\" dual cath disposable pump, , Topical, Continuous, Zechariah Mcdonald MD    SOCIAL HISTORY  Social History     Social History     Marital status:      Spouse name: Errol     Number of children: N/A     Years of education: N/A     Occupational History           [employer]     Social History Main Topics     Smoking status: Current Every Day Smoker     Packs/day: 0.50     Types: Cigarettes     Smokeless tobacco: Never Used      Comment: \"Trying to quit\"   Reports 4-6 cig/day     Alcohol use 0.0 oz/week     0 Standard drinks or equivalent per week      Comment: 2x a month not while pregnant     Drug use: No     Sexual activity: Yes     Partners: Male     Other Topics Concern      Service No     Blood Transfusions No     Caffeine Concern No     pop- 1-2 times qweek     Occupational Exposure Yes     TB     Hobby Hazards No     Sleep Concern No     Stress Concern No     Weight Concern No     Special Diet Yes     lactose intol     Back Care No     Exercise Yes     curves     Bike Helmet No     Seat Belt Yes     Self-Exams Yes     Social History Narrative    Lives in [place] with Errol.  They both smoke.  She is trying to quit.  Discussed risks of exposure to secondhand smoke.  Advised about toxoplasmosis precautions.         FAMILY HISTORY  Family History   Problem Relation Age of Onset     Lipids Mother      Musculoskeletal Disorder Mother      ms     Allergies Mother      Depression Mother      Genitourinary Problems Mother      Respiratory Mother      CPAP for sleep apnea     Multiple Sclerosis Mother      Hypertension Father      Lipids Father      Hypertension Paternal Grandmother      Hypertension Paternal Grandfather      CEREBROVASCULAR DISEASE Maternal Grandmother      CANCER Maternal Grandmother      ovarian     Gynecology Maternal Grandmother      ovarian ca     Asthma Sister      Psychotic Disorder Sister      depression (s/p an " amputation at age 3)     Respiratory Sister      CPAP for sleep apnea     DIABETES Sister      adult-onset     DIABETES Maternal Uncle      type 2       IMMUNIZATIONS  Immunization History   Administered Date(s) Administered     DPT 1985, 1985, 1985, 1986, 1990     HIB 1986     Influenza (IIV3) 10/05/1999     Influenza Vaccine, 3 YRS +, IM (QUADRIVALENT W/PRESERVATIVES) 2017     MMR 1986     OPV, trivalent, live 1984, 1985, 1986, 1990     Rhogam 2017     TDAP Vaccine (Adacel) 2011, 2017       We discussed the potential complications of a  delivery.  We reviewed the entire consent form and the risks involved.  In general with a primary  the risks include but are not limited to blood loss which is on average 1000cc, post operative pain, infection, and potential injury to bowel bladder, other internal organs, and large blood vessels which could result in significant hemorrhage.  There is also potential for injury to the fetus, possible nerve damage or temporary loss of limb function or even temporary paralysis, and blood clots in the legs or pelvis. The ultimate risk would be loss of life.  With bleeding, if there is excessive blood loss, there could be the need for transfusion or even the need to do a  hysterectomy.    Regarding blood loss, there can be anywhere from 300 to 3000cc with the average being about 1000cc for a primary section.  If there is excessive blood loss or hemorrhage with a postpartum bleed or injury to uterine vessels during a  delivery, there may be need for blood transfusion and the inherent risks that come along with that including risk of HIV and hepatitis.  That risk of obtaining tainted blood is less than 1:100,000.  The patient and her partner had no further questions and had all of them answered to her satisfaction.    I also reviewed with her ways in which we could  monitor her labor actively with pitocin and internal monitors and if she did not progress with in the normal patterns according to the Marsh curve that we could then move on to  and not prolong her labor unnecessarily.  The patient was receptive to hearing this option, but still desires a primary section as her mode of delivery.    Objective:  /78  Pulse 65  Temp 98.4  F (36.9  C) (Oral)  Resp 18  LMP 2016 (Exact Date)    Exam:    Abdomen: gravid, question breech or transverse presentation.     Assesment:  Primiparous patient at 38 wks gestation with PIH and a breech/transverse presentation with an unfavorable cervix.   She is open to doing a primary  due to the above conditions.       Plan:  Primary Low Transverse Uterine  Section.    Scheduled for today at 19:00.  The OR crew was notified and on their way in for the delivery.   I spoke with Dr. Wright from [facility] and ran this by him and he is in agreement with me to get her delivered.        25 minutes were spent with this patient during this consultation with over 50% spent in counseling regarding her PIH/breech and primary CS.    Electronically signed by:  Zechariah Mcdonald M.D.  2017

## 2017-08-30 LAB
BLOOD BANK CMNT PATIENT-IMP: NORMAL
HGB BLD-MCNC: 12.9 G/DL (ref 11.7–15.7)

## 2017-08-30 PROCEDURE — 25000128 H RX IP 250 OP 636: Performed by: FAMILY MEDICINE

## 2017-08-30 PROCEDURE — 85018 HEMOGLOBIN: CPT | Performed by: FAMILY MEDICINE

## 2017-08-30 PROCEDURE — 36415 COLL VENOUS BLD VENIPUNCTURE: CPT | Performed by: FAMILY MEDICINE

## 2017-08-30 PROCEDURE — 12000029 ZZH R&B OB INTERMEDIATE

## 2017-08-30 PROCEDURE — 25000132 ZZH RX MED GY IP 250 OP 250 PS 637: Performed by: FAMILY MEDICINE

## 2017-08-30 RX ORDER — AMOXICILLIN 250 MG
1 CAPSULE ORAL 2 TIMES DAILY
Status: DISCONTINUED | OUTPATIENT
Start: 2017-08-30 | End: 2017-09-02

## 2017-08-30 RX ORDER — DEXTROSE, SODIUM CHLORIDE, SODIUM LACTATE, POTASSIUM CHLORIDE, AND CALCIUM CHLORIDE 5; .6; .31; .03; .02 G/100ML; G/100ML; G/100ML; G/100ML; G/100ML
INJECTION, SOLUTION INTRAVENOUS CONTINUOUS
Status: DISCONTINUED | OUTPATIENT
Start: 2017-08-30 | End: 2017-09-02 | Stop reason: HOSPADM

## 2017-08-30 RX ORDER — IBUPROFEN 800 MG/1
800 TABLET, FILM COATED ORAL EVERY 6 HOURS PRN
Status: DISCONTINUED | OUTPATIENT
Start: 2017-08-30 | End: 2017-09-02 | Stop reason: HOSPADM

## 2017-08-30 RX ORDER — OXYCODONE AND ACETAMINOPHEN 5; 325 MG/1; MG/1
1-2 TABLET ORAL EVERY 4 HOURS PRN
Status: DISCONTINUED | OUTPATIENT
Start: 2017-08-30 | End: 2017-09-02 | Stop reason: HOSPADM

## 2017-08-30 RX ADMIN — SODIUM CHLORIDE, SODIUM LACTATE, POTASSIUM CHLORIDE, CALCIUM CHLORIDE AND DEXTROSE MONOHYDRATE: 5; 600; 310; 30; 20 INJECTION, SOLUTION INTRAVENOUS at 01:45

## 2017-08-30 RX ADMIN — IBUPROFEN 800 MG: 800 TABLET ORAL at 21:26

## 2017-08-30 RX ADMIN — KETOROLAC TROMETHAMINE 30 MG: 30 INJECTION, SOLUTION INTRAMUSCULAR at 15:55

## 2017-08-30 RX ADMIN — KETOROLAC TROMETHAMINE 30 MG: 30 INJECTION, SOLUTION INTRAMUSCULAR at 03:02

## 2017-08-30 RX ADMIN — MAGNESIUM HYDROXIDE 30 ML: 400 SUSPENSION ORAL at 21:10

## 2017-08-30 RX ADMIN — OXYCODONE HYDROCHLORIDE AND ACETAMINOPHEN 1 TABLET: 5; 325 TABLET ORAL at 22:30

## 2017-08-30 RX ADMIN — SENNOSIDES AND DOCUSATE SODIUM 1 TABLET: 8.6; 5 TABLET ORAL at 21:09

## 2017-08-30 RX ADMIN — KETOROLAC TROMETHAMINE 30 MG: 30 INJECTION, SOLUTION INTRAMUSCULAR at 09:45

## 2017-08-30 NOTE — L&D DELIVERY NOTE
PREOPERATIVE DIAGNOSES:   1. Term intrauterine pregnancy at 38 weeks' gestation.   2. PIH  3. Breech presentation with unfavorable cervix.     POSTOPERATIVE DIAGNOSIS:   1. Term intrauterine pregnancy at 38 weeks' gestation.   2. PIH  3. Breech presentation with unfavorable cervix.    PROCEDURE: Primary low transverse  section.     SURGEON: Tamara   PRIMARY: Tamara  ASSISTANT: Elliot Zarate was asked to assist in the  Section because of the special skill set he possess in Myotomy repair, fascial closure, and special skin closure techniques.  Dr. Zarate was a vital assistant in all of the above listed procedures. His skills allowed us to shorten the length of the procedure considerably, which has been shown to decrease risk of infection, decrease post operative morbidity, and improve outcomes.    ANESTHESIA: Spinal with Ni Palomares.     INDICATIONS: Primiparous patient who is at 38 1/7 weeks' gestation. Please see my consult or EPIC note for details. Essentially she presented to the clinic for routine prenatal care and had significantly elevated BP.  She was admitted for further evaluation and the decision was made to deliver her by primary CS due to breech and unfavorable cervix.    PROCEDURE: Shari was taken to the operating room where spinal anesthesia was placed. She was then laid in the supine position where she was prepped and draped in the routine sterile fashion after a Abreu catheter was placed. Under adequate anesthesia, a Pfannenstiel incision was made with a #15 blade. The incision was extended down through the subcutaneous tissue with blunt and sharp dissection, cauterizing any bleeding as we went. The midline of the fascia was scored with a #15 blade and carried transversely in both directions with a curved Monahan scissors. The fascia was then dissected off the anterior surface of the rectus muscle inferiorly and superiorly with both sharp and blunt dissection. Again, any  bleeding was cauterized and taken care of at the time. The rectus muscles were then  in the midline and entered with sharp dissection. This was then extended with blunt dissection in both directions until adequate space was appreciated. A bladder flap was created by tenting the uterovesicular peritoneum with DeBakeys and incising with a Metzenbaum scissors. With good exposure, we then incised the uterus with a #15 blade, carefully getting down to the final layer. This was then broken through with a [place] and then the uterine incision was extended with finger dissection in both directions transversely. The presenting part was identified; it was then delivered through the abdominal incision atraumatically. The shoulders and remainder of the body were then delivered and spontaneous crying and respirations were noted with this viable . Suctioning through the mouth and nares was done at this time. The cord was clamped x2 and cut and the baby brought over to the warming table and nursing staff by the assistant. Cord bloods were then obtained.     The placenta was then removed manually intact. The cut surface of the uterus was then isolated with Colindres clamps. The uterine incision was closed using 0 Vicryl in a running locked fashion, followed by a second imbricating layer of 0 Monocryl. Any bleeding was controlled with figure-of-eight sutures. Hemostasis was excellent.     The bladder flap was not repaired.     Irrigation was then done to remove any blood clots and debris. On reinspection, the uterine scar was dry and clear of any bleeding.     The parietal peritoneum was then closed using 0 Vicryl in a running fashion. The rectus muscles were brought together to the midline with 0 Vicryl interrupted sutures.     The corners of the fascia on either end were isolated with Kocher clamps. I then placed the first On-Q catheter underneath the fascia by taking a lateral approach. The fascia was then closed over  the top of this with 0 Vicryl in a running fashion. The second On-Q catheter was then placed above the fascia in the subq layer and the subq tissue was closed by using   3 interrupted sutures of 2-0 plain catgut suture. The skin was then closed with Insorb resorbable staples and sealed with Dermabond. I also sealed the catheter sites of the On-Q pump with Dermabond and placed a Tegaderm over the top of this. A sterile pressure dressing was then placed over the abdominal incision.     VARIATIONS: None .     ESTIMATED BLOOD LOSS: 600 mL.     TOTAL FLUIDS IN: 1800 ml of LR.     TOTAL URINE OUTPUT: About 200 mL of clear urine.     Both mom and this female  with apgars of 9 and 9 at 1 and 5 min respectively, which weighed 5 pounds 3 ounces or 2381 grams were stable upon me leaving the delivery room.     OPERATING ROOM TIMES:   Into the operating room at 1918, time of incision , time of delivery , and time of closure .     Electronically signed by:  Zechariah Mcdonald M.D.  2017

## 2017-08-30 NOTE — PLAN OF CARE
Problem: Goal Outcome Summary  Goal: Goal Outcome Summary  Outcome: Improving  S: Shift review  B:Shari is a , 11 hrs post  P- birth for breech presentation and PIH.  A: Stable post-op, VSS, incisional dressing is dry & intact , Lung sounds-clear, bowel sounds active in all 4 quadrants, no nausea. Good fluid intake and U.O. over 2000mls during the night., pain control achieved with IV Toradol. Handles baby with increasing confidence. Needs assistance at times with proper latch onto nipple shield and positioning. [place] has had poor latch and effort during the night but is at breast now with better latch and rhythmic sucking. Patient has a breast pump at home and will have it brought in today with plans to start pumping here. Postpartum hgb level 12.9 today.  R: Continue with plan of care and breastfeeding support. Plan to get out of bed this morning and remove colin cath later today. Report to TONO Cadena now.

## 2017-08-30 NOTE — PROGRESS NOTES
S: Shift review  B:Shari is a , day 1 post  birth.  A: Stable post-op, incisional dressing is dry & intact , Lung sounds-clear, bowel sounds active in all 4 quadrants, independent with mobility, pain control achieved with p.o. pain meds. Handles baby with confidence.  R: Continue with plan of care. Offer pain meds routinely.

## 2017-08-30 NOTE — OR NURSING
Transfer from  PACU to Room 355  Transferred to bed via bed   S: 32 y/o female  S/P        Anesthesia Type:  Spinal with Astromorph       Surgeon:  Dr. Mcdonald       Allergies:  See Medication Reconciliation Record       DNR: no    B:  Pertinent Medical History:GERD, Pregnancy induced hypertension, RH negative             Surgical History:  B/l wrists, ganglions    A:  EBL: 600        IVF:  2400        UOP:  625        NPO:  No         Vomiting: No         Drainage: small        Skin Integrity:WDL         RFO:Yes_foley        SSI Patient?  No        Brace/sling/equipment:  _no       See PACU record for ongoing assessment, vital signs and pain assessment.    R: Post-Op vitals and assessments as ordered/indicated per patient's condition.       Follow Post-Op orders and notify Physician prn.       Continue to involve patient/family in plan of care and discharge planning.       Reinforce Pre-Operative education.       Implement skin safety interventions as appropriate.       Astromorph in spinal  Name: Sada Yoo RN

## 2017-08-30 NOTE — ANESTHESIA CARE TRANSFER NOTE
Patient: Shari Arshad    Procedure(s):   Section - Wound Class: II-Clean Contaminated    Diagnosis: Maternal Hypertension  Diagnosis Additional Information: No value filed.    Anesthesia Type:   Spinal     Note:  Airway :Room Air  Patient transferred to:Labor and Delivery  Comments: Patient comfortable upon arrival into Labor and Delivery PACU. VSS. No c/o pain or nausea. Will follow as needed.       Vitals: (Last set prior to Anesthesia Care Transfer)    CRNA VITALS  2017 - 2017             Pulse: (!)  48    SpO2: 98 %                Electronically Signed By: QASIM Walters CRNA  2017  8:38 PM

## 2017-08-30 NOTE — ANESTHESIA POSTPROCEDURE EVALUATION
Patient: Shari Arshad    Procedure(s):   Section - Wound Class: II-Clean Contaminated    Diagnosis:Maternal Hypertension  Diagnosis Additional Information: No value filed.    Anesthesia Type:  Spinal    Note:  Anesthesia Post Evaluation    Patient location during evaluation: Bedside  Patient participation: Able to fully participate in evaluation  Level of consciousness: awake and alert  Pain management: adequate  Airway patency: patent  Respiratory status: spontaneous ventilation and room air  Hydration status: balanced  PONV: none     Anesthetic complications: None    Comments: Patient was happy with her anesthetic.  Her pain has been managed today with her pain medications well. She has no concerns at this time.         Last vitals:  Vitals:    17 0330 17 0800 17 1245   BP: 152/66 146/78 151/79   Pulse:      Resp: 16 18 18   Temp:  97.8  F (36.6  C) 98.5  F (36.9  C)   SpO2: 98% 97%          Electronically Signed By: QASIM Ferrell CRNA  2017  3:41 PM

## 2017-08-30 NOTE — PLAN OF CARE
Problem: Individualization  Goal: Patient Preferences  Outcome: Improving  S: Shift review   B:Shari is a  who delivered  section on   A: VSS, patient is independent with mobility, pain well controlled with p.o. pain meds. Handles baby with confidence.  R: Continue with routine PP care

## 2017-08-30 NOTE — ANESTHESIA PROCEDURE NOTES
Peripheral nerve/Neuraxial procedure note : intrathecal  Pre-Procedure  Performed by  MORRO FRANKLIN   Location: OR      Pre-Anesthestic Checklist: patient identified, IV checked, risks and benefits discussed, informed consent, monitors and equipment checked and pre-op evaluation    Timeout  Correct Patient: Yes   Correct Procedure: Yes   Correct Site: Yes   Correct Laterality: N/A   Correct Position: Yes   Site Marked: N/A   .   Procedure Documentation    .    Procedure:    Intrathecal.  Insertion Site:L3-4  (midline approach)      Patient Prep;mask, sterile gloves, povidone-iodine 7.5% surgical scrub, patient draped.  .  Needle: Yousif tip Spinal Needle (gauge): 25  Spinal/LP Needle Length (inches): 3.5 # of attempts: 1 and  # of redirects:  2 Introducer used Introducer: 20 G .       Assessment/Narrative  Paresthesias: No.  .  .  clear CSF fluid removed while sitting   . Time Injected: 19:29  Sensory Level: T5 Comments:  Patient tolerated procedure well. One attempt with 2 redirects. CSF aspirated back. no heme or paresthesias noted.

## 2017-08-30 NOTE — PROGRESS NOTES
[facility] Obstetrics Post-Op / Progress Note         Assessment and Plan:    Assessment:   Post-operative day #1  Low transverse primary  section  L&D complications: Maternal Hypertension  Breech      Doing well.  Normal healing wound.  No immediate surgical complications identified.  No excessive bleeding  Pain well-controlled.      Plan:   Ambulation encouraged  Breast feeding strategies discussed  Lactation consultation  Monitor wound for signs of infection  Pain control measures as needed  Reportable signs and symptoms dicussed with the patient           Interval History:   Doing well.  Pain is controlled.  No fevers.  No history of wound drainage, warmth or significant erythema.  Good appetite.  Denies chest pain, shortness of breath, nausea or vomiting.  Breastfeeding is slow but she is latching well but falling asleep            Significant Problems:    None          Review of Systems:    The patient denies any chest pain, shortness of breath, excessive pain, fever, chills, purulent drainage from the wound, nausea or vomiting.          Medications:   All medications related to the patient's surgery have been reviewed          Physical Exam:     All vitals stable  Patient Vitals for the past 12 hrs:   BP Temp Temp src Heart Rate Resp SpO2   17 0800 146/78 97.8  F (36.6  C) Oral 58 18 97 %   17 0330 152/66 - - - 16 98 %   17 0230 144/72 - - - 14 97 %   17 0130 149/68 - - - 16 97 %   17 0030 151/72 - - - 16 98 %   17 2330 138/70 97.1  F (36.2  C) Oral - 14 97 %   17 2300 161/81 - - - 18 -   17 2245 176/84 - - - 18 98 %   17 2230 159/84 - - - - -   17 2215 155/87 - - - - 97 %   17 2200 143/77 98.1  F (36.7  C) Oral - 18 98 %   17 2130 135/88 - - - 16 98 %   17 2125 152/88 - - - 16 97 %   17 2120 (!) 166/94 - - - 16 98 %   17 2105 156/83 - - - 16 97 %   17 2100 152/90 - - - 16 97 %   17 143/74  - - - 16 97 %     LUNGS:  Clear to auscultation bilaterally, no wheezing, rhonci or rales.  CV:  RRR nl S1/S2 no murmur.  Abdomen: dressing is intact and dry   Extremities: compression boots are on           Data:     All laboratory data related to this surgery reviewed  Hemoglobin   Date Value Ref Range Status   08/30/2017 12.9 11.7 - 15.7 g/dL Final   08/29/2017 13.8 11.7 - 15.7 g/dL Final   08/15/2017 14.4 11.7 - 15.7 g/dL Final   05/23/2017 12.4 11.7 - 15.7 g/dL Final   01/09/2017 14.6 11.7 - 15.7 g/dL Final     No imaging studies have been ordered    Electronically signed by:  Zechariah Mcdonald M.D.  8/30/2017

## 2017-08-31 LAB — COPATH REPORT: NORMAL

## 2017-08-31 PROCEDURE — 25000132 ZZH RX MED GY IP 250 OP 250 PS 637: Performed by: FAMILY MEDICINE

## 2017-08-31 PROCEDURE — 12000027 ZZH R&B OB

## 2017-08-31 RX ORDER — CALCIUM CARBONATE 500 MG/1
500 TABLET, CHEWABLE ORAL DAILY PRN
Status: DISCONTINUED | OUTPATIENT
Start: 2017-08-31 | End: 2017-09-02 | Stop reason: HOSPADM

## 2017-08-31 RX ADMIN — OXYCODONE HYDROCHLORIDE AND ACETAMINOPHEN 1 TABLET: 5; 325 TABLET ORAL at 08:30

## 2017-08-31 RX ADMIN — IBUPROFEN 800 MG: 800 TABLET ORAL at 08:29

## 2017-08-31 RX ADMIN — IBUPROFEN 800 MG: 800 TABLET ORAL at 03:06

## 2017-08-31 RX ADMIN — SENNOSIDES AND DOCUSATE SODIUM 1 TABLET: 8.6; 5 TABLET ORAL at 08:29

## 2017-08-31 RX ADMIN — IBUPROFEN 800 MG: 800 TABLET ORAL at 21:11

## 2017-08-31 RX ADMIN — IBUPROFEN 800 MG: 800 TABLET ORAL at 14:28

## 2017-08-31 RX ADMIN — OXYCODONE HYDROCHLORIDE AND ACETAMINOPHEN 1 TABLET: 5; 325 TABLET ORAL at 19:13

## 2017-08-31 RX ADMIN — SENNOSIDES AND DOCUSATE SODIUM 1 TABLET: 8.6; 5 TABLET ORAL at 21:11

## 2017-08-31 RX ADMIN — OXYCODONE HYDROCHLORIDE AND ACETAMINOPHEN 1 TABLET: 5; 325 TABLET ORAL at 03:06

## 2017-08-31 RX ADMIN — OXYCODONE HYDROCHLORIDE AND ACETAMINOPHEN 1 TABLET: 5; 325 TABLET ORAL at 13:16

## 2017-08-31 NOTE — PLAN OF CARE
"Problem: Individualization  Goal: Patient Preferences  Outcome: Improving  S: Shift review   B:Shari is a  who delivered  section for HTN. on   A: VSS, patient is independent with mobility, pain well controlled with p.o. pain meds Incision draining serosanguinous drainage- incision reglued with dermabond. BF fair- pt stated \" planning to pump and feed with a dropper until infant  Grows bigger and stabilizes the BG\". Handles baby with confidence.  R: Continue with routine PP care      "

## 2017-08-31 NOTE — PROGRESS NOTES
[facility] Obstetrics Post-Op / Progress Note         Assessment and Plan:    Assessment:   Post-operative day #2  Low transverse primary  section  L&D complications: Maternal Hypertension      Doing well.  Clean wound without signs of infection.  Normal healing wound.  No immediate surgical complications identified.  No excessive bleeding  Pain well-controlled.      Plan:   Ambulation encouraged  Breast feeding strategies discussed  Lactation consultation  Monitor wound for signs of infection  Pain control measures as needed  Reportable signs and symptoms dicussed with the patient           Interval History:   Doing well.  Pain is controlled.  No fevers.  No history of wound drainage, warmth or significant erythema.  Good appetite.  Denies chest pain, shortness of breath, nausea or vomiting.  Breastfeeding not going well and needing to supplement and pump.             Significant Problems:    None          Review of Systems:    The patient denies any chest pain, shortness of breath, excessive pain, fever, chills, purulent drainage from the wound, nausea or vomiting.          Medications:   All medications related to the patient's surgery have been reviewed          Physical Exam:     All vitals stable  Patient Vitals for the past 24 hrs:   BP Temp Temp src Pulse Heart Rate Resp SpO2   17 0714 (!) 162/100 98.5  F (36.9  C) Oral 67 - 18 99 %   17 0310 - 99.2  F (37.3  C) Oral - 74 18 -     CV:  RRR nl S1/S2 no murmur.  LUNGS:  Clear to auscultation bilaterally, no wheezing, rhonci or rales.  Abdomen: FH at Umbilicus and appropriately tender. Dressing removed and the dermabond was not sealing the wound well so the right aspect of the incision was leaking.  The OnQ sites are dry.  The pump was clamped and the incision reglued after the patient's shower and it is now dry.    Extremities: trace edema.            Data:     All laboratory data related to this surgery reviewed  Hemoglobin   Date  Value Ref Range Status   08/30/2017 12.9 11.7 - 15.7 g/dL Final   08/29/2017 13.8 11.7 - 15.7 g/dL Final   08/15/2017 14.4 11.7 - 15.7 g/dL Final   05/23/2017 12.4 11.7 - 15.7 g/dL Final   01/09/2017 14.6 11.7 - 15.7 g/dL Final     No imaging studies have been ordered    Electronically signed by:  Zechariah Mcdonald M.D.  8/31/2017

## 2017-08-31 NOTE — PLAN OF CARE
Problem: Goal Outcome Summary  Goal: Goal Outcome Summary  Outcome: Improving  S: Shift review  B:Shari is a , day 2 post  birth.  A: Stable post-op, incisional dressing is dry & intact , Lung sounds-clear, bowel sounds active in all 4 quadrants, independent with mobility, pain control achieved with p.o. pain meds. Handles baby with confidence.  R: Continue with plan of care. Offer pain meds routinely.

## 2017-09-01 PROCEDURE — 12000027 ZZH R&B OB

## 2017-09-01 PROCEDURE — 25000132 ZZH RX MED GY IP 250 OP 250 PS 637: Performed by: FAMILY MEDICINE

## 2017-09-01 RX ADMIN — IBUPROFEN 800 MG: 800 TABLET ORAL at 16:31

## 2017-09-01 RX ADMIN — SENNOSIDES AND DOCUSATE SODIUM 2 TABLET: 8.6; 5 TABLET ORAL at 19:34

## 2017-09-01 RX ADMIN — CALCIUM CARBONATE (ANTACID) CHEW TAB 500 MG 500 MG: 500 CHEW TAB at 03:20

## 2017-09-01 RX ADMIN — SENNOSIDES AND DOCUSATE SODIUM 1 TABLET: 8.6; 5 TABLET ORAL at 09:24

## 2017-09-01 RX ADMIN — OXYCODONE HYDROCHLORIDE AND ACETAMINOPHEN 1 TABLET: 5; 325 TABLET ORAL at 19:34

## 2017-09-01 RX ADMIN — IBUPROFEN 800 MG: 800 TABLET ORAL at 09:24

## 2017-09-01 RX ADMIN — OXYCODONE HYDROCHLORIDE AND ACETAMINOPHEN 1 TABLET: 5; 325 TABLET ORAL at 13:44

## 2017-09-01 RX ADMIN — CALCIUM CARBONATE (ANTACID) CHEW TAB 500 MG 500 MG: 500 CHEW TAB at 12:33

## 2017-09-01 RX ADMIN — IBUPROFEN 800 MG: 800 TABLET ORAL at 03:20

## 2017-09-01 RX ADMIN — MAGNESIUM HYDROXIDE 30 ML: 400 SUSPENSION ORAL at 09:24

## 2017-09-01 RX ADMIN — OXYCODONE HYDROCHLORIDE AND ACETAMINOPHEN 1 TABLET: 5; 325 TABLET ORAL at 09:24

## 2017-09-01 NOTE — PROGRESS NOTES
[facility] Obstetrics Post-Op / Progress Note         Assessment and Plan:    Assessment:   Post-operative day #3  Low transverse primary  section  L&D complications: Maternal Hypertension  Breech presentation      Doing well.  Clean wound without signs of infection.  Normal healing wound.  No immediate surgical complications identified.  No excessive bleeding  Pain well-controlled.      Plan:   Ambulation encouraged  Breast feeding strategies discussed  Lactation consultation  Monitor wound for signs of infection  Pain control measures as needed  Reportable signs and symptoms dicussed with the patient  Anticipate discharge tomorrow.            Interval History:   Doing well.  Pain is controlled.  No fevers.  No history of wound drainage, warmth or significant erythema.  Good appetite.  Denies chest pain, shortness of breath, nausea or vomiting.  Breastfeeding is improving but she is still supplementing with formula until her milk lets in so the baby doesn't loose any more weight.             Significant Problems:    None          Review of Systems:    The patient denies any chest pain, shortness of breath, excessive pain, fever, chills, purulent drainage from the wound, nausea or vomiting.          Medications:   All medications related to the patient's surgery have been reviewed          Physical Exam:     Vitals were reviewed  All vitals stable  Patient Vitals for the past 12 hrs:   BP Temp Temp src Pulse Resp   17 1000 158/84 98  F (36.7  C) Oral 68 16   17 0344 161/86 - - 50 16   17 0051 (!) 171/91 98.5  F (36.9  C) Oral 68 18     LUNGS:  Clear to auscultation bilaterally, no wheezing, rhonci or rales.  CV:  RRR nl S1/S2 no murmur.  Abdomen: BS present and wound is dry and intact without redness or discharge  Extremities: no edema           Data:     All laboratory data related to this surgery reviewed  Hemoglobin   Date Value Ref Range Status   2017 12.9 11.7 - 15.7 g/dL  Final   08/29/2017 13.8 11.7 - 15.7 g/dL Final   08/15/2017 14.4 11.7 - 15.7 g/dL Final   05/23/2017 12.4 11.7 - 15.7 g/dL Final   01/09/2017 14.6 11.7 - 15.7 g/dL Final     No imaging studies have been ordered    Electronically signed by:  Zechariah Mcdonald M.D.  9/1/2017

## 2017-09-01 NOTE — PLAN OF CARE
Problem: Postpartum ( Delivery) (Adult)  Goal: Signs and Symptoms of Listed Potential Problems Will be Absent or Manageable (Postpartum)  Signs and symptoms of listed potential problems will be absent or manageable by discharge/transition of care (reference Postpartum ( Delivery) (Adult) CPG).   S: Shift review  B:Shari is a , day 3 post  birth.  A: Stable post-op, incisional dressing is dry & intact , Lung sounds-clear, bowel sounds active in all 4 quadrants, independent with mobility, pain control achieved with p.o. pain meds. Handles baby with confidence.  R: Continue with plan of care. Offer pain meds routinely.

## 2017-09-01 NOTE — PLAN OF CARE
Problem: Postpartum ( Delivery) (Adult)  Goal: Signs and Symptoms of Listed Potential Problems Will be Absent or Manageable (Postpartum)  Signs and symptoms of listed potential problems will be absent or manageable by discharge/transition of care (reference Postpartum ( Delivery) (Adult) CPG).   Outcome: Improving  S-(situation): End of shift note     B-(background): Post c/s, breast feeding.     A-(assessment): Doing better as day progresses.  Did feel overwhelmed with breast feeding issues.  See lactation clinician junior     R-(recommendations): Doing well.  Minimal drainage from incision.

## 2017-09-01 NOTE — LACTATION NOTE
S- had a 5# 38 week baby and wants to breastfeed  B- Baby had a lot of low sugar issues at first and baby does not breastfeed well. Mom is also pumping and supplmenting with breastmilk and formula  A- Mom is teary this mmorning trying to get baby to wake up at the breast then pumping andthen bottle feeding. I sat down and talked to mom at length and made a plan for her and baby.Mo was very happy with the plan and feels that it is what will work best for her and baby.  R- Mom will attempt to breastfeed baby if baby is very sleepy she will try to wake baby for about 10 minutes. If baby does not wake up mom will feed pumped breast milk and the add formula if baby still hungry. If baby will wake up and breast feed mom can breast feed as long as baby is sucking and awake. Mom will call me with any questions and concerns. BRIAN Rashid RN IBCLC

## 2017-09-02 VITALS
DIASTOLIC BLOOD PRESSURE: 94 MMHG | SYSTOLIC BLOOD PRESSURE: 169 MMHG | HEART RATE: 62 BPM | RESPIRATION RATE: 16 BRPM | OXYGEN SATURATION: 16 % | TEMPERATURE: 98.5 F

## 2017-09-02 PROCEDURE — 25000132 ZZH RX MED GY IP 250 OP 250 PS 637: Performed by: FAMILY MEDICINE

## 2017-09-02 RX ORDER — IBUPROFEN 800 MG/1
800 TABLET, FILM COATED ORAL EVERY 6 HOURS PRN
Qty: 90 TABLET | Refills: 1 | Status: SHIPPED | OUTPATIENT
Start: 2017-09-02 | End: 2018-03-07

## 2017-09-02 RX ORDER — AMOXICILLIN 250 MG
1-2 CAPSULE ORAL 2 TIMES DAILY
Status: DISCONTINUED | OUTPATIENT
Start: 2017-09-02 | End: 2017-09-02 | Stop reason: HOSPADM

## 2017-09-02 RX ORDER — CALCIUM CARBONATE 500 MG/1
1000 TABLET, CHEWABLE ORAL DAILY PRN
Status: DISCONTINUED | OUTPATIENT
Start: 2017-09-02 | End: 2017-09-02 | Stop reason: HOSPADM

## 2017-09-02 RX ORDER — OXYCODONE AND ACETAMINOPHEN 5; 325 MG/1; MG/1
1-2 TABLET ORAL EVERY 4 HOURS PRN
Qty: 40 TABLET | Refills: 0 | Status: SHIPPED | OUTPATIENT
Start: 2017-09-02 | End: 2017-09-12

## 2017-09-02 RX ORDER — AMOXICILLIN 250 MG
1-2 CAPSULE ORAL 2 TIMES DAILY
Qty: 100 TABLET | Refills: 1 | Status: SHIPPED | OUTPATIENT
Start: 2017-09-02 | End: 2018-03-07

## 2017-09-02 RX ADMIN — OXYCODONE HYDROCHLORIDE AND ACETAMINOPHEN 1 TABLET: 5; 325 TABLET ORAL at 04:39

## 2017-09-02 RX ADMIN — OXYCODONE HYDROCHLORIDE AND ACETAMINOPHEN 1 TABLET: 5; 325 TABLET ORAL at 09:13

## 2017-09-02 RX ADMIN — SENNOSIDES AND DOCUSATE SODIUM 1 TABLET: 8.6; 5 TABLET ORAL at 09:13

## 2017-09-02 RX ADMIN — CALCIUM CARBONATE (ANTACID) CHEW TAB 500 MG 500 MG: 500 CHEW TAB at 05:55

## 2017-09-02 RX ADMIN — OXYCODONE HYDROCHLORIDE AND ACETAMINOPHEN 1 TABLET: 5; 325 TABLET ORAL at 00:21

## 2017-09-02 RX ADMIN — IBUPROFEN 800 MG: 800 TABLET ORAL at 09:13

## 2017-09-02 RX ADMIN — IBUPROFEN 800 MG: 800 TABLET ORAL at 00:21

## 2017-09-02 NOTE — DISCHARGE INSTRUCTIONS
[facility] Discharge Instructions     Discharge disposition:  Discharged to home       Diet:  Regular       Activity Lifting restricted to 20 pounds for the first 2 wks, then increase by 10 pounds every week thereafter.  No tub soaks for >15 minutes the first 2 wks then as desired.  No driving for 2 week(s).  No sex for 6 week(s).       Follow-up: Follow up with primary care provider in a week for a BP recheck       Additional instructions: Wound care / dressings: may shower and keep wound clean and dry  Stool softener: as needed for constipation      Postop  Birth Instructions    Activity       Do not lift more than 10 pounds for 6 weeks after surgery.  Ask family and friends for help when you need it.    No driving until you have stopped taking your pain medications (usually two weeks after surgery).    No heavy exercise or activity for 6 weeks.  Don't do anything that will put a strain on your surgery site.    Don't strain when using the toilet.  Your care team may prescribe a stool softener if you have problems with your bowel movements.     To care for your incision:       Keep the incision clean and dry.    Do not soak your incision in water. No swimming or hot tubs until it has fully healed. You may soak in the bathtub if the water level is below your incision.    Do not use peroxide, gel, cream, lotion, or ointment on your incision.    Adjust your clothes to avoid pressure on your surgery site (check the elastic in your underwear for example).     You may see a small amount of clear or pink drainage and this is normal.  Check with your health care provider:       If the drainage increases or has an odor.    If the incision reddens, you have swelling, or develop a rash.    If you have increased pain and the medicine we prescribed doesn't help.    If you have a fever above 100.4 F (38 C) with or without chills when placing thermometer under your tongue.   The area around your  incision (surgery wound), will feel numb.  This is normal. The numbness should go away in less than a year.     Keep your hands clean:  Always wash your hands before touching your incision (surgery wound). This helps reduce your risk of infection. If your hands aren't dirty, you may use an alcohol hand-rub to clean your hands. Keep your nails clean and short.    Call your healthcare provider if you have any of these symptoms:       You soak a sanitary pad with blood within 1 hour, or you see blood clots larger than a golf ball.    Bleeding that lasts more than 6 weeks.    Vaginal discharge that smells bad.    Severe pain, cramping or tenderness in your lower belly area.    A need to urinate more frequently (use the toilet more often), more urgently (use the toilet very quickly), or it burns when you urinate.    Nausea and vomiting.    Redness, swelling or pain around a vein in your leg.    Problems breastfeeding or a red or painful area on your breast.    Chest pain and cough or are gasping for air.    Problems with coping with sadness, anxiety or depression. If you have concerns about hurting yourself or the baby, call your provider immediately.      You have questions or concerns after you return home.

## 2017-09-02 NOTE — PROGRESS NOTES
S-(situation): Discharge  to home    B-(background): Patient had a  delivery because of breech presentation.  CS done at 38 weeks because of elevated blood pressures noted in the clinic prior to admission. . Baby girl. Baby's name is Mariam, SGA. both breast and bottle (Similac).  Supplementing at with formula at discharge due to low birth weight and loss of weight while inpatient : feeding every 2-3 hours;  10-24ml per feeding.  is Errol.      A-(assessment): Pt did have elevated blood pressures through out the night and at time of discharge.  Pt has denied any visual changes, headache or upper right abd quadrant pain.  No swelling noted in hands or feet.  One beat of clonus in left foot. 0 beats in right foot.    is aware of the above.  At this time he wishes to wait on further lab work or medication for the blood pressure.     R-(recommendations):   Discharge instructions reviewed and questions answered.  Reviewed with patients the signs of preeclampsia and the importance of immediate follow up should she develop any headache, visual changes or right upper abd pain.  Pt states she knows about this and verbalizes understanding of need to follow up   Belongings gathered and returned to the patient. Agreed to follow up on Thursday with  for a blood pressure check or sooner with any question or concerns.     Nursing Discharge Checklist:    Pneumovax screened and given, if appropriate: N/A  Influenza vaccine screened and given, if appropriate: N/A  Staples removed (): N/A  Breast milk returned: N/A  Hydrogel pads sent home:N/A  Birth Certificate Done: NO.  PT will stop back in on Wednesday when they come in for the  check.

## 2017-09-02 NOTE — PLAN OF CARE
Problem: Goal Outcome Summary  Goal: Goal Outcome Summary  Outcome: Improving  S: Shift review  B:Shari is a , day 4 post  birth.  A: Stable post-op, incision draining serosanguinous  fluid. No odor. Using intradry dressing to site. , Lung sounds-clear, bowel active in all 4 quadrants, Reports having a large BM. independent with mobility, pain control achieved with p.o. pain meds. Handles baby with confidence. Blood pressures elevated this shift.   R: Continue with plan of care. Offer pain meds routinely. Dr. Mcdonald to evaluate blood pressures this am

## 2017-09-02 NOTE — DISCHARGE SUMMARY
[facility] Discharge Summary    Shari Arshad MRN# [id number]   Age: 31 year old YOB: 1985     Date of Admission:  2017  Date of Discharge::  2017  Admitting Physician:  Zechariah Mcdonald MD  Discharge Physician:  Zechariah Mcdonald MD, MD     Home clinic: [facility]          Admission Diagnoses:   Elevated blood pressure affecting pregnancy in first trimester, antepartum  PIH (pregnancy induced hypertension)  Maternal Hypertension  S/P  section  PIH (pregnancy induced hypertension)  S/P  section          Discharge Diagnosis:   Same          Procedures:   Procedure(s): Primary low transverse  section       No other procedures performed during this admission           Medications Prior to Admission:     Prescriptions Prior to Admission   Medication Sig Dispense Refill Last Dose     omeprazole (PRILOSEC) 20 MG CR capsule Take 1 capsule (20 mg) by mouth daily 90 capsule 3 2017 at 0400     Prenatal Vit-Fe Fumarate-FA (PRENATAL MULTIVITAMIN  PLUS IRON) 27-0.8 MG TABS per tablet Take 1 tablet by mouth daily 100 tablet 3 2017 at 0400             Discharge Medications:     Current Discharge Medication List      START taking these medications    Details   oxyCODONE-acetaminophen (PERCOCET) 5-325 MG per tablet Take 1-2 tablets by mouth every 4 hours as needed for moderate to severe pain  Qty: 40 tablet, Refills: 0    Associated Diagnoses: S/P  section      ibuprofen (ADVIL/MOTRIN) 800 MG tablet Take 1 tablet (800 mg) by mouth every 6 hours as needed for moderate pain  Qty: 90 tablet, Refills: 1    Associated Diagnoses: S/P  section      senna-docusate (SENOKOT-S;PERICOLACE) 8.6-50 MG per tablet Take 1-2 tablets by mouth 2 times daily  Qty: 100 tablet, Refills: 1    Associated Diagnoses: Constipation due to pain medication         CONTINUE these medications which have NOT CHANGED    Details   omeprazole (PRILOSEC) 20 MG CR  capsule Take 1 capsule (20 mg) by mouth daily  Qty: 90 capsule, Refills: 3    Associated Diagnoses: Heartburn during pregnancy in third trimester      Prenatal Vit-Fe Fumarate-FA (PRENATAL MULTIVITAMIN  PLUS IRON) 27-0.8 MG TABS per tablet Take 1 tablet by mouth daily  Qty: 100 tablet, Refills: 3    Associated Diagnoses: Encounter for supervision of normal first pregnancy in first trimester                   Consultations:   Lactation consultant          Brief History of Labor or Admission:   Patient presented to the clinic for a routine prenatal visit and had extremely elevated BP (160-170/110-120s and a persistent breech presentation. She was 38 wks along so delivery was indicated.  She had a primary LTCS since she was breech and had an unfavorable cervix to attempt an ECV and induction.            Hospital Course:   The patient's hospital course was unremarkable.  She recovered as anticipated and experienced no post-operative complications.   Her BPs for the most part have been improved and not persistently >105 diastolic.   On discharge, her pain was well controlled. Vaginal bleeding is similar to peak menstrual flow.  Voiding without difficulty.  Ambulating well and tolerating a normal diet.  No fever or significant wound drainage.  Breastfeeding is finally going better, but she continues to supplement with formula until her milk comes in better..  Infant is stable. She finally did have a BM today.   She was discharged on post-partum day #4.    Post-partum hemoglobin:   Hemoglobin   Date Value Ref Range Status   08/30/2017 12.9 11.7 - 15.7 g/dL Final             Discharge Instructions and Follow-Up:   Discharge diet: Regular   Discharge activity: Lifting restricted to 20 pounds for the first 2 wks, then increase by 10 pounds every week thereafter.  No tub soaks for >15 minutes the first 2 wks then as desired.  No driving for 2 week(s).  No sex for 6 week(s).    Discharge follow-up: Follow up with primary care  provider in 1 wk for a BP recheck.   Wound care: Drink plenty of fluids  Keep wound clean and dry           Discharge Disposition:   Discharged to home      Attestation:  I have reviewed today's vital signs, notes, medications, labs and imaging.  Amount of time performed on this discharge summary: 20 minutes.    Electronically signed by:  Zechariah Mcdonald M.D.  9/2/2017

## 2017-09-02 NOTE — PROGRESS NOTES
Patients blood pressure has been elevated this shift. Now 181/101. Patient denies any complaints. States she had high blood pressure when she was heavier in weight and was on medication at that time. She states she could tell when it was high and that she would get headaches and feel general unwell. She appears a bit frustrated and states it doesn't make sense to her because she feels good. Denies headache. Is voiding adequately. Dr. Mcdonald notified of above. Will see patient this morning. No new orders at this time

## 2017-09-07 ENCOUNTER — PRENATAL OFFICE VISIT (OUTPATIENT)
Dept: FAMILY MEDICINE | Facility: CLINIC | Age: 32
End: 2017-09-07
Payer: COMMERCIAL

## 2017-09-07 VITALS
SYSTOLIC BLOOD PRESSURE: 178 MMHG | OXYGEN SATURATION: 95 % | TEMPERATURE: 97.1 F | BODY MASS INDEX: 40.77 KG/M2 | WEIGHT: 245 LBS | DIASTOLIC BLOOD PRESSURE: 102 MMHG | HEART RATE: 82 BPM | RESPIRATION RATE: 16 BRPM

## 2017-09-07 PROCEDURE — 99213 OFFICE O/P EST LOW 20 MIN: CPT | Performed by: FAMILY MEDICINE

## 2017-09-07 RX ORDER — LABETALOL 100 MG/1
50 TABLET, FILM COATED ORAL 2 TIMES DAILY
Qty: 60 TABLET | Refills: 1 | Status: SHIPPED | OUTPATIENT
Start: 2017-09-07 | End: 2017-09-07

## 2017-09-07 RX ORDER — LABETALOL 100 MG/1
50 TABLET, FILM COATED ORAL 2 TIMES DAILY
Qty: 60 TABLET | Refills: 1 | Status: SHIPPED | OUTPATIENT
Start: 2017-09-07 | End: 2017-09-12

## 2017-09-07 ASSESSMENT — PAIN SCALES - GENERAL: PAINLEVEL: NO PAIN (0)

## 2017-09-07 NOTE — NURSING NOTE
"Chief Complaint   Patient presents with     Hypertension     recheck       Initial Pulse 82  Temp 97.1  F (36.2  C) (Tympanic)  Resp 16  Wt 245 lb (111.1 kg)  LMP 12/06/2016 (Exact Date)  SpO2 95%  BMI 40.77 kg/m2 Estimated body mass index is 40.77 kg/(m^2) as calculated from the following:    Height as of 5/23/17: 5' 5\" (1.651 m).    Weight as of this encounter: 245 lb (111.1 kg).  Medication Reconciliation: complete    "

## 2017-09-07 NOTE — PROGRESS NOTES
"  SUBJECTIVE:   Shari Arshad is a 31 year old female who presents to clinic today for the following health issues:      Chief Complaint   Patient presents with     Hypertension     recheck     Her pressures at home have still been elevated.        PROBLEMS TO ADD ON...     No significant edema headaches or vision changes.     Problem list and histories reviewed & adjusted, as indicated.  Additional history: as documented    Patient Active Problem List   Diagnosis     Esophageal reflux     Lactose intolerances     Rh(-), needs rhogam at 28 wks gestation     Supervision of normal first pregnancy     Heartburn during pregnancy in third trimester     Elevated blood pressure affecting pregnancy in first trimester, antepartum     PIH (pregnancy induced hypertension)     S/P  section     Past Surgical History:   Procedure Laterality Date      SECTION N/A 2017    Procedure:  SECTION;   Section;  Surgeon: Zechariah Mcdonald MD;  Location: [facility] OR     HC EXCIS PRIMARY GANGLION WRIST  3/24/2004    Excision, volar wrist ganglion, left.     HC EXCIS RECURRENT GANGLION WRIST  2006    Recurrent volar wrist ganglion, left.     OPEN REDUCTION INTERNAL FIXATION WRIST      Cadavar bone       Social History   Substance Use Topics     Smoking status: Current Every Day Smoker     Packs/day: 0.50     Types: Cigarettes     Smokeless tobacco: Never Used      Comment: \"Trying to quit\"   Reports 4-6 cig/day     Alcohol use 0.0 oz/week     0 Standard drinks or equivalent per week      Comment: 2x a month not while pregnant     Family History   Problem Relation Age of Onset     Lipids Mother      Musculoskeletal Disorder Mother      ms     Allergies Mother      Depression Mother      Genitourinary Problems Mother      Respiratory Mother      CPAP for sleep apnea     Multiple Sclerosis Mother      Hypertension Father      Lipids Father      Hypertension Paternal Grandmother      Hypertension Paternal " Grandfather      CEREBROVASCULAR DISEASE Maternal Grandmother      CANCER Maternal Grandmother      ovarian     Gynecology Maternal Grandmother      ovarian ca     Asthma Sister      Psychotic Disorder Sister      depression (s/p an amputation at age 3)     Respiratory Sister      CPAP for sleep apnea     DIABETES Sister      adult-onset     DIABETES Maternal Uncle      type 2         Current Outpatient Prescriptions   Medication Sig Dispense Refill     labetalol (NORMODYNE) 100 MG tablet Take 0.5 tablets (50 mg) by mouth 2 times daily 60 tablet 1     oxyCODONE-acetaminophen (PERCOCET) 5-325 MG per tablet Take 1-2 tablets by mouth every 4 hours as needed for moderate to severe pain 40 tablet 0     ibuprofen (ADVIL/MOTRIN) 800 MG tablet Take 1 tablet (800 mg) by mouth every 6 hours as needed for moderate pain 90 tablet 1     senna-docusate (SENOKOT-S;PERICOLACE) 8.6-50 MG per tablet Take 1-2 tablets by mouth 2 times daily 100 tablet 1     omeprazole (PRILOSEC) 20 MG CR capsule Take 1 capsule (20 mg) by mouth daily 90 capsule 3     Prenatal Vit-Fe Fumarate-FA (PRENATAL MULTIVITAMIN  PLUS IRON) 27-0.8 MG TABS per tablet Take 1 tablet by mouth daily 100 tablet 3     [DISCONTINUED] labetalol (NORMODYNE) 100 MG tablet Take 0.5 tablets (50 mg) by mouth 2 times daily 60 tablet 1     Allergies   Allergen Reactions     Fruit Extracts Hives     Fruits from trees only if she touches them     Milk [Lac Bovis] GI Disturbance     No Known Drug Allergies      Recent Labs   Lab Test  08/29/17   1404  08/11/16   1438  02/12/13   1940   09/21/11   0845   LDL   --   97   --    --   120   HDL   --   69   --    --   58   TRIG   --   55   --    --   175*   ALT  22  28   --    --    --    CR  0.84  0.84  0.79   < >   --    GFRESTIMATED  78  78  87   < >   --    GFRESTBLACK  >90  >90  African American GFR Calc    >90   < >   --    POTASSIUM   --   3.8  3.7   --    --    TSH   --   0.85   --    --    --     < > = values in this interval not  displayed.      BP Readings from Last 3 Encounters:   09/07/17 (!) 178/102   09/02/17 (!) 169/94   08/29/17 (!) 166/122    Wt Readings from Last 3 Encounters:   09/07/17 245 lb (111.1 kg)   08/29/17 248 lb (112.5 kg)   08/23/17 247 lb (112 kg)                  Labs reviewed in EPIC          Reviewed and updated as needed this visit by clinical staffTobacco  Allergies  Meds       Reviewed and updated as needed this visit by Provider         ROS:  Constitutional, HEENT, cardiovascular, pulmonary, gi and gu systems are negative, except as otherwise noted.      OBJECTIVE:   BP (!) 178/102  Pulse 82  Temp 97.1  F (36.2  C) (Tympanic)  Resp 16  Wt 245 lb (111.1 kg)  LMP 12/06/2016 (Exact Date)  SpO2 95%  BMI 40.77 kg/m2  Body mass index is 40.77 kg/(m^2).  GENERAL: healthy, alert and no distress  NECK: no adenopathy, no asymmetry, masses, or scars and thyroid normal to palpation  RESP: lungs clear to auscultation - no rales, rhonchi or wheezes  CV: regular rate and rhythm, normal S1 S2, no S3 or S4, no murmur, click or rub, no peripheral edema and peripheral pulses strong  ABDOMEN: soft, nontender, without hepatosplenomegaly or masses, bowel sounds normal and well-healed incision the dermabond is starting to flake off.   MS: no gross musculoskeletal defects noted, no edema    Diagnostic Test Results:  none     ASSESSMENT/PLAN:     (O10.03) Benign essential hypertension, postpartum  (primary encounter diagnosis)  Comment: no new symptoms.   Plan:  labetalol (NORMODYNE) 100 MG         tablet        Will take 50 mg bid and return to clinic next week for a BP recheck.      FUTURE APPOINTMENTS:       - Follow-up visit in 1 wk.    Electronically signed by:  Zechariah Mcdonald M.D.  9/7/2017

## 2017-09-07 NOTE — MR AVS SNAPSHOT
After Visit Summary   9/7/2017    Shari Arshad    MRN: [id number]           Patient Information     Date Of Birth          1985        Visit Information        Provider Department      9/7/2017 10:50 AM Zechariah Mcdonald MD [facility]        Today's Diagnoses     Benign essential hypertension, postpartum    -  1       Follow-ups after your visit        Your next 10 appointments already scheduled     Sep 12, 2017 11:10 AM CDT   ESTABLISHED PRENATAL with Zechariah Mcdonald MD   [facility] ([facility])    [address] [place] [id number] [phone]              Who to contact     If you have questions or need follow up information about today's clinic visit or your schedule please contact [facility] directly at [phone].  Normal or non-critical lab and imaging results will be communicated to you by MyChart, letter or phone within 4 business days after the clinic has received the results. If you do not hear from us within 7 days, please contact the clinic through MyChart or phone. If you have a critical or abnormal lab result, we will notify you by phone as soon as possible.  Submit refill requests through [employer] or call your pharmacy and they will forward the refill request to us. Please allow 3 business days for your refill to be completed.          Additional Information About Your Visit        MyChart Information     [employer] gives you secure access to your electronic health record. If you see a primary care provider, you can also send messages to your care team and make appointments. If you have questions, please call your primary care clinic.  If you do not have a primary care provider, please call [phone] and they will assist you.        Care EveryWhere ID     This is your Care EveryWhere ID. This could be used by other organizations to access your [place] medical records  [id number]        Your  Vitals Were     Pulse Temperature Respirations Last Period Pulse Oximetry BMI (Body Mass Index)    82 97.1  F (36.2  C) (Tympanic) 16 12/06/2016 (Exact Date) 95% 40.77 kg/m2       Blood Pressure from Last 3 Encounters:   09/07/17 (!) 178/102   09/02/17 (!) 169/94   08/29/17 (!) 166/122    Weight from Last 3 Encounters:   09/07/17 245 lb (111.1 kg)   08/29/17 248 lb (112.5 kg)   08/23/17 247 lb (112 kg)              Today, you had the following     No orders found for display         Today's Medication Changes          These changes are accurate as of: 9/7/17 11:53 AM.  If you have any questions, ask your nurse or doctor.               Start taking these medicines.        Dose/Directions    labetalol 100 MG tablet   Commonly known as:  NORMODYNE   Used for:  Benign essential hypertension, postpartum   Started by:  Zechariah Mcdonald MD        Dose:  50 mg   Take 0.5 tablets (50 mg) by mouth 2 times daily   Quantity:  60 tablet   Refills:  1            Where to get your medicines      These medications were sent to [facility], [place] - [address]   [address] [facility] [address]     Phone:  [phone]     labetalol 100 MG tablet                Primary Care Provider Office Phone # Fax #    Zechariah Mcdonald [phone]       [address]   [facility] [address]        Equal Access to Services     DORA ARGUETA AH: Hadii clara ku hadasho Soomaali, waaxda luqadaha, qaybta kaalmada adeegyada, waxay emily penn. So [facility] [phone].    ATENCIÓN: Si habla español, tiene a meyers disposición servicios gratuitos de asistencia lingüística. Llame al [phone].    We comply with applicable federal civil rights laws and [place] laws. We do not discriminate on the basis of race, color, national origin, age, disability sex, sexual orientation or gender identity.            Thank you!     Thank you for choosing [facility]  for your  care. Our goal is always to provide you with excellent care. Hearing back from our patients is one way we can continue to improve our services. Please take a few minutes to complete the written survey that you may receive in the mail after your visit with us. Thank you!             Your Updated Medication List - Protect others around you: Learn how to safely use, store and throw away your medicines at www.disposemymeds.org.          This list is accurate as of: 17 11:53 AM.  Always use your most recent med list.                   Brand Name Dispense Instructions for use Diagnosis    ibuprofen 800 MG tablet    ADVIL/MOTRIN    90 tablet    Take 1 tablet (800 mg) by mouth every 6 hours as needed for moderate pain    S/P  section       labetalol 100 MG tablet    NORMODYNE    60 tablet    Take 0.5 tablets (50 mg) by mouth 2 times daily    Benign essential hypertension, postpartum       omeprazole 20 MG CR capsule    priLOSEC    90 capsule    Take 1 capsule (20 mg) by mouth daily    Heartburn during pregnancy in third trimester       oxyCODONE-acetaminophen 5-325 MG per tablet    PERCOCET    40 tablet    Take 1-2 tablets by mouth every 4 hours as needed for moderate to severe pain    S/P  section       prenatal multivitamin plus iron 27-0.8 MG Tabs per tablet     100 tablet    Take 1 tablet by mouth daily    Encounter for supervision of normal first pregnancy in first trimester       senna-docusate 8.6-50 MG per tablet    SENOKOT-S;PERICOLACE    100 tablet    Take 1-2 tablets by mouth 2 times daily    Constipation due to pain medication

## 2017-09-07 NOTE — PROGRESS NOTES
Shari Arshad  Gender: female  : 1985  [address] [place] [address]  [phone] (home)   Medical Record: [id number]  Primary Care Provider: Zechariah Mcdonald       [facility]   ?   Discharge Phone Call: Key Words/Key Times     How are you and the baby? Great, baby gaining weight    How are feedings going? Pumping/feeding/supplementing. Practicing latching    Voiding & Stooling? frequent    Any questions or concerns? Cord off - baby can have immersion bath now    Follow-up appointment? Next week, baby      We want to provide excellent care here at [facility]. Do you have any feedback for us that would help us improve?     Call back COMMENTS:         Attempted Calls:   _________     __________

## 2017-09-12 ENCOUNTER — PRENATAL OFFICE VISIT (OUTPATIENT)
Dept: FAMILY MEDICINE | Facility: CLINIC | Age: 32
End: 2017-09-12
Payer: COMMERCIAL

## 2017-09-12 VITALS
SYSTOLIC BLOOD PRESSURE: 178 MMHG | OXYGEN SATURATION: 95 % | HEART RATE: 72 BPM | RESPIRATION RATE: 16 BRPM | WEIGHT: 247 LBS | TEMPERATURE: 97 F | DIASTOLIC BLOOD PRESSURE: 106 MMHG | BODY MASS INDEX: 41.1 KG/M2

## 2017-09-12 DIAGNOSIS — Z23 NEED FOR PROPHYLACTIC VACCINATION AND INOCULATION AGAINST INFLUENZA: ICD-10-CM

## 2017-09-12 PROCEDURE — 90686 IIV4 VACC NO PRSV 0.5 ML IM: CPT | Performed by: FAMILY MEDICINE

## 2017-09-12 PROCEDURE — 99213 OFFICE O/P EST LOW 20 MIN: CPT | Mod: 24 | Performed by: FAMILY MEDICINE

## 2017-09-12 PROCEDURE — 90471 IMMUNIZATION ADMIN: CPT | Performed by: FAMILY MEDICINE

## 2017-09-12 RX ORDER — LABETALOL 100 MG/1
100 TABLET, FILM COATED ORAL 2 TIMES DAILY
Qty: 60 TABLET | Refills: 1
Start: 2017-09-12 | End: 2017-10-25

## 2017-09-12 ASSESSMENT — PAIN SCALES - GENERAL: PAINLEVEL: NO PAIN (0)

## 2017-09-12 NOTE — PROGRESS NOTES
Injectable Influenza Immunization Documentation    1.  Are you sick today? (Fever of 100.5 or higher on the day of the clinic)   No    2.  Have you ever had Guillain-Tustin Syndrome within 6 weeks of an influenza vaccionation?  No    3. Do you have a life-threatening allergy to eggs?  No    4. Do you have a life-threatening allergy to a component of the vaccine? May include antibiotics, gelatin or latex.  No     5. Have you ever had a reaction to a dose of flu vaccine that needed immediate medical attention?  No     Form completed by Katelynn James CMA (AAMA)

## 2017-09-12 NOTE — NURSING NOTE
"Chief Complaint   Patient presents with     Hypertension     recheck       Initial Pulse 72  Temp 97  F (36.1  C) (Tympanic)  Resp 16  Wt 247 lb (112 kg)  LMP 12/06/2016 (Exact Date)  SpO2 95%  BMI 41.1 kg/m2 Estimated body mass index is 41.1 kg/(m^2) as calculated from the following:    Height as of 5/23/17: 5' 5\" (1.651 m).    Weight as of this encounter: 247 lb (112 kg).  Medication Reconciliation: complete    "

## 2017-09-12 NOTE — PROGRESS NOTES
"  SUBJECTIVE:   Shari Arshad is a 31 year old female who presents to clinic today for the following health issues:      Chief Complaint   Patient presents with     Hypertension     recheck         PROBLEMS TO ADD ON...       She has been having dull headaches but no visual disturbances or other issues.      Problem list and histories reviewed & adjusted, as indicated.  Additional history: as documented    Patient Active Problem List   Diagnosis     Esophageal reflux     Lactose intolerances     Rh(-), needs rhogam at 28 wks gestation     Supervision of normal first pregnancy     Heartburn during pregnancy in third trimester     Elevated blood pressure affecting pregnancy in first trimester, antepartum     PIH (pregnancy induced hypertension)     S/P  section     Benign essential hypertension, postpartum     Past Surgical History:   Procedure Laterality Date      SECTION N/A 2017    Procedure:  SECTION;   Section;  Surgeon: Zechariah Mcdonald MD;  Location: [facility]     HC EXCIS PRIMARY GANGLION WRIST  3/24/2004    Excision, volar wrist ganglion, left.     HC EXCIS RECURRENT GANGLION WRIST  2006    Recurrent volar wrist ganglion, left.     OPEN REDUCTION INTERNAL FIXATION WRIST      Cadavar bone       Social History   Substance Use Topics     Smoking status: Current Every Day Smoker     Packs/day: 0.50     Types: Cigarettes     Smokeless tobacco: Never Used      Comment: \"Trying to quit\"   Reports 4-6 cig/day     Alcohol use 0.0 oz/week     0 Standard drinks or equivalent per week      Comment: 2x a month not while pregnant     Family History   Problem Relation Age of Onset     Lipids Mother      Musculoskeletal Disorder Mother      ms     Allergies Mother      Depression Mother      Genitourinary Problems Mother      Respiratory Mother      CPAP for sleep apnea     Multiple Sclerosis Mother      Hypertension Father      Lipids Father      Hypertension Paternal Grandmother "      Hypertension Paternal Grandfather      CEREBROVASCULAR DISEASE Maternal Grandmother      CANCER Maternal Grandmother      ovarian     Gynecology Maternal Grandmother      ovarian ca     Asthma Sister      Psychotic Disorder Sister      depression (s/p an amputation at age 3)     Respiratory Sister      CPAP for sleep apnea     DIABETES Sister      adult-onset     DIABETES Maternal Uncle      type 2         Current Outpatient Prescriptions   Medication Sig Dispense Refill     labetalol (NORMODYNE) 100 MG tablet Take 1 tablet (100 mg) by mouth 2 times daily 60 tablet 1     ibuprofen (ADVIL/MOTRIN) 800 MG tablet Take 1 tablet (800 mg) by mouth every 6 hours as needed for moderate pain 90 tablet 1     senna-docusate (SENOKOT-S;PERICOLACE) 8.6-50 MG per tablet Take 1-2 tablets by mouth 2 times daily 100 tablet 1     omeprazole (PRILOSEC) 20 MG CR capsule Take 1 capsule (20 mg) by mouth daily 90 capsule 3     Prenatal Vit-Fe Fumarate-FA (PRENATAL MULTIVITAMIN  PLUS IRON) 27-0.8 MG TABS per tablet Take 1 tablet by mouth daily 100 tablet 3     Allergies   Allergen Reactions     Fruit Extracts Hives     Fruits from trees only if she touches them     Milk [Lac Bovis] GI Disturbance     No Known Drug Allergies      BP Readings from Last 3 Encounters:   09/12/17 (!) 178/106   09/07/17 (!) 178/102   09/02/17 (!) 169/94    Wt Readings from Last 3 Encounters:   09/12/17 247 lb (112 kg)   09/07/17 245 lb (111.1 kg)   08/29/17 248 lb (112.5 kg)                        Reviewed and updated as needed this visit by clinical staffTobacco  Allergies  Meds  Problems       Reviewed and updated as needed this visit by Provider         ROS:  Constitutional, HEENT, cardiovascular, pulmonary, gi and gu systems are negative, except as otherwise noted.      OBJECTIVE:   BP (!) 178/106  Pulse 72  Temp 97  F (36.1  C) (Tympanic)  Resp 16  Wt 247 lb (112 kg)  LMP 12/06/2016 (Exact Date)  SpO2 95%  BMI 41.1 kg/m2  Body mass index is  "41.1 kg/(m^2).  GENERAL: healthy, alert and no distress  NECK: no adenopathy, no asymmetry, masses, or scars and thyroid normal to palpation  RESP: lungs clear to auscultation - no rales, rhonchi or wheezes  CV: regular rate and rhythm, normal S1 S2, no S3 or S4, no murmur, click or rub, no peripheral edema and peripheral pulses strong  MS: no gross musculoskeletal defects noted, no edema    Diagnostic Test Results:  none     ASSESSMENT/PLAN:   (O10.03) Benign essential hypertension, postpartum  Comment: her BP has actually gone up since discharge and she is having some headaches.    Plan: labetalol (NORMODYNE) 100 MG tablet        She needs to start on medication and will require close follow up to see if she normalizes her BP by her 6-8 wk postpartum visit.  If not then we can classify her as a chronic hypertensive and keep her on medication.  If her BP starts dropping and we are able to get her off of the medication, then it is most likely pregnancy associated only.      (Z23) Need for prophylactic vaccination and inoculation against influenza  Comment: she is requesting her flu vaccine today  Plan: FLU VAC, SPLIT VIRUS IM > 3 YO (QUADRIVALENT)         [41459], Vaccine Administration, Initial         [82529]        Given.      Tobacco Cessation:   reports that she has been smoking Cigarettes.  She has been smoking about 0.50 packs per day. She has never used smokeless tobacco.    BMI:   Estimated body mass index is 41.1 kg/(m^2) as calculated from the following:    Height as of 5/23/17: 5' 5\" (1.651 m).    Weight as of this encounter: 247 lb (112 kg).   Weight management plan: Discussed healthy diet and exercise guidelines and patient will follow up in 1 month in clinic to re-evaluate.    Electronically signed by:  Zechariah Mcdonald M.D.  9/12/2017    "

## 2017-09-12 NOTE — NURSING NOTE
Prior to injection verified patient identity using patient's name and date of birth.  Per orders of Dr. Mcdonald, injection of influenza given by Katelynn James. Patient instructed to remain in clinic for 15 minutes afterwards, and to report any adverse reaction to me immediately.

## 2017-09-12 NOTE — MR AVS SNAPSHOT
After Visit Summary   9/12/2017    Shari Arshad    MRN: [id number]           Patient Information     Date Of Birth          1985        Visit Information        Provider Department      9/12/2017 11:10 AM Zechariah Mcdonald MD [facility]        Today's Diagnoses     Need for prophylactic vaccination and inoculation against influenza    -  1    Benign essential hypertension, postpartum           Follow-ups after your visit        Your next 10 appointments already scheduled     Nov 13, 2017  2:40 PM CST   ESTABLISHED PRENATAL with Zechariah Mcdonald MD   [facility]    [address] [place] [id number] [phone]              Who to contact     If you have questions or need follow up information about today's clinic visit or your schedule please contact [facility] directly at [phone].  Normal or non-critical lab and imaging results will be communicated to you by Bundlehart, letter or phone within 4 business days after the clinic has received the results. If you do not hear from us within 7 days, please contact the clinic through Bundlehart or phone. If you have a critical or abnormal lab result, we will notify you by phone as soon as possible.  Submit refill requests through Wanderable or call your pharmacy and they will forward the refill request to us. Please allow 3 business days for your refill to be completed.          Additional Information About Your Visit        MyChart Information     Wanderable gives you secure access to your electronic health record. If you see a primary care provider, you can also send messages to your care team and make appointments. If you have questions, please call your primary care clinic.  If you do not have a primary care provider, please call [phone] and they will assist you.        Care EveryWhere ID     This is your Care EveryWhere ID. This could be used by other  organizations to access your [facility] medical records  [id number]        Your Vitals Were     Pulse Temperature Respirations Last Period Pulse Oximetry BMI (Body Mass Index)    72 97  F (36.1  C) (Tympanic) 16 12/06/2016 (Exact Date) 95% 41.1 kg/m2       Blood Pressure from Last 3 Encounters:   09/12/17 (!) 178/106   09/07/17 (!) 178/102   09/02/17 (!) 169/94    Weight from Last 3 Encounters:   09/12/17 247 lb (112 kg)   09/07/17 245 lb (111.1 kg)   08/29/17 248 lb (112.5 kg)              We Performed the Following     FLU VAC, SPLIT VIRUS IM > 3 YO (QUADRIVALENT) [96742]     Vaccine Administration, Initial [96635]          Today's Medication Changes          These changes are accurate as of: 9/12/17 12:02 PM.  If you have any questions, ask your nurse or doctor.               These medicines have changed or have updated prescriptions.        Dose/Directions    labetalol 100 MG tablet   Commonly known as:  NORMODYNE   This may have changed:  how much to take   Used for:  Benign essential hypertension, postpartum   Changed by:  Zechariah Mcdonald MD        Dose:  100 mg   Take 1 tablet (100 mg) by mouth 2 times daily   Quantity:  60 tablet   Refills:  1            Where to get your medicines      Some of these will need a paper prescription and others can be bought over the counter.  Ask your nurse if you have questions.     You don't need a prescription for these medications     labetalol 100 MG tablet                Primary Care Provider Office Phone # Fax #    Zechariah Mcdonald [phone]       [address] [place] [address]        Equal Access to Services     [facility] AH: Hadii clara carbone Soapollo, waaxda luqadaha, qaybta kaalmada savita, ashley penn. So [facility] [phone].    ATENCIÓN: Si habla español, tiene a meyers disposición servicios gratuitos de asistencia lingüística. Llame al [phone].    We comply with applicable federal civil  rights laws and [place] laws. We do not discriminate on the basis of race, color, national origin, age, disability sex, sexual orientation or gender identity.            Thank you!     Thank you for choosing [facility]  for your care. Our goal is always to provide you with excellent care. Hearing back from our patients is one way we can continue to improve our services. Please take a few minutes to complete the written survey that you may receive in the mail after your visit with us. Thank you!             Your Updated Medication List - Protect others around you: Learn how to safely use, store and throw away your medicines at www.disposemymeds.org.          This list is accurate as of: 17 12:02 PM.  Always use your most recent med list.                   Brand Name Dispense Instructions for use Diagnosis    ibuprofen 800 MG tablet    ADVIL/MOTRIN    90 tablet    Take 1 tablet (800 mg) by mouth every 6 hours as needed for moderate pain    S/P  section       labetalol 100 MG tablet    NORMODYNE    60 tablet    Take 1 tablet (100 mg) by mouth 2 times daily    Benign essential hypertension, postpartum       omeprazole 20 MG CR capsule    priLOSEC    90 capsule    Take 1 capsule (20 mg) by mouth daily    Heartburn during pregnancy in third trimester       prenatal multivitamin plus iron 27-0.8 MG Tabs per tablet     100 tablet    Take 1 tablet by mouth daily    Encounter for supervision of normal first pregnancy in first trimester       senna-docusate 8.6-50 MG per tablet    SENOKOT-S;PERICOLACE    100 tablet    Take 1-2 tablets by mouth 2 times daily    Constipation due to pain medication

## 2017-09-14 PROBLEM — Z29.89 NEED FOR PROPHYLACTIC IMMUNOTHERAPY: Status: RESOLVED | Noted: 2017-01-09 | Resolved: 2017-09-14

## 2017-09-14 PROBLEM — Z34.00 SUPERVISION OF NORMAL FIRST PREGNANCY: Status: RESOLVED | Noted: 2017-02-20 | Resolved: 2017-09-14

## 2017-09-14 PROBLEM — Z98.891 S/P CESAREAN SECTION: Status: RESOLVED | Noted: 2017-08-29 | Resolved: 2017-09-14

## 2017-09-14 PROBLEM — O26.893 HEARTBURN DURING PREGNANCY IN THIRD TRIMESTER: Status: RESOLVED | Noted: 2017-06-20 | Resolved: 2017-09-14

## 2017-09-14 PROBLEM — R12 HEARTBURN DURING PREGNANCY IN THIRD TRIMESTER: Status: RESOLVED | Noted: 2017-06-20 | Resolved: 2017-09-14

## 2017-10-25 ENCOUNTER — TELEPHONE (OUTPATIENT)
Dept: FAMILY MEDICINE | Facility: CLINIC | Age: 32
End: 2017-10-25

## 2017-10-25 RX ORDER — LABETALOL 200 MG/1
200 TABLET, FILM COATED ORAL 2 TIMES DAILY
Qty: 60 TABLET | Refills: 1 | Status: SHIPPED | OUTPATIENT
Start: 2017-10-25 | End: 2018-01-03

## 2017-10-25 NOTE — TELEPHONE ENCOUNTER
Patient was in clinic today and we did recheck her B/P  And it was 156/102. Rechecked it about 20 minutes later and it was 150/100.   is going to look over her medication and send her a new B/P medication.  Patient would like it sent to [facility] in [place].  MP/MA

## 2017-10-25 NOTE — TELEPHONE ENCOUNTER
We need to double her dose from 100 mg bid to 200 mg bid.  I sent a new Rx to the pharmacy for her.  She can just take 2 of the 100 mg tabs until they are gone.  I sent a new script for the 200 mg tabs so she will only need to take 1 of those twice a day.      Electronically signed by:  Zechariah Mcdonald M.D.  10/25/2017

## 2017-11-13 ENCOUNTER — PRENATAL OFFICE VISIT (OUTPATIENT)
Dept: FAMILY MEDICINE | Facility: CLINIC | Age: 32
End: 2017-11-13
Payer: COMMERCIAL

## 2017-11-13 VITALS
SYSTOLIC BLOOD PRESSURE: 144 MMHG | HEART RATE: 71 BPM | WEIGHT: 247 LBS | BODY MASS INDEX: 41.1 KG/M2 | DIASTOLIC BLOOD PRESSURE: 98 MMHG | TEMPERATURE: 97.7 F | RESPIRATION RATE: 16 BRPM | OXYGEN SATURATION: 98 %

## 2017-11-13 DIAGNOSIS — I10 BENIGN ESSENTIAL HYPERTENSION: ICD-10-CM

## 2017-11-13 PROCEDURE — 99207 ZZC POST PARTUM EXAM: CPT | Performed by: FAMILY MEDICINE

## 2017-11-13 RX ORDER — LISINOPRIL 10 MG/1
10 TABLET ORAL DAILY
Qty: 90 TABLET | Refills: 3 | Status: SHIPPED | OUTPATIENT
Start: 2017-11-13 | End: 2018-01-03

## 2017-11-13 ASSESSMENT — PAIN SCALES - GENERAL: PAINLEVEL: NO PAIN (0)

## 2017-11-13 NOTE — NURSING NOTE
"Chief Complaint   Patient presents with     Post Partum Exam     Hypertension       Initial BP (!) 144/98  Pulse 71  Temp 97.7  F (36.5  C) (Tympanic)  Resp 16  Wt 247 lb (112 kg)  SpO2 98%  BMI 41.1 kg/m2 Estimated body mass index is 41.1 kg/(m^2) as calculated from the following:    Height as of 5/23/17: 5' 5\" (1.651 m).    Weight as of this encounter: 247 lb (112 kg).  Medication Reconciliation: complete    "

## 2017-11-13 NOTE — PROGRESS NOTES
Shari is here for a 6-week postpartum checkup.    She had a c/s of a viable girl, weight 5 pounds 4 oz., with no complications. Date of delivery was 2017. Since delivery, she has been breast feeding.  She has no signs of infection, bleeding or other complications.  She is not pregnant.  We discussed contraceptions and she has chosen vasectomy.      Post partum tubal: No  Type of Delivery:    Feeding Method:  Pumping  If initiated breast feeding and stopped, how long did you breast feed?:  Pumping and doing great.      REVIEW OF SYSTEMS:  Ears/Nose/Throat: negative  Respiratory: negative  Cardiovascular: negative  Gastrointestinal: negative  Genitourinary: negative  Musculoskeletal: negative    Neurologic: negative   Skin: negative   Endocrine:  negative  Vagina: negative  Cervix: negative  Breasts: negative  Vulva: negative  Episiotomy: NA  Contraception Plan:  is seeing me this week to discuss a vasectomy  Medical History Reviewed yes - updated   Family History Reviewed yes - updated   Problem List Updated yes - updated     EXAM:    HEENT: grossly normal.  NECK: no lymphadenopathy or thyroidomegaly.  LUNGS: CTA X 2, no rales or crackles.  BACK: No spinal or CVA tenderness.  HEART: RRR without murmurs clicks or gallops.  ABDOMEN: soft, non tender, good bowel sounds, without masses   rebound, guarding or tenderness.  PELVIC: not indicated.    EXTREMITIES:  warm to touch, good pulses, no ankle edema or calf tenderness.  NEUROLOGIC: grossly normal.    ASSESSMENT:   6-week postpartum exam after c/s for breech or transverse.  Hypertension    PLAN:    Will add a second agent for her BP  Discussed calcium intake, vitamins and supplements  Exercise encouraged  Follow up in 2 months for a BP recheck.  Her  is planning a vasectomy for contraception.    Electronically signed by:  Zechariah Mcdonald M.D.  2017

## 2017-11-13 NOTE — MR AVS SNAPSHOT
After Visit Summary   11/13/2017    Shari Arshad    MRN: [id number]           Patient Information     Date Of Birth          1985        Visit Information        Provider Department      11/13/2017 2:40 PM Zechariah Mcdonald MD [facility]        Today's Diagnoses     Benign essential hypertension    -  1       Follow-ups after your visit        Who to contact     If you have questions or need follow up information about today's clinic visit or your schedule please contact [facility] directly at [phone].  Normal or non-critical lab and imaging results will be communicated to you by MyChart, letter or phone within 4 business days after the clinic has received the results. If you do not hear from us within 7 days, please contact the clinic through Hipbonet or phone. If you have a critical or abnormal lab result, we will notify you by phone as soon as possible.  Submit refill requests through PlayFirst or call your pharmacy and they will forward the refill request to us. Please allow 3 business days for your refill to be completed.          Additional Information About Your Visit        MyChart Information     PlayFirst gives you secure access to your electronic health record. If you see a primary care provider, you can also send messages to your care team and make appointments. If you have questions, please call your primary care clinic.  If you do not have a primary care provider, please call [phone] and they will assist you.        Care EveryWhere ID     This is your Care EveryWhere ID. This could be used by other organizations to access your [facility] medical records  [id number]        Your Vitals Were     Pulse Temperature Respirations Pulse Oximetry BMI (Body Mass Index)       71 97.7  F (36.5  C) (Tympanic) 16 98% 41.1 kg/m2        Blood Pressure from Last 3 Encounters:   11/13/17 (!) 144/98   09/12/17 (!) 178/106   09/07/17 (!) 178/102    Weight from  Last 3 Encounters:   11/13/17 247 lb (112 kg)   09/12/17 247 lb (112 kg)   09/07/17 245 lb (111.1 kg)              Today, you had the following     No orders found for display         Today's Medication Changes          These changes are accurate as of: 11/13/17  3:39 PM.  If you have any questions, ask your nurse or doctor.               Start taking these medicines.        Dose/Directions    lisinopril 10 MG tablet   Commonly known as:  PRINIVIL/ZESTRIL   Used for:  Benign essential hypertension   Started by:  Zechariah Mcdonald MD        Dose:  10 mg   Take 1 tablet (10 mg) by mouth daily   Quantity:  90 tablet   Refills:  3            Where to get your medicines      These medications were sent to [id number] - [place], [place] - [address]  [address] [place] [address]     Phone:  [phone]     lisinopril 10 MG tablet                Primary Care Provider Office Phone # Fax #    Zechariah Mcdonald [phone]       [address] [place] [address]        Equal Access to Services     [facility]: Hadii aad ku hadasho Soomaali, waaxda luqadaha, qaybta kaalmada adeegyada, ashley gore . So [facility] [phone].    ATENCIÓN: Si habla español, tiene a meyers disposición servicios gratuitos de asistencia lingüística. Ll[facility] [phone].    We comply with applicable federal civil rights laws and [place] laws. We do not discriminate on the basis of race, color, national origin, age, disability, sex, sexual orientation, or gender identity.            Thank you!     Thank you for choosing [facility]  for your care. Our goal is always to provide you with excellent care. Hearing back from our patients is one way we can continue to improve our services. Please take a few minutes to complete the written survey that you may receive in the mail after your visit with us. Thank you!             Your Updated Medication List - Protect others around you:  Learn how to safely use, store and throw away your medicines at www.disposemymeds.org.          This list is accurate as of: 17  3:39 PM.  Always use your most recent med list.                   Brand Name Dispense Instructions for use Diagnosis    ibuprofen 800 MG tablet    ADVIL/MOTRIN    90 tablet    Take 1 tablet (800 mg) by mouth every 6 hours as needed for moderate pain    S/P  section       labetalol 200 MG tablet    NORMODYNE    60 tablet    Take 1 tablet (200 mg) by mouth 2 times daily    Benign essential hypertension, postpartum       lisinopril 10 MG tablet    PRINIVIL/ZESTRIL    90 tablet    Take 1 tablet (10 mg) by mouth daily    Benign essential hypertension       omeprazole 20 MG CR capsule    priLOSEC    90 capsule    Take 1 capsule (20 mg) by mouth daily    Heartburn during pregnancy in third trimester       prenatal multivitamin plus iron 27-0.8 MG Tabs per tablet     100 tablet    Take 1 tablet by mouth daily    Encounter for supervision of normal first pregnancy in first trimester       senna-docusate 8.6-50 MG per tablet    SENOKOT-S;PERICOLACE    100 tablet    Take 1-2 tablets by mouth 2 times daily    Constipation due to pain medication

## 2018-01-03 ENCOUNTER — OFFICE VISIT (OUTPATIENT)
Dept: FAMILY MEDICINE | Facility: CLINIC | Age: 33
End: 2018-01-03
Payer: COMMERCIAL

## 2018-01-03 VITALS
TEMPERATURE: 97 F | RESPIRATION RATE: 12 BRPM | BODY MASS INDEX: 40.32 KG/M2 | WEIGHT: 242 LBS | DIASTOLIC BLOOD PRESSURE: 100 MMHG | OXYGEN SATURATION: 96 % | HEART RATE: 87 BPM | HEIGHT: 65 IN | SYSTOLIC BLOOD PRESSURE: 154 MMHG

## 2018-01-03 DIAGNOSIS — I10 BENIGN ESSENTIAL HYPERTENSION: ICD-10-CM

## 2018-01-03 PROBLEM — O13.9 PIH (PREGNANCY INDUCED HYPERTENSION): Status: RESOLVED | Noted: 2017-08-29 | Resolved: 2018-01-03

## 2018-01-03 PROBLEM — O16.1 ELEVATED BLOOD PRESSURE AFFECTING PREGNANCY IN FIRST TRIMESTER, ANTEPARTUM: Status: RESOLVED | Noted: 2017-08-29 | Resolved: 2018-01-03

## 2018-01-03 LAB
ANION GAP SERPL CALCULATED.3IONS-SCNC: 6 MMOL/L (ref 3–14)
BUN SERPL-MCNC: 14 MG/DL (ref 7–30)
CALCIUM SERPL-MCNC: 9.2 MG/DL (ref 8.5–10.1)
CHLORIDE SERPL-SCNC: 104 MMOL/L (ref 94–109)
CO2 SERPL-SCNC: 29 MMOL/L (ref 20–32)
CREAT SERPL-MCNC: 0.76 MG/DL (ref 0.52–1.04)
CREAT UR-MCNC: 118 MG/DL
GFR SERPL CREATININE-BSD FRML MDRD: 87 ML/MIN/1.7M2
GLUCOSE SERPL-MCNC: 79 MG/DL (ref 70–99)
MICROALBUMIN UR-MCNC: 17 MG/L
MICROALBUMIN/CREAT UR: 14.32 MG/G CR (ref 0–25)
POTASSIUM SERPL-SCNC: 4 MMOL/L (ref 3.4–5.3)
SODIUM SERPL-SCNC: 139 MMOL/L (ref 133–144)

## 2018-01-03 PROCEDURE — 36415 COLL VENOUS BLD VENIPUNCTURE: CPT | Performed by: FAMILY MEDICINE

## 2018-01-03 PROCEDURE — 82043 UR ALBUMIN QUANTITATIVE: CPT | Performed by: FAMILY MEDICINE

## 2018-01-03 PROCEDURE — 80048 BASIC METABOLIC PNL TOTAL CA: CPT | Performed by: FAMILY MEDICINE

## 2018-01-03 PROCEDURE — 99213 OFFICE O/P EST LOW 20 MIN: CPT | Performed by: FAMILY MEDICINE

## 2018-01-03 RX ORDER — LISINOPRIL 20 MG/1
20 TABLET ORAL DAILY
Qty: 90 TABLET | Refills: 1 | Status: SHIPPED | OUTPATIENT
Start: 2018-01-03 | End: 2018-09-05

## 2018-01-03 ASSESSMENT — PAIN SCALES - GENERAL: PAINLEVEL: NO PAIN (0)

## 2018-01-03 NOTE — NURSING NOTE
"Chief Complaint   Patient presents with     Hypertension     recheck       Initial BP (!) 154/104  Pulse 87  Temp 97  F (36.1  C) (Tympanic)  Resp 12  Ht 5' 5\" (1.651 m)  Wt 242 lb (109.8 kg)  SpO2 96%  BMI 40.27 kg/m2 Estimated body mass index is 40.27 kg/(m^2) as calculated from the following:    Height as of this encounter: 5' 5\" (1.651 m).    Weight as of this encounter: 242 lb (109.8 kg).  Medication Reconciliation: complete    "

## 2018-01-03 NOTE — PROGRESS NOTES
"  SUBJECTIVE:   Shari Arshad is a 32 year old female who presents to clinic today for the following health issues:      Hypertension Follow-up      Outpatient blood pressures are not being checked.    Low Salt Diet: not monitoring salt        Amount of exercise or physical activity: None    Problems taking medications regularly: No    Medication side effects: none    Diet: regular (no restrictions)        PROBLEMS TO ADD ON...  Stopped taking her labetalol since she wasn't feeling well on it.  No other problems, very busy at work, no HAs, visual disturbances, or peripheral edema.      Problem list and histories reviewed & adjusted, as indicated.  Additional history: as documented    Patient Active Problem List   Diagnosis     Esophageal reflux     Lactose intolerances     Benign essential hypertension, postpartum     Past Surgical History:   Procedure Laterality Date      SECTION N/A 2017    Procedure:  SECTION;   Section;  Surgeon: Zechariah Mcdonald MD;  Location: [facility]     HC EXCIS PRIMARY GANGLION WRIST  3/24/2004    Excision, volar wrist ganglion, left.     HC EXCIS RECURRENT GANGLION WRIST  2006    Recurrent volar wrist ganglion, left.     OPEN REDUCTION INTERNAL FIXATION WRIST      Cadavar bone       Social History   Substance Use Topics     Smoking status: Current Every Day Smoker     Packs/day: 0.50     Types: Cigarettes     Smokeless tobacco: Never Used      Comment: \"Trying to quit\"   Reports 4-6 cig/day     Alcohol use 0.0 oz/week     0 Standard drinks or equivalent per week      Comment: 2x a month not while pregnant     Family History   Problem Relation Age of Onset     Lipids Mother      Musculoskeletal Disorder Mother      ms     Allergies Mother      Depression Mother      Genitourinary Problems Mother      Respiratory Mother      CPAP for sleep apnea     Multiple Sclerosis Mother      Hypertension Father      Lipids Father      Hypertension Paternal " Grandmother      Hypertension Paternal Grandfather      CEREBROVASCULAR DISEASE Maternal Grandmother      CANCER Maternal Grandmother      ovarian     Gynecology Maternal Grandmother      ovarian ca     Asthma Sister      Psychotic Disorder Sister      depression (s/p an amputation at age 3)     Respiratory Sister      CPAP for sleep apnea     DIABETES Sister      adult-onset     DIABETES Maternal Uncle      type 2         Current Outpatient Prescriptions   Medication Sig Dispense Refill     lisinopril (PRINIVIL/ZESTRIL) 20 MG tablet Take 1 tablet (20 mg) by mouth daily 90 tablet 1     ibuprofen (ADVIL/MOTRIN) 800 MG tablet Take 1 tablet (800 mg) by mouth every 6 hours as needed for moderate pain 90 tablet 1     senna-docusate (SENOKOT-S;PERICOLACE) 8.6-50 MG per tablet Take 1-2 tablets by mouth 2 times daily 100 tablet 1     omeprazole (PRILOSEC) 20 MG CR capsule Take 1 capsule (20 mg) by mouth daily 90 capsule 3     Prenatal Vit-Fe Fumarate-FA (PRENATAL MULTIVITAMIN  PLUS IRON) 27-0.8 MG TABS per tablet Take 1 tablet by mouth daily 100 tablet 3     [DISCONTINUED] lisinopril (PRINIVIL/ZESTRIL) 10 MG tablet Take 1 tablet (10 mg) by mouth daily 90 tablet 3     Allergies   Allergen Reactions     Fruit Extracts Hives     Fruits from trees only if she touches them     Milk [Lac Bovis] GI Disturbance     No Known Drug Allergies      BP Readings from Last 3 Encounters:   01/03/18 (!) 154/100   11/13/17 (!) 144/98   09/12/17 (!) 178/106    Wt Readings from Last 3 Encounters:   01/03/18 242 lb (109.8 kg)   11/13/17 247 lb (112 kg)   09/12/17 247 lb (112 kg)                  Labs reviewed in EPIC        Reviewed and updated as needed this visit by clinical staffTobacco  Allergies  Meds       Reviewed and updated as needed this visit by Provider         ROS:  Constitutional, HEENT, cardiovascular, pulmonary, gi and gu systems are negative, except as otherwise noted.      OBJECTIVE:   BP (!) 154/100  Pulse 87  Temp 97  F  "(36.1  C) (Tympanic)  Resp 12  Ht 5' 5\" (1.651 m)  Wt 242 lb (109.8 kg)  SpO2 96%  BMI 40.27 kg/m2  Body mass index is 40.27 kg/(m^2).  GENERAL: healthy, alert and no distress  NECK: no adenopathy, no asymmetry, masses, or scars and thyroid normal to palpation  RESP: lungs clear to auscultation - no rales, rhonchi or wheezes  CV: regular rate and rhythm, normal S1 S2, no S3 or S4, no murmur, click or rub, no peripheral edema and peripheral pulses strong  MS: no gross musculoskeletal defects noted, no edema    Diagnostic Test Results:  Results for orders placed or performed in visit on 01/03/18 (from the past 24 hour(s))   Basic metabolic panel   Result Value Ref Range    Sodium 139 133 - 144 mmol/L    Potassium 4.0 3.4 - 5.3 mmol/L    Chloride 104 94 - 109 mmol/L    Carbon Dioxide 29 20 - 32 mmol/L    Anion Gap 6 3 - 14 mmol/L    Glucose 79 70 - 99 mg/dL    Urea Nitrogen 14 7 - 30 mg/dL    Creatinine 0.76 0.52 - 1.04 mg/dL    GFR Estimate 87 >60 mL/min/1.7m2    GFR Estimate If Black >90 >60 mL/min/1.7m2    Calcium 9.2 8.5 - 10.1 mg/dL   Albumin Random Urine Quantitative with Creat Ratio   Result Value Ref Range    Creatinine Urine 118 mg/dL    Albumin Urine mg/L 17 mg/L    Albumin Urine mg/g Cr 14.32 0 - 25 mg/g Cr       ASSESSMENT/PLAN:   (I10) Benign essential hypertension  Comment: her BP is still elevated 4 months out from delivery.   Plan: lisinopril (PRINIVIL/ZESTRIL) 20 MG tablet,         Basic metabolic panel, Albumin Random Urine         Quantitative with Creat Ratio        Will increase her lisinopril to 20 mg since she tolerates this medication and keep her off of the labetalol.  If needed we can also add HCTZ to this but will recheck BP in 2 months or sooner if needed.      Tobacco Cessation:   reports that she has been smoking Cigarettes.  She has been smoking about 0.50 packs per day. She has never used smokeless tobacco.      BMI:   Estimated body mass index is 40.27 kg/(m^2) as calculated from " "the following:    Height as of this encounter: 5' 5\" (1.651 m).    Weight as of this encounter: 242 lb (109.8 kg).   Weight management plan: Discussed healthy diet and exercise guidelines and patient will follow up in 2 months in clinic to re-evaluate.     Electronically signed by:  Zechariah Mcdonald M.D.  1/3/2018    "

## 2018-01-03 NOTE — MR AVS SNAPSHOT
After Visit Summary   1/3/2018    Shari Arshad    MRN: [id number]           Patient Information     Date Of Birth          1985        Visit Information        Provider Department      1/3/2018 3:20 PM Zechariah Mcdonald MD [facility]        Today's Diagnoses     Benign essential hypertension           Follow-ups after your visit        Your next 10 appointments already scheduled     Mar 07, 2018  3:00 PM CST   Office Visit with Zechariah Mcdonald MD   [facility]    [address] [place] [id number] [phone]           Bring a current list of meds and any records pertaining to this visit. For Physicals, please bring immunization records and any forms needing to be filled out. Please arrive 10 minutes early to complete paperwork.              Who to contact     If you have questions or need follow up information about today's clinic visit or your schedule please contact [facility] directly at [phone].  Normal or non-critical lab and imaging results will be communicated to you by Cornicehart, letter or phone within 4 business days after the clinic has received the results. If you do not hear from us within 7 days, please contact the clinic through MadRat Gamest or phone. If you have a critical or abnormal lab result, we will notify you by phone as soon as possible.  Submit refill requests through PlayData or call your pharmacy and they will forward the refill request to us. Please allow 3 business days for your refill to be completed.          Additional Information About Your Visit        Cornicehart Information     PlayData gives you secure access to your electronic health record. If you see a primary care provider, you can also send messages to your care team and make appointments. If you have questions, please call your primary care clinic.  If you do not have a primary care provider, please call [phone]  "and they will assist you.        Care EveryWhere ID     This is your Care EveryWhere ID. This could be used by other organizations to access your [place] medical records  [id number]        Your Vitals Were     Pulse Temperature Respirations Height Pulse Oximetry BMI (Body Mass Index)    87 97  F (36.1  C) (Tympanic) 12 5' 5\" (1.651 m) 96% 40.27 kg/m2       Blood Pressure from Last 3 Encounters:   01/03/18 (!) 154/100   11/13/17 (!) 144/98   09/12/17 (!) 178/106    Weight from Last 3 Encounters:   01/03/18 242 lb (109.8 kg)   11/13/17 247 lb (112 kg)   09/12/17 247 lb (112 kg)              We Performed the Following     Albumin Random Urine Quantitative with Creat Ratio     Basic metabolic panel          Today's Medication Changes          These changes are accurate as of: 1/3/18  4:12 PM.  If you have any questions, ask your nurse or doctor.               These medicines have changed or have updated prescriptions.        Dose/Directions    lisinopril 20 MG tablet   Commonly known as:  PRINIVIL/ZESTRIL   This may have changed:    - medication strength  - how much to take   Used for:  Benign essential hypertension   Changed by:  Zechariah Mcdonald MD        Dose:  20 mg   Take 1 tablet (20 mg) by mouth daily   Quantity:  90 tablet   Refills:  1            Where to get your medicines      These medications were sent to [facility] #[id number] - [place], [place] - [address]  [address] [address]     Phone:  [phone]     lisinopril 20 MG tablet                Primary Care Provider Office Phone # Fax #    Zechariah Mcdonald [phone] [address] [place] [address]        Equal Access to Services     [facility] AH: Hadii clara carbone Soapollo, waaxda luqadaha, qaybta kaalmada adeegyada, ashley penn. So [facility] [phone].    ATENCIÓN: Si habla español, tiene a meyers disposición servicios gratuitos de asistencia lingüística. Llame al [phone].    We " comply with applicable federal civil rights laws and [place] laws. We do not discriminate on the basis of race, color, national origin, age, disability, sex, sexual orientation, or gender identity.            Thank you!     Thank you for choosing [facility]  for your care. Our goal is always to provide you with excellent care. Hearing back from our patients is one way we can continue to improve our services. Please take a few minutes to complete the written survey that you may receive in the mail after your visit with us. Thank you!             Your Updated Medication List - Protect others around you: Learn how to safely use, store and throw away your medicines at www.disposemymeds.org.          This list is accurate as of: 1/3/18  4:12 PM.  Always use your most recent med list.                   Brand Name Dispense Instructions for use Diagnosis    ibuprofen 800 MG tablet    ADVIL/MOTRIN    90 tablet    Take 1 tablet (800 mg) by mouth every 6 hours as needed for moderate pain    S/P  section       lisinopril 20 MG tablet    PRINIVIL/ZESTRIL    90 tablet    Take 1 tablet (20 mg) by mouth daily    Benign essential hypertension       omeprazole 20 MG CR capsule    priLOSEC    90 capsule    Take 1 capsule (20 mg) by mouth daily    Heartburn during pregnancy in third trimester       prenatal multivitamin plus iron 27-0.8 MG Tabs per tablet     100 tablet    Take 1 tablet by mouth daily    Encounter for supervision of normal first pregnancy in first trimester       senna-docusate 8.6-50 MG per tablet    SENOKOT-S;PERICOLACE    100 tablet    Take 1-2 tablets by mouth 2 times daily    Constipation due to pain medication

## 2018-02-21 ENCOUNTER — MYC REFILL (OUTPATIENT)
Dept: FAMILY MEDICINE | Facility: CLINIC | Age: 33
End: 2018-02-21

## 2018-02-21 DIAGNOSIS — O26.893 HEARTBURN DURING PREGNANCY IN THIRD TRIMESTER: ICD-10-CM

## 2018-02-21 DIAGNOSIS — Z34.01 ENCOUNTER FOR SUPERVISION OF NORMAL FIRST PREGNANCY IN FIRST TRIMESTER: ICD-10-CM

## 2018-02-21 DIAGNOSIS — R12 HEARTBURN DURING PREGNANCY IN THIRD TRIMESTER: ICD-10-CM

## 2018-02-22 RX ORDER — PRENATAL VIT/IRON FUM/FOLIC AC 27MG-0.8MG
1 TABLET ORAL DAILY
Qty: 100 TABLET | Refills: 3 | Status: SHIPPED | OUTPATIENT
Start: 2018-02-22 | End: 2019-09-13

## 2018-02-22 NOTE — TELEPHONE ENCOUNTER
"Requested Prescriptions   Pending Prescriptions Disp Refills     Prenatal Vit-Fe Fumarate-FA (PRENATAL MULTIVITAMIN PLUS IRON) 27-0.8 MG TABS per tablet 100 tablet 3     Sig: Take 1 tablet by mouth daily    There is no refill protocol information for this order        omeprazole (PRILOSEC) 20 MG CR capsule 90 capsule 3     Sig: Take 1 capsule (20 mg) by mouth daily    PPI Protocol Passed    2/22/2018  7:46 AM       Passed - Not on Clopidogrel (unless Pantoprazole ordered)       Passed - No diagnosis of osteoporosis on record       Passed - Recent or future visit with authorizing provider's specialty    Patient had office visit in the last year or has a visit in the next 30 days with authorizing provider.  See \"Patient Info\" tab in inbasket, or \"Choose Columns\" in Meds & Orders section of the refill encounter.            Passed - Patient is age 18 or older       Passed - No active pregnacy on record       Passed - No positive pregnancy test in past 12 months          Last Written Prescription Date:  6/20/17  Last Fill Quantity: 90,  # refills: 3   Last Office Visit with [facility], P or Answer.To prescribing provider:  1/3/18   Future Office Visit:    Next 5 appointments (look out 90 days)     Mar 07, 2018  3:00 PM CST   Office Visit with Zechariah Mcdonald MD   [address] [id number] [phone]                 Prenatal vit plus      Last Written Prescription Date:  2/20/17  Last Fill Quantity: 100,   # refills: 3  Last Office Visit: 1/3/18  Future Office visit:    Next 5 appointments (look out 90 days)     Mar 07, 2018  3:00 PM CST   Office Visit with Zechariah Mcdonald MD   [address] [id number] [phone]                   Routing refill request to provider for review/approval because:  Drug not on the [facility], [facility] or Answer.To refill protocol or controlled " substance

## 2018-02-22 NOTE — TELEPHONE ENCOUNTER
Message from MyChart:  Original authorizing provider: Zechariah Mcdonald MD, MD    Shari Arshad would like a refill of the following medications:  Prenatal Vit-Fe Fumarate-FA (PRENATAL MULTIVITAMIN PLUS IRON) 27-0.8 MG TABS per tablet [Zechariah Mcdonald MD, MD]  omeprazole (PRILOSEC) 20 MG CR capsule [Zechariah Mcdonald MD, MD]    Preferred pharmacy: [facility] #[id number] - [place], [place] - [address]    Comment:

## 2018-03-07 ENCOUNTER — OFFICE VISIT (OUTPATIENT)
Dept: FAMILY MEDICINE | Facility: CLINIC | Age: 33
End: 2018-03-07
Payer: COMMERCIAL

## 2018-03-07 VITALS
HEART RATE: 95 BPM | DIASTOLIC BLOOD PRESSURE: 102 MMHG | BODY MASS INDEX: 40.1 KG/M2 | RESPIRATION RATE: 20 BRPM | TEMPERATURE: 97.2 F | WEIGHT: 241 LBS | OXYGEN SATURATION: 97 % | SYSTOLIC BLOOD PRESSURE: 152 MMHG

## 2018-03-07 DIAGNOSIS — I10 BENIGN ESSENTIAL HYPERTENSION: Primary | ICD-10-CM

## 2018-03-07 PROCEDURE — 99213 OFFICE O/P EST LOW 20 MIN: CPT | Performed by: FAMILY MEDICINE

## 2018-03-07 RX ORDER — METOPROLOL SUCCINATE 50 MG/1
50 TABLET, EXTENDED RELEASE ORAL DAILY
Qty: 90 TABLET | Refills: 3 | Status: SHIPPED | OUTPATIENT
Start: 2018-03-07 | End: 2019-01-12

## 2018-03-07 ASSESSMENT — PAIN SCALES - GENERAL: PAINLEVEL: NO PAIN (0)

## 2018-03-07 NOTE — NURSING NOTE
"Chief Complaint   Patient presents with     Hypertension     recheck       Initial BP (!) 156/104  Pulse 95  Temp 97.2  F (36.2  C) (Tympanic)  Resp 20  Wt 241 lb (109.3 kg)  SpO2 97%  BMI 40.1 kg/m2 Estimated body mass index is 40.1 kg/(m^2) as calculated from the following:    Height as of 1/3/18: 5' 5\" (1.651 m).    Weight as of this encounter: 241 lb (109.3 kg).  Medication Reconciliation: complete    "

## 2018-03-07 NOTE — MR AVS SNAPSHOT
After Visit Summary   3/7/2018    Shari Arshad    MRN: [id number]           Patient Information     Date Of Birth          1985        Visit Information        Provider Department      3/7/2018 3:00 PM Zechariah Mcdonald MD [facility]         Follow-ups after your visit        Your next 10 appointments already scheduled     Mar 07, 2018  3:00 PM CST   Office Visit with Zechariah Mcdonald MD   [facility] ([facility]    [address] [place] [id number] [phone]           Bring a current list of meds and any records pertaining to this visit. For Physicals, please bring immunization records and any forms needing to be filled out. Please arrive 10 minutes early to complete paperwork.              Who to contact     If you have questions or need follow up information about today's clinic visit or your schedule please contact [facility] directly at [phone].  Normal or non-critical lab and imaging results will be communicated to you by RotaBanhart, letter or phone within 4 business days after the clinic has received the results. If you do not hear from us within 7 days, please contact the clinic through dateIITianst or phone. If you have a critical or abnormal lab result, we will notify you by phone as soon as possible.  Submit refill requests through Apmetrix or call your pharmacy and they will forward the refill request to us. Please allow 3 business days for your refill to be completed.          Additional Information About Your Visit        MyChart Information     Apmetrix gives you secure access to your electronic health record. If you see a primary care provider, you can also send messages to your care team and make appointments. If you have questions, please call your primary care clinic.  If you do not have a primary care provider, please call [phone] and they will assist you.        Care EveryWhere ID     This  is your Care EveryWhere ID. This could be used by other organizations to access your [place] medical records  [id number]        Your Vitals Were     Pulse Temperature Respirations Pulse Oximetry BMI (Body Mass Index)       95 97.2  F (36.2  C) (Tympanic) 20 97% 40.1 kg/m2        Blood Pressure from Last 3 Encounters:   03/07/18 (!) 156/104   01/03/18 (!) 154/100   11/13/17 (!) 144/98    Weight from Last 3 Encounters:   03/07/18 241 lb (109.3 kg)   01/03/18 242 lb (109.8 kg)   11/13/17 247 lb (112 kg)              Today, you had the following     No orders found for display       Primary Care Provider Office Phone # Fax #    Zechariah Mcdonald [phone] [address] [place] [address]        Equal Access to Services     [facility] : Hadii aad ku hadasho Sotieraali, waaxda luqadaha, qaybta kaalmada adeegyada, waxdenice gore . So [facility] [phone].    ATENCIÓN: Si habla español, tiene a meyers disposición servicios gratuitos de asistencia lingüística. Reinamike al [phone].    We comply with applicable federal civil rights laws and [place] laws. We do not discriminate on the basis of race, color, national origin, age, disability, sex, sexual orientation, or gender identity.            Thank you!     Thank you for choosing [facility]  for your care. Our goal is always to provide you with excellent care. Hearing back from our patients is one way we can continue to improve our services. Please take a few minutes to complete the written survey that you may receive in the mail after your visit with us. Thank you!             Your Updated Medication List - Protect others around you: Learn how to safely use, store and throw away your medicines at www.disposemymeds.org.          This list is accurate as of 3/7/18  2:51 PM.  Always use your most recent med list.                   Brand Name Dispense Instructions for use Diagnosis    lisinopril 20 MG  tablet    PRINIVIL/ZESTRIL    90 tablet    Take 1 tablet (20 mg) by mouth daily    Benign essential hypertension       omeprazole 20 MG CR capsule    priLOSEC    90 capsule    Take 1 capsule (20 mg) by mouth daily    Heartburn during pregnancy in third trimester       prenatal multivitamin plus iron 27-0.8 MG Tabs per tablet     100 tablet    Take 1 tablet by mouth daily    Encounter for supervision of normal first pregnancy in first trimester

## 2018-03-07 NOTE — PROGRESS NOTES
"  SUBJECTIVE:   Shari Arshad is a 32 year old female who presents to clinic today for the following health issues:      Hypertension Follow-up      Outpatient blood pressures are being checked at work.  Results are 144/87 and 127/74.    Low Salt Diet: not monitoring salt      Amount of exercise or physical activity: None    Problems taking medications regularly: No    Medication side effects: none    Diet: regular (no restrictions)        PROBLEMS TO ADD ON...  She had stopped her labetalol a few months ago because she thought her blood pressures were doing better.  At work she is getting getting blood pressures in the 120-140 range systolically and high 70s to high 80s diastolically.  She has not had any visual disturbances no significant edema or swelling or other symptoms.  She tolerates the lisinopril just fine.  She tries to use a salt substitutes i.e. potassium chloride instead of sodium chloride but thinks she use regular salt last night for dinner.    Problem list and histories reviewed & adjusted, as indicated.  Additional history: as documented    Patient Active Problem List   Diagnosis     Esophageal reflux     Lactose intolerances     Benign essential hypertension, postpartum     Past Surgical History:   Procedure Laterality Date      SECTION N/A 2017    Procedure:  SECTION;   Section;  Surgeon: Zechariah Mcdonald MD;  Location: [facility]     HC EXCIS PRIMARY GANGLION WRIST  3/24/2004    Excision, volar wrist ganglion, left.     HC EXCIS RECURRENT GANGLION WRIST  2006    Recurrent volar wrist ganglion, left.     OPEN REDUCTION INTERNAL FIXATION WRIST      Cadavar bone       Social History   Substance Use Topics     Smoking status: Current Every Day Smoker     Packs/day: 0.50     Types: Cigarettes     Smokeless tobacco: Never Used      Comment: \"Trying to quit\"   Reports 4-6 cig/day     Alcohol use 0.0 oz/week     0 Standard drinks or equivalent per week      Comment: " 2x a month not while pregnant     Family History   Problem Relation Age of Onset     Lipids Mother      Musculoskeletal Disorder Mother      ms     Allergies Mother      Depression Mother      Genitourinary Problems Mother      Respiratory Mother      CPAP for sleep apnea     Multiple Sclerosis Mother      Hypertension Father      Lipids Father      Hypertension Paternal Grandmother      Hypertension Paternal Grandfather      CEREBROVASCULAR DISEASE Maternal Grandmother      CANCER Maternal Grandmother      ovarian     Gynecology Maternal Grandmother      ovarian ca     Asthma Sister      Psychotic Disorder Sister      depression (s/p an amputation at age 3)     Respiratory Sister      CPAP for sleep apnea     DIABETES Sister      adult-onset     DIABETES Maternal Uncle      type 2         Recent Labs   Lab Test  01/03/18   1602  08/29/17   1404  08/11/16   1438   09/21/11   0845   LDL   --    --   97   --   120   HDL   --    --   69   --   58   TRIG   --    --   55   --   175*   ALT   --   22  28   --    --    CR  0.76  0.84  0.84   < >   --    GFRESTIMATED  87  78  78   < >   --    GFRESTBLACK  >90  >90  >90  African American GFR Calc     < >   --    POTASSIUM  4.0   --   3.8   < >   --    TSH   --    --   0.85   --    --     < > = values in this interval not displayed.      BP Readings from Last 3 Encounters:   03/07/18 (!) 152/102   01/03/18 (!) 154/100   11/13/17 (!) 144/98    Wt Readings from Last 3 Encounters:   03/07/18 241 lb (109.3 kg)   01/03/18 242 lb (109.8 kg)   11/13/17 247 lb (112 kg)                  Labs reviewed in EPIC    Reviewed and updated as needed this visit by clinical staff  Tobacco  Allergies  Meds       Reviewed and updated as needed this visit by Provider         ROS:  Constitutional, HEENT, cardiovascular, pulmonary, gi and gu systems are negative, except as otherwise noted.    OBJECTIVE:     BP (!) 152/102 (BP Location: Left arm, Patient Position: Chair, Cuff Size: Adult Large)   "Pulse 95  Temp 97.2  F (36.2  C) (Tympanic)  Resp 20  Wt 241 lb (109.3 kg)  SpO2 97%  BMI 40.1 kg/m2  Body mass index is 40.1 kg/(m^2).  GENERAL: healthy, alert and no distress  RESP: lungs clear to auscultation - no rales, rhonchi or wheezes  CV: regular rate and rhythm, normal S1 S2, no S3 or S4, no murmur, click or rub, no peripheral edema and peripheral pulses strong  MS: No edema    Diagnostic Test Results:  none     ASSESSMENT/PLAN:   (I10) Benign essential hypertension  (primary encounter diagnosis)  Comment: Her blood pressure still elevated and not well controlled.  She is going need a second agent.  Plan: metoprolol succinate (TOPROL-XL) 50 MG 24 hr         tablet        We discussed switching her from the labetalol which is twice a day to a long-acting metoprolol.  She will be more compliant that she can take all medications just once a day.  She will take both the metoprolol and her lisinopril at bedtime and we will recheck her blood pressure in 2-3 months.  She still has not lost much of her weight since delivery she is only down about 9 pounds.  She is aware that she really needs to focus on losing some weight.  She had gained over 60 pounds during pregnancy went from 188 up to 250 pounds.    Tobacco Cessation:   reports that she has been smoking Cigarettes.  She has been smoking about 0.50 packs per day. She has never used smokeless tobacco.      BMI:   Estimated body mass index is 40.1 kg/(m^2) as calculated from the following:    Height as of 1/3/18: 5' 5\" (1.651 m).    Weight as of this encounter: 241 lb (109.3 kg).   Weight management plan: Not addressed today    Electronically signed by:  Zechariah Mcdonald M.D.  3/7/2018    "

## 2018-06-05 ENCOUNTER — OFFICE VISIT (OUTPATIENT)
Dept: FAMILY MEDICINE | Facility: CLINIC | Age: 33
End: 2018-06-05
Payer: COMMERCIAL

## 2018-06-05 VITALS
TEMPERATURE: 97.9 F | DIASTOLIC BLOOD PRESSURE: 86 MMHG | WEIGHT: 239 LBS | HEART RATE: 80 BPM | RESPIRATION RATE: 16 BRPM | BODY MASS INDEX: 39.77 KG/M2 | SYSTOLIC BLOOD PRESSURE: 136 MMHG | OXYGEN SATURATION: 97 %

## 2018-06-05 PROCEDURE — 99213 OFFICE O/P EST LOW 20 MIN: CPT | Performed by: FAMILY MEDICINE

## 2018-06-05 ASSESSMENT — PAIN SCALES - GENERAL: PAINLEVEL: NO PAIN (0)

## 2018-06-05 NOTE — MR AVS SNAPSHOT
After Visit Summary   6/5/2018    Shari Arshad    MRN: [id number]           Patient Information     Date Of Birth          1985        Visit Information        Provider Department      6/5/2018 2:40 PM Zechariah Mcdonald MD [facility]         Follow-ups after your visit        Who to contact     If you have questions or need follow up information about today's clinic visit or your schedule please contact [facility] directly at [phone].  Normal or non-critical lab and imaging results will be communicated to you by Etherstackhart, letter or phone within 4 business days after the clinic has received the results. If you do not hear from us within 7 days, please contact the clinic through Etherstackhart or phone. If you have a critical or abnormal lab result, we will notify you by phone as soon as possible.  Submit refill requests through FDTEK or call your pharmacy and they will forward the refill request to us. Please allow 3 business days for your refill to be completed.          Additional Information About Your Visit        MyChart Information     FDTEK gives you secure access to your electronic health record. If you see a primary care provider, you can also send messages to your care team and make appointments. If you have questions, please call your primary care clinic.  If you do not have a primary care provider, please call [phone] and they will assist you.        Care EveryWhere ID     This is your Care EveryWhere ID. This could be used by other organizations to access your [facility] medical records  [id number]        Your Vitals Were     Pulse Temperature Respirations Last Period Pulse Oximetry BMI (Body Mass Index)    80 97.9  F (36.6  C) (Temporal) 16 05/10/2018 97% 39.77 kg/m2       Blood Pressure from Last 3 Encounters:   06/05/18 136/86   03/07/18 (!) 152/102   01/03/18 (!) 154/100    Weight from Last 3 Encounters:   06/05/18 239 lb (108.4 kg)    03/07/18 241 lb (109.3 kg)   01/03/18 242 lb (109.8 kg)              Today, you had the following     No orders found for display       Primary Care Provider Office Phone # Fax #    Zechariah Mcdonald [phone]       [address] [place] [address]        Equal Access to Services     [facility]: Hadii aad ku hadasho Soomaali, waaxda luqadaha, qaybta kaalmada adeegyada, waxay idiin hayaan adeeg kharash la'aan . So [facility] [phone].    ATENCIÓN: Si habla español, tiene a meyers disposición servicios gratuitos de asistencia lingüística. Llame al [phone].    We comply with applicable federal civil rights laws and [place] laws. We do not discriminate on the basis of race, color, national origin, age, disability, sex, sexual orientation, or gender identity.            Thank you!     Thank you for choosing [facility]  for your care. Our goal is always to provide you with excellent care. Hearing back from our patients is one way we can continue to improve our services. Please take a few minutes to complete the written survey that you may receive in the mail after your visit with us. Thank you!             Your Updated Medication List - Protect others around you: Learn how to safely use, store and throw away your medicines at www.disposemymeds.org.          This list is accurate as of 6/5/18  4:37 PM.  Always use your most recent med list.                   Brand Name Dispense Instructions for use Diagnosis    lisinopril 20 MG tablet    PRINIVIL/ZESTRIL    90 tablet    Take 1 tablet (20 mg) by mouth daily    Benign essential hypertension       metoprolol succinate 50 MG 24 hr tablet    TOPROL-XL    90 tablet    Take 1 tablet (50 mg) by mouth daily    Benign essential hypertension       omeprazole 20 MG CR capsule    priLOSEC    90 capsule    Take 1 capsule (20 mg) by mouth daily    Heartburn during pregnancy in third trimester       prenatal multivitamin plus iron 27-0.8  MG Tabs per tablet     100 tablet    Take 1 tablet by mouth daily    Encounter for supervision of normal first pregnancy in first trimester

## 2018-06-05 NOTE — PROGRESS NOTES
"  SUBJECTIVE:   Shari Arshad is a 32 year old female who presents to clinic today for the following health issues:      Hypertension Follow-up      Outpatient blood pressures are being checked at home.  Results are 150/90's.    Low Salt Diet: not monitoring salt      Amount of exercise or physical activity: 2-3 days/week for an average of 30-90 minutes    Problems taking medications regularly: No    Medication side effects: none    Diet: regular (no restrictions)        PROBLEMS TO ADD ON...  none    Problem list and histories reviewed & adjusted, as indicated.  Additional history: as documented    Patient Active Problem List   Diagnosis     Esophageal reflux     Lactose intolerances     Benign essential hypertension, postpartum     Past Surgical History:   Procedure Laterality Date      SECTION N/A 2017    Procedure:  SECTION;   Section;  Surgeon: Zechariah Mcdonald MD;  Location[facility]     HC EXCIS PRIMARY GANGLION WRIST  3/24/2004    Excision, volar wrist ganglion, left.     HC EXCIS RECURRENT GANGLION WRIST  2006    Recurrent volar wrist ganglion, left.     OPEN REDUCTION INTERNAL FIXATION WRIST      Cadavar bone       Social History   Substance Use Topics     Smoking status: Current Every Day Smoker     Packs/day: 0.50     Types: Cigarettes     Smokeless tobacco: Never Used      Comment: \"Trying to quit\"   Reports 4-6 cig/day     Alcohol use 0.0 oz/week     0 Standard drinks or equivalent per week      Comment: 2x a month not while pregnant     Family History   Problem Relation Age of Onset     Lipids Mother      Musculoskeletal Disorder Mother      ms     Allergies Mother      Depression Mother      Genitourinary Problems Mother      Respiratory Mother      CPAP for sleep apnea     Multiple Sclerosis Mother      Hypertension Father      Lipids Father      Hypertension Paternal Grandmother      Hypertension Paternal Grandfather      CEREBROVASCULAR DISEASE Maternal " Grandmother      CANCER Maternal Grandmother      ovarian     Gynecology Maternal Grandmother      ovarian ca     Asthma Sister      Psychotic Disorder Sister      depression (s/p an amputation at age 3)     Respiratory Sister      CPAP for sleep apnea     DIABETES Sister      adult-onset     DIABETES Maternal Uncle      type 2         Current Outpatient Prescriptions   Medication Sig Dispense Refill     lisinopril (PRINIVIL/ZESTRIL) 20 MG tablet Take 1 tablet (20 mg) by mouth daily 90 tablet 1     metoprolol succinate (TOPROL-XL) 50 MG 24 hr tablet Take 1 tablet (50 mg) by mouth daily 90 tablet 3     omeprazole (PRILOSEC) 20 MG CR capsule Take 1 capsule (20 mg) by mouth daily 90 capsule 3     Prenatal Vit-Fe Fumarate-FA (PRENATAL MULTIVITAMIN PLUS IRON) 27-0.8 MG TABS per tablet Take 1 tablet by mouth daily 100 tablet 3     Allergies   Allergen Reactions     Fruit Extracts Hives     Fruits from trees only if she touches them     Milk [Lac Bovis] GI Disturbance     No Known Drug Allergies      Recent Labs   Lab Test  01/03/18   1602  08/29/17   1404  08/11/16   1438   09/21/11   0845   LDL   --    --   97   --   120   HDL   --    --   69   --   58   TRIG   --    --   55   --   175*   ALT   --   22  28   --    --    CR  0.76  0.84  0.84   < >   --    GFRESTIMATED  87  78  78   < >   --    GFRESTBLACK  >90  >90  >90  African American GFR Calc     < >   --    POTASSIUM  4.0   --   3.8   < >   --    TSH   --    --   0.85   --    --     < > = values in this interval not displayed.      BP Readings from Last 3 Encounters:   06/05/18 136/86   03/07/18 (!) 152/102   01/03/18 (!) 154/100    Wt Readings from Last 3 Encounters:   06/05/18 239 lb (108.4 kg)   03/07/18 241 lb (109.3 kg)   01/03/18 242 lb (109.8 kg)                  Labs reviewed in EPIC    Reviewed and updated as needed this visit by clinical staff  Tobacco  Allergies  Meds       Reviewed and updated as needed this visit by Provider      "    ROS:  Constitutional, HEENT, cardiovascular, pulmonary, gi and gu systems are negative, except as otherwise noted.    OBJECTIVE:     /86 (BP Location: Right arm, Patient Position: Chair, Cuff Size: Adult Large)  Pulse 80  Temp 97.9  F (36.6  C) (Temporal)  Resp 16  Wt 239 lb (108.4 kg)  LMP 05/10/2018  SpO2 97%  BMI 39.77 kg/m2  Body mass index is 39.77 kg/(m^2).  GENERAL: healthy, alert and no distress  NECK: no adenopathy, no asymmetry, masses, or scars and thyroid normal to palpation  RESP: lungs clear to auscultation - no rales, rhonchi or wheezes  CV: regular rate and rhythm, normal S1 S2, no S3 or S4, no murmur, click or rub, no peripheral edema and peripheral pulses strong  MS: no gross musculoskeletal defects noted, no edema    Diagnostic Test Results:  none     ASSESSMENT/PLAN:   (O10.03) Benign essential hypertension, postpartum  (primary encounter diagnosis)  Comment: finally she is at goal BP  Plan: continue current medication and dose.  Follow upin 6 months.  Encouraged more activity and continued weight loss.      Tobacco Cessation:   reports that she has been smoking Cigarettes.  She has been smoking about 0.50 packs per day. She has never used smokeless tobacco.      BMI:   Estimated body mass index is 39.77 kg/(m^2) as calculated from the following:    Height as of 1/3/18: 5' 5\" (1.651 m).    Weight as of this encounter: 239 lb (108.4 kg).   Weight management plan: Discussed healthy diet and exercise guidelines and patient will follow up in 6 months in clinic to re-evaluate.    Electronically signed by:  Zechariah Mcdonald M.D.  6/5/2018    "

## 2018-06-05 NOTE — NURSING NOTE
Chief Complaint   Patient presents with     Hypertension     recheck     Wart     on right foot she wants looked at.     MP/MA

## 2018-09-05 ENCOUNTER — ALLIED HEALTH/NURSE VISIT (OUTPATIENT)
Dept: FAMILY MEDICINE | Facility: CLINIC | Age: 33
End: 2018-09-05
Payer: COMMERCIAL

## 2018-09-05 DIAGNOSIS — Z23 NEED FOR PROPHYLACTIC VACCINATION AND INOCULATION AGAINST INFLUENZA: Primary | ICD-10-CM

## 2018-09-05 DIAGNOSIS — I10 BENIGN ESSENTIAL HYPERTENSION: ICD-10-CM

## 2018-09-05 PROCEDURE — 90471 IMMUNIZATION ADMIN: CPT

## 2018-09-05 PROCEDURE — 99207 ZZC NO CHARGE NURSE ONLY: CPT

## 2018-09-05 PROCEDURE — 90686 IIV4 VACC NO PRSV 0.5 ML IM: CPT

## 2018-09-05 RX ORDER — LISINOPRIL 20 MG/1
20 TABLET ORAL DAILY
Qty: 90 TABLET | Refills: 3 | Status: SHIPPED | OUTPATIENT
Start: 2018-09-05 | End: 2019-09-28

## 2018-09-05 NOTE — NURSING NOTE
Chief Complaint   Patient presents with     Imm/Inj     flu shot     Prior to injection verified patient identity using patient's name and date of birth.  Due to injection administration, patient instructed to remain in clinic for 15 minutes  afterwards, and to report any adverse reaction to me immediately.    Quyen English MA 9/5/2018

## 2018-09-05 NOTE — MR AVS SNAPSHOT
After Visit Summary   9/5/2018    Shari Arshad    MRN: [id number]           Patient Information     Date Of Birth          1985        Visit Information        Provider Department      9/5/2018 4:30 PM BERNIE ROSSI MA [facility]        Today's Diagnoses     Need for prophylactic vaccination and inoculation against influenza    -  1       Follow-ups after your visit        Who to contact     If you have questions or need follow up information about today's clinic visit or your schedule please contact [facility] directly at [phone].  Normal or non-critical lab and imaging results will be communicated to you by TopVisiblehart, letter or phone within 4 business days after the clinic has received the results. If you do not hear from us within 7 days, please contact the clinic through Alphiont or phone. If you have a critical or abnormal lab result, we will notify you by phone as soon as possible.  Submit refill requests through ibabybox or call your pharmacy and they will forward the refill request to us. Please allow 3 business days for your refill to be completed.          Additional Information About Your Visit        MyChart Information     ibabybox gives you secure access to your electronic health record. If you see a primary care provider, you can also send messages to your care team and make appointments. If you have questions, please call your primary care clinic.  If you do not have a primary care provider, please call [phone] and they will assist you.        Care EveryWhere ID     This is your Care EveryWhere ID. This could be used by other organizations to access your [facility] medical records  [id number]         Blood Pressure from Last 3 Encounters:   06/05/18 136/86   03/07/18 (!) 152/102   01/03/18 (!) 154/100    Weight from Last 3 Encounters:   06/05/18 239 lb (108.4 kg)   03/07/18 241 lb (109.3 kg)   01/03/18 242 lb (109.8 kg)              We Performed  the Following     FLU VACCINE, SPLIT VIRUS, IM (QUADRIVALENT) [64479]- >3 YRS     Vaccine Administration, Initial [16528]          Where to get your medicines      These medications were sent to [id number] - [place], [place] - [address]  [address] [place] [address]     Phone:  [phone]     lisinopril 20 MG tablet          Primary Care Provider Office Phone # Fax #    Zechariah Mcdonald [phone]       [address] [place] [address]        Equal Access to Services     [facility]: Hadii aad ku hadasho Soomaali, waaxda luqadaha, qaybta kaalmada adeegyada, waxay emily haymona gore . So [facility] [phone].    ATENCIÓN: Si habla español, tiene a meyers disposición servicios gratuitos de asistencia lingüística. [facility] [phone].    We comply with applicable federal civil rights laws and [place] laws. We do not discriminate on the basis of race, color, national origin, age, disability, sex, sexual orientation, or gender identity.            Thank you!     Thank you for choosing [facility]  for your care. Our goal is always to provide you with excellent care. Hearing back from our patients is one way we can continue to improve our services. Please take a few minutes to complete the written survey that you may receive in the mail after your visit with us. Thank you!             Your Updated Medication List - Protect others around you: Learn how to safely use, store and throw away your medicines at www.disposemymeds.org.          This list is accurate as of 9/5/18  4:32 PM.  Always use your most recent med list.                   Brand Name Dispense Instructions for use Diagnosis    lisinopril 20 MG tablet    PRINIVIL/ZESTRIL    90 tablet    Take 1 tablet (20 mg) by mouth daily    Benign essential hypertension       metoprolol succinate 50 MG 24 hr tablet    TOPROL-XL    90 tablet    Take 1 tablet (50 mg) by mouth daily    Benign essential  hypertension       omeprazole 20 MG CR capsule    priLOSEC    90 capsule    Take 1 capsule (20 mg) by mouth daily    Heartburn during pregnancy in third trimester       prenatal multivitamin plus iron 27-0.8 MG Tabs per tablet     100 tablet    Take 1 tablet by mouth daily    Encounter for supervision of normal first pregnancy in first trimester

## 2018-09-05 NOTE — PROGRESS NOTES

## 2018-12-24 ENCOUNTER — MYC REFILL (OUTPATIENT)
Dept: FAMILY MEDICINE | Facility: CLINIC | Age: 33
End: 2018-12-24

## 2018-12-24 DIAGNOSIS — R12 HEARTBURN DURING PREGNANCY IN THIRD TRIMESTER: ICD-10-CM

## 2018-12-24 DIAGNOSIS — O26.893 HEARTBURN DURING PREGNANCY IN THIRD TRIMESTER: ICD-10-CM

## 2018-12-26 NOTE — TELEPHONE ENCOUNTER
"Omeprazole  Last Written Prescription Date:  02/22/2018  Last Fill Quantity: 90,  # refills: 3   Last office visit: 06/05/2018 with prescribing provider:  Tamara   Future Office Visit:  None  Prescription approved per [facility] Ref[place] Protocol.    Requested Prescriptions   Pending Prescriptions Disp Refills     omeprazole (PRILOSEC) 20 MG DR capsule 90 capsule 3     Sig: Take 1 capsule (20 mg) by mouth daily    PPI Protocol Passed - 12/24/2018  8:15 AM       Passed - Not on Clopidogrel (unless Pantoprazole ordered)       Passed - No diagnosis of osteoporosis on record       Passed - Recent (12 mo) or future (30 days) visit within the authorizing provider's specialty    Patient had office visit in the last 12 months or has a visit in the next 30 days with authorizing provider or within the authorizing provider's specialty.  See \"Patient Info\" tab in inbasket, or \"Choose Columns\" in Meds & Orders section of the refill encounter.         Passed - Patient is age 18 or older       Passed - No active pregnacy on record       Passed - No positive pregnancy test in past 12 months      Britt Baptiste RN   "

## 2019-01-12 ENCOUNTER — MYC REFILL (OUTPATIENT)
Dept: FAMILY MEDICINE | Facility: CLINIC | Age: 34
End: 2019-01-12

## 2019-01-12 DIAGNOSIS — I10 BENIGN ESSENTIAL HYPERTENSION: ICD-10-CM

## 2019-01-14 RX ORDER — METOPROLOL SUCCINATE 50 MG/1
50 TABLET, EXTENDED RELEASE ORAL DAILY
Qty: 90 TABLET | Refills: 1 | Status: SHIPPED | OUTPATIENT
Start: 2019-01-14 | End: 2019-02-14

## 2019-01-15 NOTE — TELEPHONE ENCOUNTER
"Metoprolol  Last Written Prescription Date: 03/07/2018   Last Fill Quantity: 90,  # refills: 3   Last office visit: 06/05/2018 with prescribing provider:  Tamara   Future Office Visit:  None  Prescription approved per [facility] Refill Protocol.    Requested Prescriptions   Pending Prescriptions Disp Refills     metoprolol succinate ER (TOPROL-XL) 50 MG 24 hr tablet 90 tablet 3     Sig: Take 1 tablet (50 mg) by mouth daily    Beta-Blockers Protocol Passed - 1/14/2019 10:23 AM       Passed - Blood pressure under 140/90 in past 12 months    BP Readings from Last 3 Encounters:   06/05/18 136/86   03/07/18 (!) 152/102   01/03/18 (!) 154/100          Passed - Patient is age 6 or older       Passed - Recent (12 mo) or future (30 days) visit within the authorizing provider's specialty    Patient had office visit in the last 12 months or has a visit in the next 30 days with authorizing provider or within the authorizing provider's specialty.  See \"Patient Info\" tab in inbasket, or \"Choose Columns\" in Meds & Orders section of the refill encounter.         Passed - Medication is active on med list      Britt Baptiste RN   "

## 2019-02-14 ENCOUNTER — OFFICE VISIT (OUTPATIENT)
Dept: FAMILY MEDICINE | Facility: CLINIC | Age: 34
End: 2019-02-14
Payer: COMMERCIAL

## 2019-02-14 VITALS
SYSTOLIC BLOOD PRESSURE: 150 MMHG | HEIGHT: 65 IN | TEMPERATURE: 98.3 F | DIASTOLIC BLOOD PRESSURE: 90 MMHG | BODY MASS INDEX: 42.22 KG/M2 | WEIGHT: 253.4 LBS | HEART RATE: 80 BPM | RESPIRATION RATE: 18 BRPM | OXYGEN SATURATION: 97 %

## 2019-02-14 DIAGNOSIS — I10 BENIGN ESSENTIAL HYPERTENSION: ICD-10-CM

## 2019-02-14 DIAGNOSIS — L84 CORN OR CALLUS: ICD-10-CM

## 2019-02-14 DIAGNOSIS — E66.01 MORBID OBESITY (H): ICD-10-CM

## 2019-02-14 PROCEDURE — 17110 DESTRUCTION B9 LES UP TO 14: CPT | Performed by: FAMILY MEDICINE

## 2019-02-14 PROCEDURE — 99214 OFFICE O/P EST MOD 30 MIN: CPT | Mod: 25 | Performed by: FAMILY MEDICINE

## 2019-02-14 RX ORDER — METOPROLOL SUCCINATE 100 MG/1
100 TABLET, EXTENDED RELEASE ORAL DAILY
Qty: 90 TABLET | Refills: 3 | Status: SHIPPED | OUTPATIENT
Start: 2019-02-14 | End: 2020-04-06

## 2019-02-14 ASSESSMENT — PAIN SCALES - GENERAL: PAINLEVEL: NO PAIN (0)

## 2019-02-14 ASSESSMENT — MIFFLIN-ST. JEOR: SCORE: 1855.05

## 2019-02-14 NOTE — PROGRESS NOTES
SUBJECTIVE:   Shari Arshad is a 33 year old female who presents to clinic today for the following health issues:      Corn on right foot.   June of 2017      PROBLEMS TO ADD ON...  Patient states that the corn on her foot is starting to be more uncomfortable.  She thinks is due to her work boots that she is due to change this week.  She has not done anything to treat the corn.    Her second concern is her blood pressure.  She states that she has been taking her medications regularly which include 50 mg of metoprolol and 20 mg of lisinopril every evening.  She has not been able to lose weight since she delivered her baby so she thinks is mainly due to the excessive weight that she is carrying around.  She has no side effects from the medication.    Problem list and histories reviewed & adjusted, as indicated.  Additional history: as documented     Current Outpatient Medications   Medication Sig Dispense Refill     lisinopril (PRINIVIL/ZESTRIL) 20 MG tablet Take 1 tablet (20 mg) by mouth daily 90 tablet 3     metoprolol succinate ER (TOPROL-XL) 100 MG 24 hr tablet Take 1 tablet (100 mg) by mouth daily 90 tablet 3     omeprazole (PRILOSEC) 20 MG DR capsule Take 1 capsule (20 mg) by mouth daily 90 capsule 1     Prenatal Vit-Fe Fumarate-FA (PRENATAL MULTIVITAMIN PLUS IRON) 27-0.8 MG TABS per tablet Take 1 tablet by mouth daily (Patient not taking: Reported on 2/14/2019) 100 tablet 3     Allergies   Allergen Reactions     Fruit Extracts Hives     Fruits from trees only if she touches them     Milk [Lac Bovis] GI Disturbance     No Known Drug Allergies      Recent Labs   Lab Test 01/03/18  1602 08/29/17  1404 08/11/16  1438  09/21/11  0845   LDL  --   --  97  --  120   HDL  --   --  69  --  58   TRIG  --   --  55  --  175*   ALT  --  22 28  --   --    CR 0.76 0.84 0.84   < >  --    GFRESTIMATED 87 78 78   < >  --    GFRESTBLACK >90 >90 >90  African American GFR Calc     < >  --    POTASSIUM 4.0  --  3.8   < >  --   "  TSH  --   --  0.85  --   --     < > = values in this interval not displayed.      BP Readings from Last 3 Encounters:   02/14/19 150/90   06/05/18 136/86   03/07/18 (!) 152/102    Wt Readings from Last 3 Encounters:   02/14/19 114.9 kg (253 lb 6.4 oz)   06/05/18 108.4 kg (239 lb)   03/07/18 109.3 kg (241 lb)                    Reviewed and updated as needed this visit by clinical staff  Tobacco  Allergies  Meds  Med Hx  Surg Hx  Fam Hx  Soc Hx      Reviewed and updated as needed this visit by Provider         ROS:  Constitutional, HEENT, cardiovascular, pulmonary, gi and gu systems are negative, except as otherwise noted.    OBJECTIVE:     /90   Pulse 80   Temp 98.3  F (36.8  C) (Temporal)   Resp 18   Ht 1.659 m (5' 5.3\")   Wt 114.9 kg (253 lb 6.4 oz)   SpO2 97%   BMI 41.78 kg/m    Body mass index is 41.78 kg/m .  GENERAL: healthy, alert and no distress  RESP: lungs clear to auscultation - no rales, rhonchi or wheezes  CV: regular rate and rhythm, normal S1 S2, no S3 or S4, no murmur, click or rub, no peripheral edema and peripheral pulses strong  SKIN: Patient has a 6 mm lesion on the plantar surface of the right foot along the fifth MP joint.  This is a little bit lateral to the pad itself and looks classic for a corn or possible plantar wart.  There is a lot of callus skin buildup around this.    Procedure: I took a #10 blade and trimmed off all the dead callus skin.  There is no evidence of a plantar wart this was classic for a corn.  I got down to healthy tissue and there was a little bit of bleeding so I stopped trimming.  I then took the cryo gun and froze this area with liquid nitrogen in a freeze thaw refreeze technique x3.  She tolerated this quite well.    Diagnostic tests: None    ASSESSMENT/PLAN:   (L84) Corn or callus   Comment: I discussed the etiology of corns or calluses as being constant or continuous irritation on that area of the skin.  This is most likely from 1 of her work " "boots.  Plan: DESTRUCT BENIGN LESION, UP TO 14        I did trim the callus off and throws the underlying tissue to see if we get this callus cleared, but she really needs to look at how her work boots fit so that she does not continue to have problems with callus or corn buildup.    (I10) Benign essential hypertension  Comment: Blood pressure is not well controlled on her current doses of medications.  Plan: metoprolol succinate ER (TOPROL-XL) 100 MG 24         hr tablet        She is agreeable to increase her metoprolol to 100 mg daily so we will take 2 of the 50 mg tabs at bedtime until they are gone and will then fill the 100 mg tablets that I sent to the pharmacy for her.  She is back in April with her daughter for her next well exam so we will recheck her blood pressure at that time.    (E66.01) Morbid obesity (H)  Comment: Unfortunately she has continued to gain weight is been actually heavier now than she was when she delivered her baby.  Plan: If she cannot start getting her weight under better control I will talk to her about the possibility of referral to a bariatric program to discuss various weight loss programs that they have including the sleeve gastrectomy versus Neftaly-en-Y.    Tobacco Cessation:   reports that she has been smoking cigarettes.  She has been smoking about 0.50 packs per day. she has never used smokeless tobacco.      BMI:   Estimated body mass index is 41.78 kg/m  as calculated from the following:    Height as of this encounter: 1.659 m (5' 5.3\").    Weight as of this encounter: 114.9 kg (253 lb 6.4 oz).   Weight management plan: She has not been able to work out like she normally does or had in the past and really has not been watching her diet very closely.    Electronically signed by:  Zechariah Mcdonald M.D.  2/14/2019    "

## 2019-09-11 ENCOUNTER — OFFICE VISIT (OUTPATIENT)
Dept: URGENT CARE | Facility: RETAIL CLINIC | Age: 34
End: 2019-09-11
Payer: COMMERCIAL

## 2019-09-11 VITALS
HEART RATE: 61 BPM | TEMPERATURE: 98.7 F | OXYGEN SATURATION: 96 % | DIASTOLIC BLOOD PRESSURE: 108 MMHG | SYSTOLIC BLOOD PRESSURE: 153 MMHG

## 2019-09-11 DIAGNOSIS — B37.31 YEAST INFECTION OF THE VAGINA: Primary | ICD-10-CM

## 2019-09-11 DIAGNOSIS — R30.0 DYSURIA: ICD-10-CM

## 2019-09-11 LAB
BILIRUB UR QL: ABNORMAL
CLARITY: ABNORMAL
COLOR UR: YELLOW
GLUCOSE URINE: ABNORMAL MG/DL
HGB UR QL: ABNORMAL
KETONES UR QL: ABNORMAL MG/DL
NITRITE UR QL STRIP: ABNORMAL
PH UR STRIP: 6 PH (ref 5–7)
PROT UR QL: ABNORMAL MG/DL
SP GR UR STRIP: 1.02 (ref 1–1.03)
SPECIMEN VOL UR: ABNORMAL ML
UROBILINOGEN UR QL STRIP: 0.2 EU/DL (ref 0.2–1)
WBC #/AREA URNS HPF: ABNORMAL /[HPF]

## 2019-09-11 PROCEDURE — 99213 OFFICE O/P EST LOW 20 MIN: CPT | Performed by: NURSE PRACTITIONER

## 2019-09-11 PROCEDURE — 81003 URINALYSIS AUTO W/O SCOPE: CPT | Performed by: NURSE PRACTITIONER

## 2019-09-11 PROCEDURE — 87086 URINE CULTURE/COLONY COUNT: CPT | Performed by: NURSE PRACTITIONER

## 2019-09-11 RX ORDER — FLUCONAZOLE 150 MG/1
150 TABLET ORAL DAILY
Qty: 2 TABLET | Refills: 0 | Status: SHIPPED | OUTPATIENT
Start: 2019-09-11 | End: 2019-09-13

## 2019-09-11 ASSESSMENT — ENCOUNTER SYMPTOMS
DIFFICULTY URINATING: 0
DYSURIA: 0
LIGHT-HEADEDNESS: 0
COLOR CHANGE: 0
FEVER: 0
FATIGUE: 0
NAUSEA: 0
VOMITING: 0
DIAPHORESIS: 0
HEMATURIA: 0
ABDOMINAL PAIN: 0
CHILLS: 0
MYALGIAS: 0
FLANK PAIN: 0
FREQUENCY: 0
ACTIVITY CHANGE: 0
APPETITE CHANGE: 0
WEAKNESS: 0

## 2019-09-11 NOTE — PATIENT INSTRUCTIONS
Yeast infection given thick white discharge and itch - diflucan  Recommend low sugar right now  UA normal. Urine culture pending, we will call you only if culture shows growth.  Drink plenty of fluids. Limit caffeine and alcohol as these are bladder irritants.  May take tylenol or ibuprofen as needed for discomfort.   If you develop any vomiting, high fevers or lower back pain, these can be signs of a kidney infection and you should be seen in urgent care or in the ER.  Prevention of future infections by drinking cranberry juice, urination after intercourse and wiping from front to back after using the toilet.  Please follow up with primary care provider if symptoms return, if you're not improving, worsening or new symptoms or for any adverse reactions to medications. May need wetprep and/or further testing.

## 2019-09-11 NOTE — PROGRESS NOTES
"Chief Complaint   Patient presents with     Rule out Urinary Tract Infection     x 4 days     SUBJECTIVE:  Shari Arshad is a 33 year old female who  presents today for a possible UTI.   She has symptoms of thick white vaginal discharge, itch, bladder pressure for 4 days.  Symptom timing described as gradual onset and moderate in severity.    This patient does have a history of urinary tract infections and yeast infections.  There is no hematuria, flank pain, fever, chills, nausea, vomiting, concern for STIs.    Past Medical History:   Diagnosis Date     Gastro-oesophageal reflux disease      Heartburn during pregnancy in third trimester 2017     Hypertension      Lactose intolerance      PIH (pregnancy induced hypertension) 2017     Rh(-), needs rhogam at 28 wks gestation 2017     S/P  section 2017     Current Outpatient Medications   Medication Sig Dispense Refill     fluconazole (DIFLUCAN) 150 MG tablet Take 1 tablet (150 mg) by mouth daily for 2 doses 2 tablet 0     lisinopril (PRINIVIL/ZESTRIL) 20 MG tablet Take 1 tablet (20 mg) by mouth daily 90 tablet 3     metoprolol succinate ER (TOPROL-XL) 100 MG 24 hr tablet Take 1 tablet (100 mg) by mouth daily 90 tablet 3     omeprazole (PRILOSEC) 20 MG DR capsule Take 1 capsule (20 mg) by mouth daily 90 capsule 1     Prenatal Vit-Fe Fumarate-FA (PRENATAL MULTIVITAMIN PLUS IRON) 27-0.8 MG TABS per tablet Take 1 tablet by mouth daily (Patient not taking: Reported on 2019) 100 tablet 3     Social History     Tobacco Use     Smoking status: Current Every Day Smoker     Packs/day: 0.50     Types: Cigarettes     Smokeless tobacco: Never Used     Tobacco comment: \"Trying to quit\"   Reports 4-6 cig/day   Substance Use Topics     Alcohol use: Yes     Alcohol/week: 0.0 oz     Comment: 2x a month not while pregnant     Allergies   Allergen Reactions     Fruit Extracts Hives     Fruits from trees only if she touches them     Milk [Lac Bovis] GI " Disturbance     No Known Drug Allergies      Review of Systems   Constitutional: Negative for activity change, appetite change, chills, diaphoresis, fatigue and fever.   Gastrointestinal: Negative for abdominal pain, nausea and vomiting.   Genitourinary: Positive for pelvic pain (or bladder pressure, unsure) and vaginal discharge (thick white, itch). Negative for difficulty urinating, dysuria, flank pain, frequency, hematuria, urgency, vaginal bleeding and vaginal pain.   Musculoskeletal: Negative for myalgias.   Skin: Negative for color change and rash.   Neurological: Negative for syncope, weakness and light-headedness.     OBJECTIVE:  BP (!) 153/108   Pulse 61   Temp 98.7  F (37.1  C) (Oral)   SpO2 96%      Physical Exam   Constitutional: She is oriented to person, place, and time. She appears well-developed and well-nourished. No distress.   Eyes: Pupils are equal, round, and reactive to light. Conjunctivae and EOM are normal.   Neck: Normal range of motion. Neck supple.   Cardiovascular: Normal rate.   Pulmonary/Chest: Effort normal.   Abdominal: Soft. Bowel sounds are normal. There is no tenderness.   Genitourinary:   Genitourinary Comments: No CVA tenderness   Musculoskeletal: Normal range of motion.   Neurological: She is alert and oriented to person, place, and time.   Skin: Skin is warm and dry. Capillary refill takes less than 2 seconds. She is not diaphoretic. No pallor.   Psychiatric: She has a normal mood and affect. Her behavior is normal.   Vitals reviewed.    Results for orders placed or performed in visit on 09/11/19   UA without Microscopic   Result Value Ref Range    Glucose Urine Neg neg mg/dL    Bilirubin Urine Neg neg    Ketones Urine Neg neg mg/dL    Specific Gravity Urine 1.020 1.003 - 1.035    pH Urine 6.0 5.0 - 7.0 pH    Protein Urine neg neg - neg mg/dL    Urobilinogen Urine 0.2 0.2 - 1.0 EU/dL    Nitrite Urine Neg NEG    Blood Urine Neg neg    Leukocytes neg     Color Urine Yellow      Clarity cloudy     Volume       ASSESSMENT:    ICD-10-CM    1. Yeast infection of the vagina B37.3 fluconazole (DIFLUCAN) 150 MG tablet   2. Dysuria R30.0 Urine Culture Aerobic Bacterial     UA without Microscopic     PLAN:   Patient Instructions   Yeast infection given thick white discharge and itch - diflucan  Recommend low sugar right now  UA normal. Urine culture pending, we will call you only if culture shows growth.  Drink plenty of fluids. Limit caffeine and alcohol as these are bladder irritants.  May take tylenol or ibuprofen as needed for discomfort.   If you develop any vomiting, high fevers or lower back pain, these can be signs of a kidney infection and you should be seen in urgent care or in the ER.  Prevention of future infections by drinking cranberry juice, urination after intercourse and wiping from front to back after using the toilet.  Please follow up with primary care provider if symptoms return, if you're not improving, worsening or new symptoms or for any adverse reactions to medications. May need wetprep and/or further testing.      Follow up with primary care provider with any problems, questions or concerns or if symptoms worsen or fail to improve. Patient agreed to plan and verbalized understanding.    Jennifer Casillas, JUDSON-BC  [facility] [place]

## 2019-09-12 LAB
BACTERIA SPEC CULT: NORMAL
Lab: NORMAL
SPECIMEN SOURCE: NORMAL

## 2019-09-13 ENCOUNTER — OFFICE VISIT (OUTPATIENT)
Dept: FAMILY MEDICINE | Facility: CLINIC | Age: 34
End: 2019-09-13
Payer: COMMERCIAL

## 2019-09-13 ENCOUNTER — TELEPHONE (OUTPATIENT)
Dept: URGENT CARE | Facility: RETAIL CLINIC | Age: 34
End: 2019-09-13

## 2019-09-13 VITALS
DIASTOLIC BLOOD PRESSURE: 88 MMHG | TEMPERATURE: 96.4 F | SYSTOLIC BLOOD PRESSURE: 138 MMHG | WEIGHT: 253 LBS | BODY MASS INDEX: 41.72 KG/M2 | RESPIRATION RATE: 18 BRPM | HEART RATE: 74 BPM | OXYGEN SATURATION: 98 %

## 2019-09-13 PROCEDURE — 99213 OFFICE O/P EST LOW 20 MIN: CPT | Performed by: FAMILY MEDICINE

## 2019-09-13 ASSESSMENT — PAIN SCALES - GENERAL: PAINLEVEL: NO PAIN (0)

## 2019-09-13 NOTE — PROGRESS NOTES
Subjective     ED/UC Followup:    Facility:  [facility]   Date of visit: 9/11  Reason for visit: UTI/ high bp   Current Status: still having the same symptoms          Shari is a 33 year old female who comes to clinic   Having elevated BP's at 2 different occasions at outpatient clinics, in the 150 systolic  Not home monitoring  Feels well and tolerating her current regimen without any difficulty   has a vasectomy     Review of Systems   ROS COMP: Constitutional, HEENT, cardiovascular, pulmonary, gi and gu systems are negative, except as otherwise noted.      Objective    /88   Pulse 74   Temp 96.4  F (35.8  C) (Temporal)   Resp 18   Wt 114.8 kg (253 lb)   LMP 08/30/2019 (Exact Date)   SpO2 98%   BMI 41.72 kg/m       Wt Readings from Last 2 Encounters:   09/13/19 114.8 kg (253 lb)   02/14/19 114.9 kg (253 lb 6.4 oz)       Physical Exam   GENERAL: healthy, alert and no distress  NECK: no adenopathy, no asymmetry, masses, or scars and thyroid normal to palpation  RESP: lungs clear to auscultation - no rales, rhonchi or wheezes  CV: regular rate and rhythm, normal S1 S2, no S3 or S4, no murmur, click or rub, no peripheral edema and peripheral pulses strong  ABDOMEN: soft, nontender, no hepatosplenomegaly, no masses and bowel sounds normal  MS: no gross musculoskeletal defects noted, no edema    Diagnostic Test Results:  Labs reviewed in Epic        Assessment & Plan       ICD-10-CM    1. Benign essential hypertension, postpartum O10.03        Elevated blood pressures.  We discussed her options including increasing her lisinopril at this time, or doing more outpatient monitoring.  She wishes instead to watch her blood pressures and then follow-up with her primary with her numbers    Tremayne Allred MD  [facility]

## 2019-09-13 NOTE — TELEPHONE ENCOUNTER
Patients culture came back abnormal.  Dr. Galvan would like to know if the patients results we discussed at her apt today with Dr. Allred.  Patient called and message was left for her to call the clinic for clarification.  Dr. Allred did you give the patient results today at her office visit with you?  Doug Maldonado MA

## 2019-09-28 ENCOUNTER — HEALTH MAINTENANCE LETTER (OUTPATIENT)
Age: 34
End: 2019-09-28

## 2019-09-28 DIAGNOSIS — I10 BENIGN ESSENTIAL HYPERTENSION: ICD-10-CM

## 2019-09-28 NOTE — LETTER
[address]   [address]  Tel. [phone]/ Fax [phone]    December 11, 2019        Shari Arshad  [address] [facility] [place] [address]          Dear Ms. Mccarthy LINO Jeancarlos:    Your previous orders for lab work have been extended.  Please call to schedule a lab appointment and return to the clinic to have the labs drawn at your earliest convenience.    If you are required to be fasting for these tests,  remember to have nothing by mouth (except water) after midnight on the night before your test.    If you have any questions or concerns, please contact our office.    Sincerely,       Zechariah Mcdonald MD

## 2019-09-30 NOTE — TELEPHONE ENCOUNTER
"Routing to PCP for refill if appropriate, lab orders pended.     Lisinopril  Last Written Prescription Date:  09/05/2018  Last Fill Quantity: 90,  # refills: 3   Last office visit: 9/13/2019 with prescribing provider:  Brigida   Future Office Visit:  None  Routing refill request to provider for review/approval because:  Labs out of range:  Creatinine, Potassium.     Requested Prescriptions   Pending Prescriptions Disp Refills     lisinopril (PRINIVIL/ZESTRIL) 20 MG tablet [Pharmacy Med Name: LISINOPRIL 20MG TABS] 90 tablet 3     Sig: TAKE ONE TABLET BY MOUTH ONCE DAILY       ACE Inhibitors (Including Combos) Protocol Failed - 9/28/2019  9:57 PM        Failed - Normal serum creatinine on file in past 12 months     Recent Labs   Lab Test 01/03/18  1602   CR 0.76           Failed - Normal serum potassium on file in past 12 months     Recent Labs   Lab Test 01/03/18  1602   POTASSIUM 4.0           Passed - Blood pressure under 140/90 in past 12 months     BP Readings from Last 3 Encounters:   09/13/19 138/88   09/11/19 (!) 153/108   02/14/19 150/90           Passed - Recent (12 mo) or future (30 days) visit within the authorizing provider's specialty     Patient has had an office visit with the authorizing provider or a provider within the authorizing providers department within the previous 12 mos or has a future within next 30 days. See \"Patient Info\" tab in inbasket, or \"Choose Columns\" in Meds & Orders section of the refill encounter.          Passed - Medication is active on med list        Passed - Patient is age 18 or older        Passed - No active pregnancy on record        Passed - No positive pregnancy test within past 12 months      Britt Baptiste RN   "

## 2019-10-02 RX ORDER — LISINOPRIL 20 MG/1
TABLET ORAL
Qty: 90 TABLET | Refills: 3 | Status: SHIPPED | OUTPATIENT
Start: 2019-10-02 | End: 2020-10-14

## 2020-04-03 DIAGNOSIS — I10 BENIGN ESSENTIAL HYPERTENSION: ICD-10-CM

## 2020-04-03 DIAGNOSIS — R12 HEARTBURN DURING PREGNANCY IN THIRD TRIMESTER: ICD-10-CM

## 2020-04-03 DIAGNOSIS — O26.893 HEARTBURN DURING PREGNANCY IN THIRD TRIMESTER: ICD-10-CM

## 2020-04-06 RX ORDER — METOPROLOL SUCCINATE 100 MG/1
TABLET, EXTENDED RELEASE ORAL
Qty: 90 TABLET | Refills: 1 | Status: SHIPPED | OUTPATIENT
Start: 2020-04-06 | End: 2020-10-14

## 2020-04-06 NOTE — TELEPHONE ENCOUNTER
Omeprazole:  Routing refill request to provider for review/approval because:  A break in medication      Metoprolol:  Rx refilled per RN protocol.  Loida Hoyos, LANETTEN, RN

## 2020-04-06 NOTE — TELEPHONE ENCOUNTER
"Requested Prescriptions   Pending Prescriptions Disp Refills     omeprazole (PRILOSEC) 20 MG DR capsule [Pharmacy Med Name: OMEPRAZOLE 20MG CPDR] 90 capsule 1     Sig: TAKE ONE CAPSULE BY MOUTH ONCE DAILY   Last Written Prescription Date:  12/26/18  Last Fill Quantity: 90,  # refills: 1   Last office visit: 9/13/2019 with prescribing provider:     Future Office Visit:   Next 5 appointments (look out 90 days)    May 11, 2020 12:20 PM CDT  (Arrive by 12:10 PM)  PHYSICAL with Zechariah Mcdonald MD  [address] [address]  [phone]             PPI Protocol Passed - 4/3/2020  9:32 PM        Passed - Not on Clopidogrel (unless Pantoprazole ordered)        Passed - No diagnosis of osteoporosis on record        Passed - Recent (12 mo) or future (30 days) visit within the authorizing provider's specialty     Patient has had an office visit with the authorizing provider or a provider within the authorizing providers department within the previous 12 mos or has a future within next 30 days. See \"Patient Info\" tab in inbasket, or \"Choose Columns\" in Meds & Orders section of the refill encounter.              Passed - Medication is active on med list        Passed - Patient is age 18 or older        Passed - No active pregnacy on record        Passed - No positive pregnancy test in past 12 months           metoprolol succinate ER (TOPROL-XL) 100 MG 24 hr tablet [Pharmacy Med Name: METOPROLOL SUCCINATE ER 100MG TB24] 90 tablet 3     Sig: TAKE ONE TABLET BY MOUTH ONCE DAILY   Last Written Prescription Date:  2/14/19  Last Fill Quantity: 90,  # refills: 3   Last office visit: 9/13/2019 with prescribing provider:     Future Office Visit:   Next 5 appointments (look out 90 days)    May 11, 2020 12:20 PM CDT  (Arrive by 12:10 PM)  PHYSICAL with Zechariah Mcdonald MD  [facility] ([address] " "[place] [address]  [phone]             Beta-Blockers Protocol Passed - 4/3/2020  9:32 PM        Passed - Blood pressure under 140/90 in past 12 months     BP Readings from Last 3 Encounters:   09/13/19 138/88   09/11/19 (!) 153/108   02/14/19 150/90                 Passed - Patient is age 6 or older        Passed - Recent (12 mo) or future (30 days) visit within the authorizing provider's specialty     Patient has had an office visit with the authorizing provider or a provider within the authorizing providers department within the previous 12 mos or has a future within next 30 days. See \"Patient Info\" tab in inbasket, or \"Choose Columns\" in Meds & Orders section of the refill encounter.              Passed - Medication is active on med list             "

## 2020-05-05 DIAGNOSIS — I10 BENIGN ESSENTIAL HYPERTENSION: ICD-10-CM

## 2020-05-05 LAB
ANION GAP SERPL CALCULATED.3IONS-SCNC: 5 MMOL/L (ref 3–14)
BUN SERPL-MCNC: 10 MG/DL (ref 7–30)
CALCIUM SERPL-MCNC: 8.7 MG/DL (ref 8.5–10.1)
CHLORIDE SERPL-SCNC: 106 MMOL/L (ref 94–109)
CO2 SERPL-SCNC: 28 MMOL/L (ref 20–32)
CREAT SERPL-MCNC: 0.82 MG/DL (ref 0.52–1.04)
CREAT UR-MCNC: 30 MG/DL
GFR SERPL CREATININE-BSD FRML MDRD: >90 ML/MIN/{1.73_M2}
GLUCOSE SERPL-MCNC: 90 MG/DL (ref 70–99)
MICROALBUMIN UR-MCNC: <5 MG/L
MICROALBUMIN/CREAT UR: NORMAL MG/G CR (ref 0–25)
POTASSIUM SERPL-SCNC: 3.9 MMOL/L (ref 3.4–5.3)
SODIUM SERPL-SCNC: 139 MMOL/L (ref 133–144)

## 2020-05-05 PROCEDURE — 80048 BASIC METABOLIC PNL TOTAL CA: CPT | Performed by: FAMILY MEDICINE

## 2020-05-05 PROCEDURE — 82043 UR ALBUMIN QUANTITATIVE: CPT | Performed by: FAMILY MEDICINE

## 2020-05-05 PROCEDURE — 36415 COLL VENOUS BLD VENIPUNCTURE: CPT | Performed by: FAMILY MEDICINE

## 2020-07-16 ENCOUNTER — OFFICE VISIT (OUTPATIENT)
Dept: FAMILY MEDICINE | Facility: CLINIC | Age: 35
End: 2020-07-16
Payer: COMMERCIAL

## 2020-07-16 VITALS
DIASTOLIC BLOOD PRESSURE: 100 MMHG | HEIGHT: 66 IN | WEIGHT: 261 LBS | RESPIRATION RATE: 16 BRPM | HEART RATE: 78 BPM | BODY MASS INDEX: 41.95 KG/M2 | TEMPERATURE: 97 F | SYSTOLIC BLOOD PRESSURE: 158 MMHG

## 2020-07-16 DIAGNOSIS — R20.2 PARESTHESIA OF FOOT, BILATERAL: ICD-10-CM

## 2020-07-16 DIAGNOSIS — Z13.220 LIPID SCREENING: ICD-10-CM

## 2020-07-16 DIAGNOSIS — E66.01 MORBID OBESITY (H): ICD-10-CM

## 2020-07-16 DIAGNOSIS — Z00.00 ROUTINE GENERAL MEDICAL EXAMINATION AT A HEALTH CARE FACILITY: Primary | ICD-10-CM

## 2020-07-16 DIAGNOSIS — N94.3 PMS (PREMENSTRUAL SYNDROME): ICD-10-CM

## 2020-07-16 DIAGNOSIS — Z23 NEED FOR VACCINATION: ICD-10-CM

## 2020-07-16 LAB
ALBUMIN SERPL-MCNC: 3.9 G/DL (ref 3.4–5)
ALP SERPL-CCNC: 94 U/L (ref 40–150)
ALT SERPL W P-5'-P-CCNC: 25 U/L (ref 0–50)
ANION GAP SERPL CALCULATED.3IONS-SCNC: 3 MMOL/L (ref 3–14)
AST SERPL W P-5'-P-CCNC: 12 U/L (ref 0–45)
BILIRUB SERPL-MCNC: 0.2 MG/DL (ref 0.2–1.3)
BUN SERPL-MCNC: 12 MG/DL (ref 7–30)
CALCIUM SERPL-MCNC: 8.8 MG/DL (ref 8.5–10.1)
CHLORIDE SERPL-SCNC: 106 MMOL/L (ref 94–109)
CHOLEST SERPL-MCNC: 201 MG/DL
CO2 SERPL-SCNC: 29 MMOL/L (ref 20–32)
CREAT SERPL-MCNC: 0.8 MG/DL (ref 0.52–1.04)
CREAT UR-MCNC: 23 MG/DL
GFR SERPL CREATININE-BSD FRML MDRD: >90 ML/MIN/{1.73_M2}
GLUCOSE SERPL-MCNC: 79 MG/DL (ref 70–99)
HDLC SERPL-MCNC: 47 MG/DL
LDLC SERPL CALC-MCNC: 122 MG/DL
MICROALBUMIN UR-MCNC: <5 MG/L
MICROALBUMIN/CREAT UR: NORMAL MG/G CR (ref 0–25)
NONHDLC SERPL-MCNC: 154 MG/DL
POTASSIUM SERPL-SCNC: 4 MMOL/L (ref 3.4–5.3)
PROT SERPL-MCNC: 7.5 G/DL (ref 6.8–8.8)
SODIUM SERPL-SCNC: 138 MMOL/L (ref 133–144)
TRIGL SERPL-MCNC: 161 MG/DL
TSH SERPL DL<=0.005 MIU/L-ACNC: 1.46 MU/L (ref 0.4–4)

## 2020-07-16 PROCEDURE — 82306 VITAMIN D 25 HYDROXY: CPT | Performed by: FAMILY MEDICINE

## 2020-07-16 PROCEDURE — 99395 PREV VISIT EST AGE 18-39: CPT | Mod: 25 | Performed by: FAMILY MEDICINE

## 2020-07-16 PROCEDURE — 80053 COMPREHEN METABOLIC PANEL: CPT | Performed by: FAMILY MEDICINE

## 2020-07-16 PROCEDURE — 80061 LIPID PANEL: CPT | Performed by: FAMILY MEDICINE

## 2020-07-16 PROCEDURE — 90471 IMMUNIZATION ADMIN: CPT | Performed by: FAMILY MEDICINE

## 2020-07-16 PROCEDURE — 84443 ASSAY THYROID STIM HORMONE: CPT | Performed by: FAMILY MEDICINE

## 2020-07-16 PROCEDURE — 90732 PPSV23 VACC 2 YRS+ SUBQ/IM: CPT | Performed by: FAMILY MEDICINE

## 2020-07-16 PROCEDURE — 36415 COLL VENOUS BLD VENIPUNCTURE: CPT | Performed by: FAMILY MEDICINE

## 2020-07-16 PROCEDURE — 82043 UR ALBUMIN QUANTITATIVE: CPT | Performed by: FAMILY MEDICINE

## 2020-07-16 PROCEDURE — 99213 OFFICE O/P EST LOW 20 MIN: CPT | Mod: 25 | Performed by: FAMILY MEDICINE

## 2020-07-16 RX ORDER — FLUOXETINE 10 MG/1
CAPSULE ORAL
Qty: 90 CAPSULE | Refills: 3 | Status: SHIPPED | OUTPATIENT
Start: 2020-07-16 | End: 2021-05-20

## 2020-07-16 RX ORDER — HYDROCHLOROTHIAZIDE 25 MG/1
25 TABLET ORAL DAILY
Qty: 90 TABLET | Refills: 3 | Status: SHIPPED | OUTPATIENT
Start: 2020-07-16 | End: 2021-05-20

## 2020-07-16 SDOH — ECONOMIC STABILITY: TRANSPORTATION INSECURITY
IN THE PAST 12 MONTHS, HAS THE LACK OF TRANSPORTATION KEPT YOU FROM MEDICAL APPOINTMENTS OR FROM GETTING MEDICATIONS?: NO

## 2020-07-16 SDOH — SOCIAL STABILITY: SOCIAL INSECURITY
WITHIN THE LAST YEAR, HAVE YOU BEEN KICKED, HIT, SLAPPED, OR OTHERWISE PHYSICALLY HURT BY YOUR PARTNER OR EX-PARTNER?: NO

## 2020-07-16 SDOH — SOCIAL STABILITY: SOCIAL NETWORK
DO YOU BELONG TO ANY CLUBS OR ORGANIZATIONS SUCH AS CHURCH GROUPS UNIONS, FRATERNAL OR ATHLETIC GROUPS, OR SCHOOL GROUPS?: YES

## 2020-07-16 SDOH — HEALTH STABILITY: MENTAL HEALTH: HOW OFTEN DO YOU HAVE A DRINK CONTAINING ALCOHOL?: MONTHLY OR LESS

## 2020-07-16 SDOH — SOCIAL STABILITY: SOCIAL INSECURITY
WITHIN THE LAST YEAR, HAVE TO BEEN RAPED OR FORCED TO HAVE ANY KIND OF SEXUAL ACTIVITY BY YOUR PARTNER OR EX-PARTNER?: NO

## 2020-07-16 SDOH — ECONOMIC STABILITY: FOOD INSECURITY: WITHIN THE PAST 12 MONTHS, THE FOOD YOU BOUGHT JUST DIDN'T LAST AND YOU DIDN'T HAVE MONEY TO GET MORE.: NEVER TRUE

## 2020-07-16 SDOH — ECONOMIC STABILITY: TRANSPORTATION INSECURITY
IN THE PAST 12 MONTHS, HAS LACK OF TRANSPORTATION KEPT YOU FROM MEETINGS, WORK, OR FROM GETTING THINGS NEEDED FOR DAILY LIVING?: NO

## 2020-07-16 SDOH — SOCIAL STABILITY: SOCIAL NETWORK: HOW OFTEN DO YOU GET TOGETHER WITH FRIENDS OR RELATIVES?: ONCE A WEEK

## 2020-07-16 SDOH — SOCIAL STABILITY: SOCIAL NETWORK
IN A TYPICAL WEEK, HOW MANY TIMES DO YOU TALK ON THE PHONE WITH FAMILY, FRIENDS, OR NEIGHBORS?: MORE THAN THREE TIMES A WEEK

## 2020-07-16 SDOH — HEALTH STABILITY: PHYSICAL HEALTH: ON AVERAGE, HOW MANY MINUTES DO YOU ENGAGE IN EXERCISE AT THIS LEVEL?: 20 MIN

## 2020-07-16 SDOH — HEALTH STABILITY: MENTAL HEALTH: HOW MANY STANDARD DRINKS CONTAINING ALCOHOL DO YOU HAVE ON A TYPICAL DAY?: 1 OR 2

## 2020-07-16 SDOH — SOCIAL STABILITY: SOCIAL NETWORK: ARE YOU MARRIED, WIDOWED, DIVORCED, SEPARATED, NEVER MARRIED, OR LIVING WITH A PARTNER?: MARRIED

## 2020-07-16 SDOH — ECONOMIC STABILITY: FOOD INSECURITY: WITHIN THE PAST 12 MONTHS, YOU WORRIED THAT YOUR FOOD WOULD RUN OUT BEFORE YOU GOT MONEY TO BUY MORE.: NEVER TRUE

## 2020-07-16 SDOH — SOCIAL STABILITY: SOCIAL NETWORK: HOW OFTEN DO YOU ATTENT MEETINGS OF THE CLUB OR ORGANIZATION YOU BELONG TO?: 1 TO 4 TIMES PER YEAR

## 2020-07-16 SDOH — SOCIAL STABILITY: SOCIAL INSECURITY: WITHIN THE LAST YEAR, HAVE YOU BEEN HUMILIATED OR EMOTIONALLY ABUSED IN OTHER WAYS BY YOUR PARTNER OR EX-PARTNER?: NO

## 2020-07-16 SDOH — ECONOMIC STABILITY: INCOME INSECURITY: HOW HARD IS IT FOR YOU TO PAY FOR THE VERY BASICS LIKE FOOD, HOUSING, MEDICAL CARE, AND HEATING?: NOT HARD AT ALL

## 2020-07-16 SDOH — HEALTH STABILITY: MENTAL HEALTH: HOW OFTEN DO YOU HAVE 6 OR MORE DRINKS ON ONE OCCASION?: NEVER

## 2020-07-16 SDOH — SOCIAL STABILITY: SOCIAL NETWORK: HOW OFTEN DO YOU ATTEND CHURCH OR RELIGIOUS SERVICES?: NEVER

## 2020-07-16 SDOH — SOCIAL STABILITY: SOCIAL INSECURITY: WITHIN THE LAST YEAR, HAVE YOU BEEN AFRAID OF YOUR PARTNER OR EX-PARTNER?: NO

## 2020-07-16 SDOH — HEALTH STABILITY: MENTAL HEALTH
STRESS IS WHEN SOMEONE FEELS TENSE, NERVOUS, ANXIOUS, OR CAN'T SLEEP AT NIGHT BECAUSE THEIR MIND IS TROUBLED. HOW STRESSED ARE YOU?: RATHER MUCH

## 2020-07-16 SDOH — HEALTH STABILITY: PHYSICAL HEALTH: ON AVERAGE, HOW MANY DAYS PER WEEK DO YOU ENGAGE IN MODERATE TO STRENUOUS EXERCISE (LIKE A BRISK WALK)?: 7 DAYS

## 2020-07-16 ASSESSMENT — ANXIETY QUESTIONNAIRES
3. WORRYING TOO MUCH ABOUT DIFFERENT THINGS: SEVERAL DAYS
2. NOT BEING ABLE TO STOP OR CONTROL WORRYING: SEVERAL DAYS
3. WORRYING TOO MUCH ABOUT DIFFERENT THINGS: NOT AT ALL
7. FEELING AFRAID AS IF SOMETHING AWFUL MIGHT HAPPEN: NOT AT ALL
7. FEELING AFRAID AS IF SOMETHING AWFUL MIGHT HAPPEN: MORE THAN HALF THE DAYS
5. BEING SO RESTLESS THAT IT IS HARD TO SIT STILL: SEVERAL DAYS
GAD7 TOTAL SCORE: 10
IF YOU CHECKED OFF ANY PROBLEMS ON THIS QUESTIONNAIRE, HOW DIFFICULT HAVE THESE PROBLEMS MADE IT FOR YOU TO DO YOUR WORK, TAKE CARE OF THINGS AT HOME, OR GET ALONG WITH OTHER PEOPLE: NOT DIFFICULT AT ALL
6. BECOMING EASILY ANNOYED OR IRRITABLE: MORE THAN HALF THE DAYS
5. BEING SO RESTLESS THAT IT IS HARD TO SIT STILL: SEVERAL DAYS
2. NOT BEING ABLE TO STOP OR CONTROL WORRYING: NOT AT ALL
IF YOU CHECKED OFF ANY PROBLEMS ON THIS QUESTIONNAIRE, HOW DIFFICULT HAVE THESE PROBLEMS MADE IT FOR YOU TO DO YOUR WORK, TAKE CARE OF THINGS AT HOME, OR GET ALONG WITH OTHER PEOPLE: VERY DIFFICULT
GAD7 TOTAL SCORE: 4
1. FEELING NERVOUS, ANXIOUS, OR ON EDGE: SEVERAL DAYS
1. FEELING NERVOUS, ANXIOUS, OR ON EDGE: MORE THAN HALF THE DAYS
6. BECOMING EASILY ANNOYED OR IRRITABLE: NEARLY EVERY DAY

## 2020-07-16 ASSESSMENT — MIFFLIN-ST. JEOR: SCORE: 1889.29

## 2020-07-16 ASSESSMENT — PATIENT HEALTH QUESTIONNAIRE - PHQ9
SUM OF ALL RESPONSES TO PHQ QUESTIONS 1-9: 3
5. POOR APPETITE OR OVEREATING: NOT AT ALL
5. POOR APPETITE OR OVEREATING: NOT AT ALL

## 2020-07-16 ASSESSMENT — PAIN SCALES - GENERAL: PAINLEVEL: EXTREME PAIN (8)

## 2020-07-16 NOTE — NURSING NOTE
Prior to immunization administration, verified patients identity using patient s name and date of birth. Please see Immunization Activity for additional information.     Screening Questionnaire for Adult Immunization    Are you sick today?   No   Do you have allergies to medications, food, a vaccine component or latex?   No   Have you ever had a serious reaction after receiving a vaccination?   No   Do you have a long-term health problem with heart, lung, kidney, or metabolic disease (e.g., diabetes), asthma, a blood disorder, no spleen, complement component deficiency, a cochlear implant, or a spinal fluid leak?  Are you on long-term aspirin therapy?   No   Do you have cancer, leukemia, HIV/AIDS, or any other immune system problem?   No   Do you have a parent, brother, or sister with an immune system problem?   No   In the past 3 months, have you taken medications that affect  your immune system, such as prednisone, other steroids, or anticancer drugs; drugs for the treatment of rheumatoid arthritis, Crohn s disease, or psoriasis; or have you had radiation treatments?   No   Have you had a seizure, or a brain or other nervous system problem?   No   During the past year, have you received a transfusion of blood or blood    products, or been given immune (gamma) globulin or antiviral drug?   No   For women: Are you pregnant or is there a chance you could become       pregnant during the next month?   No   Have you received any vaccinations in the past 4 weeks?   No     Immunization questionnaire answers were all negative.         Patient instructed to remain in clinic for 15 minutes afterwards, and to report any adverse reaction to me immediately.       Screening performed by Lis Alvarenga on 7/16/2020 at 3:36 PM.

## 2020-07-16 NOTE — PROGRESS NOTES
SUBJECTIVE:   CC: Shari Arshad is an 34 year old woman who presents for preventive health visit.     Healthy Habits:    Do you get at least three servings of calcium containing foods daily (dairy, green leafy vegetables, etc.)? yes    Amount of exercise or daily activities, outside of work: 0 day(s) per week    Problems taking medications regularly No    Medication side effects: No    Have you had an eye exam in the past two years? no    Do you see a dentist twice per year? yes    Do you have sleep apnea, excessive snoring or daytime drowsiness?yes      PROBLEMS TO ADD ON...  Patient has felt increased stress particularly a few days before her menses, through the 5 to 7 days of her menses and then for a couple days afterwards.  She feels like it is hormonal but there is been a little bit of increased stress at her home.  Her  last year or was admitted to a psych [facility] for kind climb out of a car while it was running down the road.  He is better now but this still has been stressful for her.  She feels like she is having some mood swings but is mainly anxiety symptoms and when we did the PHQ 9 and LINWOOD 7, her symptoms definitely go along with more anxiety than depression.  There is some family history of mood disorders but she is never been treated for depression or anxiety in the past.  She has not been using other chemicals.  Her  is recovering alcoholic so she does not drink around him at all.    Today's PHQ-2 Score:   PHQ-2 ( 1999 Pf[employer]) 9/13/2019 5/23/2017   Q1: Little interest or pleasure in doing things 0 0   Q2: Feeling down, depressed or hopeless 0 1   PHQ-2 Score 0 1   Q1: Little interest or pleasure in doing things - -   Q2: Feeling down, depressed or hopeless - -   PHQ-2 Score - -       Abuse: Current or Past(Physical, Sexual or Emotional)- No  Do you feel safe in your environment? Yes    LINWOOD-7 SCORE 7/16/2020   Total Score 10       PHQ 7/16/2020   PHQ-9 Total Score 3   Q9: Thoughts of  "better off dead/self-harm past 2 weeks Not at all       Social History     Tobacco Use     Smoking status: Current Every Day Smoker     Packs/day: 0.50     Types: Cigarettes     Smokeless tobacco: Never Used     Tobacco comment: \"Trying to quit\"   Reports 4-6 cig/day   Substance Use Topics     Alcohol use: Yes     Alcohol/week: 0.0 standard drinks     Comment: 2x a month not while pregnant     If you drink alcohol do you typically have >3 drinks per day or >7 drinks per week? No                     Reviewed orders with patient.  Reviewed health maintenance and updated orders accordingly - Yes  Lab work is in process  Labs reviewed in EPIC  BP Readings from Last 3 Encounters:   20 (!) 158/100   19 138/88   19 (!) 153/108    Wt Readings from Last 3 Encounters:   20 118.4 kg (261 lb)   19 114.8 kg (253 lb)   19 114.9 kg (253 lb 6.4 oz)                  Patient Active Problem List   Diagnosis     Esophageal reflux     Lactose intolerances     Hypertension goal BP (blood pressure) < 140/90     Benign essential hypertension, postpartum     Morbid obesity (H)     Past Surgical History:   Procedure Laterality Date      SECTION N/A 2017    Procedure:  SECTION;   Section;  Surgeon: Zechariah Mcdonald MD;  Location: [facility]     HC EXCIS PRIMARY GANGLION WRIST  3/24/2004    Excision, volar wrist ganglion, left.     HC EXCIS RECURRENT GANGLION WRIST  2006    Recurrent volar wrist ganglion, left.     OPEN REDUCTION INTERNAL FIXATION WRIST      Cadavar bone       Social History     Tobacco Use     Smoking status: Current Every Day Smoker     Packs/day: 0.50     Types: Cigarettes     Smokeless tobacco: Never Used     Tobacco comment: \"Trying to quit\"   Reports 4-6 cig/day   Substance Use Topics     Alcohol use: Yes     Alcohol/week: 0.0 standard drinks     Comment: 2x a month not while pregnant     Family History   Problem Relation Age of Onset     Lipids Mother  "     Musculoskeletal Disorder Mother         ms     Allergies Mother      Depression Mother      Genitourinary Problems Mother      Respiratory Mother         CPAP for sleep apnea     Multiple Sclerosis Mother      Hypertension Father      Lipids Father      Hypertension Paternal Grandmother      Hypertension Paternal Grandfather      Cerebrovascular Disease Maternal Grandmother      Cancer Maternal Grandmother         ovarian     Gynecology Maternal Grandmother         ovarian ca     Asthma Sister      Psychotic Disorder Sister         depression (s/p an amputation at age 3)     Respiratory Sister         CPAP for sleep apnea     Diabetes Sister         adult-onset     Diabetes Maternal Uncle         type 2         Current Outpatient Medications   Medication Sig Dispense Refill     lisinopril (PRINIVIL/ZESTRIL) 20 MG tablet TAKE ONE TABLET BY MOUTH ONCE DAILY 90 tablet 3     metoprolol succinate ER (TOPROL-XL) 100 MG 24 hr tablet TAKE ONE TABLET BY MOUTH ONCE DAILY 90 tablet 1     omeprazole (PRILOSEC) 20 MG DR capsule TAKE ONE CAPSULE BY MOUTH ONCE DAILY 90 capsule 1     Allergies   Allergen Reactions     Fruit Extracts Hives     Fruits from trees only if she touches them     Milk [Lac Bovis] GI Disturbance     No Known Drug Allergies      Recent Labs   Lab Test 05/05/20  0942 01/03/18  1602 08/29/17  1404 08/11/16  1438   LDL  --   --   --  97   HDL  --   --   --  69   TRIG  --   --   --  55   ALT  --   --  22 28   CR 0.82 0.76 0.84 0.84   GFRESTIMATED >90 87 78 78   GFRESTBLACK >90 >90 >90 >90  African American GFR Calc     POTASSIUM 3.9 4.0  --  3.8   TSH  --   --   --  0.85        Mammogram not appropriate for this patient based on age.    Pertinent mammograms are reviewed under the imaging tab.  History of abnormal Pap smear: NO - age 30-65 PAP every 5 years with negative HPV co-testing recommended  PAP / HPV Latest Ref Rng & Units 8/11/2016 9/21/2011   PAP - NIL NIL   HPV 16 DNA NEG Negative -   HPV 18 DNA  NEG Negative -   OTHER HR HPV NEG Negative -     Reviewed and updated as needed this visit by clinical staff  Tobacco  Allergies  Meds         Reviewed and updated as needed this visit by Provider        Past Medical History:   Diagnosis Date     Gastro-oesophageal reflux disease      Heartburn during pregnancy in third trimester 2017     Hypertension      Lactose intolerance      PIH (pregnancy induced hypertension) 2017     Rh(-), needs rhogam at 28 wks gestation 2017     S/P  section 2017      Past Surgical History:   Procedure Laterality Date      SECTION N/A 2017    Procedure:  SECTION;   Section;  Surgeon: Zechariah Mcdonald MD;  Location: [facility]     HC EXCIS PRIMARY GANGLION WRIST  3/24/2004    Excision, volar wrist ganglion, left.     HC EXCIS RECURRENT GANGLION WRIST  2006    Recurrent volar wrist ganglion, left.     OPEN REDUCTION INTERNAL FIXATION WRIST      Cadavar bone     OB History    Para Term  AB Living   1 1 1 0 0 0   SAB TAB Ectopic Multiple Live Births   0 0 0 0 0      # Outcome Date GA Lbr Stevan/2nd Weight Sex Delivery Anes PTL Lv   1 Term 17 38w0d  2.381 kg (5 lb 4 oz) F CS-LTranv Spinal        Name: MEENU LAWSON      Apgar1: 9  Apgar5: 9      Obstetric Comments   EDC 2017 based on LMP.  Parenting to Errol.       ROS:  CONSTITUTIONAL: NEGATIVE for fever, chills, change in weight  INTEGUMENTARU/SKIN: NEGATIVE for worrisome rashes, moles or lesions  EYES: NEGATIVE for vision changes or irritation  ENT: NEGATIVE for ear, mouth and throat problems  RESP: NEGATIVE for significant cough or SOB  BREAST: NEGATIVE for masses, tenderness or discharge  CV: NEGATIVE for chest pain, palpitations or peripheral edema  GI: NEGATIVE for nausea, abdominal pain, heartburn, or change in bowel habits  : NEGATIVE for unusual urinary or vaginal symptoms. Periods are regular.  MUSCULOSKELETAL: NEGATIVE for significant  "arthralgias or myalgia  NEURO: NEGATIVE for weakness, dizziness or paresthesias  PSYCHIATRIC: anxiety and irritability the 2 days before the week of and 2 days after her period.    OBJECTIVE:   Temp 97  F (36.1  C) (Temporal)   Resp 16   Ht 1.666 m (5' 5.6\")   Wt 118.4 kg (261 lb)   BMI 42.64 kg/m    EXAM:  GENERAL: healthy, alert and no distress  EYES: Eyes grossly normal to inspection, PERRL and conjunctivae and sclerae normal  HENT: ear canals and TM's normal, nose and mouth without ulcers or lesions  NECK: no adenopathy, no asymmetry, masses, or scars and thyroid normal to palpation  RESP: lungs clear to auscultation - no rales, rhonchi or wheezes  BREAST: No breast exam done, we discussed self breast awareness and what to be concerned about, ie; retraction of a nipple, dimpling of the skin or any nipple discharge or aerolar rash that does not clear.   CV: regular rate and rhythm, normal S1 S2, no S3 or S4, no murmur, click or rub, no peripheral edema and peripheral pulses strong  ABDOMEN: soft, obese nontender, no hepatosplenomegaly, no masses and bowel sounds normal  Gyn: Exam deferred, patient not due for a pap smear and she is without complaints or concerns today.   MS: no gross musculoskeletal defects noted, no edema  SKIN: no suspicious lesions or rashes  NEURO: Normal strength and tone, mentation intact and speech normal  PSYCH: mentation appears normal, affect normal/bright however when we start talking about her stress at home and work she does get tearful.    Diagnostic Test Results:  Labs reviewed in Epic  Results for orders placed or performed in visit on 07/16/20 (from the past 24 hour(s))   Comprehensive metabolic panel (BMP + Alb, Alk Phos, ALT, AST, Total. Bili, TP)   Result Value Ref Range    Sodium 138 133 - 144 mmol/L    Potassium 4.0 3.4 - 5.3 mmol/L    Chloride 106 94 - 109 mmol/L    Carbon Dioxide 29 20 - 32 mmol/L    Anion Gap 3 3 - 14 mmol/L    Glucose 79 70 - 99 mg/dL    Urea " Nitrogen 12 7 - 30 mg/dL    Creatinine 0.80 0.52 - 1.04 mg/dL    GFR Estimate >90 >60 mL/min/[1.73_m2]    GFR Estimate If Black >90 >60 mL/min/[1.73_m2]    Calcium 8.8 8.5 - 10.1 mg/dL    Bilirubin Total 0.2 0.2 - 1.3 mg/dL    Albumin 3.9 3.4 - 5.0 g/dL    Protein Total 7.5 6.8 - 8.8 g/dL    Alkaline Phosphatase 94 40 - 150 U/L    ALT 25 0 - 50 U/L    AST 12 0 - 45 U/L   TSH with free T4 reflex   Result Value Ref Range    TSH 1.46 0.40 - 4.00 mU/L   Lipid panel reflex to direct LDL Fasting   Result Value Ref Range    Cholesterol 201 (H) <200 mg/dL    Triglycerides 161 (H) <150 mg/dL    HDL Cholesterol 47 (L) >49 mg/dL    LDL Cholesterol Calculated 122 (H) <100 mg/dL    Non HDL Cholesterol 154 (H) <130 mg/dL   Albumin Random Urine Quantitative with Creat Ratio   Result Value Ref Range    Creatinine Urine 23 mg/dL    Albumin Urine mg/L <5 mg/L    Albumin Urine mg/g Cr Unable to calculate due to low value 0 - 25 mg/g Cr       ASSESSMENT/PLAN:   (Z00.00) Routine general medical examination at a health care facility  (primary encounter diagnosis)  Comment: She is down to smoking about 12 cigarettes/day and tries to cut down but really has not been able to with the stress in her life.  Plan: Pneumococcal vaccine 23 valent PPSV23          (Pneumovax) [46414], 1st  Administration          [49627]        She is agreeable to getting a Pneumovax today.    (Z23) Need for vaccination  Comment: Recommended 23 valent Pneumovax vaccine.  Plan: Given    (N94.3) PMS (premenstrual syndrome)  Comment: Her symptoms would go along with premenstrual dysphoric syndrome since most of her symptoms are well controlled the other 2 weeks of her cycle.  Plan: FLUoxetine (PROZAC) 10 MG capsule, TSH with         free T4 reflex        We are going to try fluoxetine 10 mg in the evening and after 2 weeks if she needs to she can increase it to 20 mg.  We will get her scheduled for a follow-up appointment in a couple months to see how things are  going.    (O10.03) Benign essential hypertension, postpartum  Comment: Blood pressure is still not well controlled on her current regimen.  She has a strong family history of hypertension.  Plan: hydrochlorothiazide (HYDRODIURIL) 25 MG tablet,        Comprehensive metabolic panel (BMP + Alb, Alk         Phos, ALT, AST, Total. Bili, TP), Albumin         Random Urine Quantitative with Creat Ratio        Virginie increase her medications by adding hydrochlorothiazide to her lisinopril and metoprolol.  Again I stressed the importance of her reducing her weight and increasing her physical activity.  She does have a weight challenge going on at work so is down 8 pounds now and would like to lose a significant amount of weight over the next year.    (Z13.220) Lipid screening  Comment: Due for lipid screening  Plan: Lipid panel reflex to direct LDL Fasting        Lipid panel is reasonable    (R20.2) Paresthesia of foot, bilateral  Comment: Her paresthesias of the feet are little strange and hard to figure out.  If she has been sitting for a while and stands up she has pain in both feet that tends to go away.  It is not a pins-and-needles type of feeling.  Plan: Comprehensive metabolic panel (BMP + Alb, Alk         Phos, ALT, AST, Total. Bili, TP), TSH with free        T4 reflex, Vitamin D Deficiency        All her labs are completely normal.  I told her because the symptoms are persistent but this is less concerning.    (E66.01) Morbid obesity (H)  Comment: Weight gain discontinued although she is now 8# down from her last weight at work.  Plan: We talked about tracking her calories on the [employer] pal elena and then trying to increase her aerobic activity to at least 30 minutes daily.    COUNSELING:   Reviewed preventive health counseling, as reflected in patient instructions       Regular exercise       Healthy diet/nutrition       Vision screening       Immunizations    Vaccinated for: Pneumococcal          Estimated body  "mass index is 42.64 kg/m  as calculated from the following:    Height as of this encounter: 1.666 m (5' 5.6\").    Weight as of this encounter: 118.4 kg (261 lb).    Weight management plan: see above     reports that she has been smoking cigarettes. She has been smoking about 0.50 packs per day. She has never used smokeless tobacco.  Tobacco Cessation Action Plan: Information offered: Patient not interested at this time    Counseling Resources:  ATP IV Guidelines  Pooled Cohorts Equation Calculator  Breast Cancer Risk Calculator  FRAX Risk Assessment  ICSI Preventive Guidelines  Dietary Guidelines for Americans, 2010  USDA's MyPlate  ASA Prophylaxis  Lung CA Screening    Electronically signed by:  Zechariah Mcdonald M.D.  7/17/2020    "

## 2020-07-17 PROBLEM — N94.3 PMS (PREMENSTRUAL SYNDROME): Status: ACTIVE | Noted: 2020-07-17

## 2020-07-17 PROBLEM — R20.2 PARESTHESIA OF FOOT, BILATERAL: Status: ACTIVE | Noted: 2020-07-17

## 2020-07-17 LAB — DEPRECATED CALCIDIOL+CALCIFEROL SERPL-MC: 31 UG/L (ref 20–75)

## 2020-07-17 ASSESSMENT — ANXIETY QUESTIONNAIRES: GAD7 TOTAL SCORE: 4

## 2020-09-02 ENCOUNTER — OFFICE VISIT (OUTPATIENT)
Dept: FAMILY MEDICINE | Facility: CLINIC | Age: 35
End: 2020-09-02
Payer: COMMERCIAL

## 2020-09-02 VITALS
SYSTOLIC BLOOD PRESSURE: 120 MMHG | OXYGEN SATURATION: 100 % | RESPIRATION RATE: 18 BRPM | BODY MASS INDEX: 42.15 KG/M2 | DIASTOLIC BLOOD PRESSURE: 88 MMHG | TEMPERATURE: 97.2 F | HEART RATE: 57 BPM | WEIGHT: 258 LBS

## 2020-09-02 DIAGNOSIS — Z23 NEED FOR PROPHYLACTIC VACCINATION AND INOCULATION AGAINST INFLUENZA: ICD-10-CM

## 2020-09-02 DIAGNOSIS — N94.3 PMS (PREMENSTRUAL SYNDROME): ICD-10-CM

## 2020-09-02 DIAGNOSIS — F41.1 GAD (GENERALIZED ANXIETY DISORDER): ICD-10-CM

## 2020-09-02 PROBLEM — F32.0 MILD MAJOR DEPRESSION (H): Status: RESOLVED | Noted: 2020-09-02 | Resolved: 2020-09-02

## 2020-09-02 PROBLEM — F32.0 MILD MAJOR DEPRESSION (H): Status: ACTIVE | Noted: 2020-09-02

## 2020-09-02 PROCEDURE — 90471 IMMUNIZATION ADMIN: CPT | Performed by: FAMILY MEDICINE

## 2020-09-02 PROCEDURE — 90686 IIV4 VACC NO PRSV 0.5 ML IM: CPT | Performed by: FAMILY MEDICINE

## 2020-09-02 PROCEDURE — 99214 OFFICE O/P EST MOD 30 MIN: CPT | Mod: 25 | Performed by: FAMILY MEDICINE

## 2020-09-02 ASSESSMENT — ANXIETY QUESTIONNAIRES
GAD7 TOTAL SCORE: 6
5. BEING SO RESTLESS THAT IT IS HARD TO SIT STILL: NOT AT ALL
3. WORRYING TOO MUCH ABOUT DIFFERENT THINGS: SEVERAL DAYS
2. NOT BEING ABLE TO STOP OR CONTROL WORRYING: SEVERAL DAYS
7. FEELING AFRAID AS IF SOMETHING AWFUL MIGHT HAPPEN: SEVERAL DAYS
1. FEELING NERVOUS, ANXIOUS, OR ON EDGE: SEVERAL DAYS
6. BECOMING EASILY ANNOYED OR IRRITABLE: SEVERAL DAYS

## 2020-09-02 ASSESSMENT — PATIENT HEALTH QUESTIONNAIRE - PHQ9
SUM OF ALL RESPONSES TO PHQ QUESTIONS 1-9: 2
5. POOR APPETITE OR OVEREATING: SEVERAL DAYS

## 2020-09-02 ASSESSMENT — PAIN SCALES - GENERAL: PAINLEVEL: NO PAIN (0)

## 2020-09-02 NOTE — PROGRESS NOTES
"Subjective     Shari Arshad is a 34 year old female who presents to clinic today for the following health issues:    HPI       Hypertension Follow-up      Do you check your blood pressure regularly outside of the clinic? Yes     Are you following a low salt diet? Yes    Are your blood pressures ever more than 140 on the top number (systolic) OR more   than 90 on the bottom number (diastolic), for example 140/90? No    Anxiety Follow-Up    How are you doing with your anxiety since your last visit? Improved     Are you having other symptoms that might be associated with anxiety? No    Have you had a significant life event? No     Are you feeling depressed? No    Do you have any concerns with your use of alcohol or other drugs? No    Social History     Tobacco Use     Smoking status: Current Every Day Smoker     Packs/day: 0.50     Types: Cigarettes     Smokeless tobacco: Never Used     Tobacco comment: \"Trying to quit\"   Reports 4-6 cig/day   Substance Use Topics     Alcohol use: Yes     Alcohol/week: 0.0 standard drinks     Frequency: Monthly or less     Drinks per session: 1 or 2     Binge frequency: Never     Comment: 2x a month not while pregnant     Drug use: No     LINWOOD-7 SCORE 7/16/2020 7/16/2020 9/2/2020   Total Score 10 4 6     PHQ 7/16/2020 7/16/2020 9/2/2020   PHQ-9 Total Score 3 3 2   Q9: Thoughts of better off dead/self-harm past 2 weeks Not at all Not at all Not at all     Last PHQ-9 9/2/2020   1.  Little interest or pleasure in doing things 0   2.  Feeling down, depressed, or hopeless 0   3.  Trouble falling or staying asleep, or sleeping too much 2   4.  Feeling tired or having little energy 0   5.  Poor appetite or overeating 0   6.  Feeling bad about yourself 0   7.  Trouble concentrating 0   8.  Moving slowly or restless 0   Q9: Thoughts of better off dead/self-harm past 2 weeks 0   PHQ-9 Total Score 2   Difficulty at work, home, or with people Not difficult at all     LINWOOD-7  9/2/2020   1. " Feeling nervous, anxious, or on edge 1   2. Not being able to stop or control worrying 1   3. Worrying too much about different things 1   4. Trouble relaxing 1   5. Being so restless that it is hard to sit still 0   6. Becoming easily annoyed or irritable 1   7. Feeling afraid, as if something awful might happen 1   LINWOOD-7 Total Score 6   If you checked any problems, how difficult have they made it for you to do your work, take care of things at home, or get along with other people? -         How many servings of fruits and vegetables do you eat daily?  4 or more    On average, how many sweetened beverages do you drink each day (Examples: soda, juice, sweet tea, etc.  Do NOT count diet or artificially sweetened beverages)?   3    How many days per week do you exercise enough to make your heart beat faster? 3 or less    How many minutes a day do you exercise enough to make your heart beat faster? 9 or less    How many days per week do you miss taking your medication? 0      Review of Systems   Constitutional, HEENT, cardiovascular, pulmonary, gi and gu systems are negative, except as otherwise noted.      Objective    /88   Pulse 57   Temp 97.2  F (36.2  C) (Temporal)   Resp 18   Wt 117 kg (258 lb)   SpO2 100%   BMI 42.15 kg/m    Body mass index is 42.15 kg/m .  Physical Exam   GENERAL: healthy, alert and no distress  NECK: no adenopathy, no asymmetry, masses, or scars and thyroid normal to palpation  RESP: lungs clear to auscultation - no rales, rhonchi or wheezes  CV: regular rate and rhythm, normal S1 S2, no S3 or S4, no murmur, click or rub, no peripheral edema and peripheral pulses strong  MS: no gross musculoskeletal defects noted, no edema    No labs due.        Assessment & Plan   (O10.03) Benign essential hypertension, postpartum  Comment: stable on her medications.   Plan: no change in medications, labs all WNLs.      (F41.1) LINWOOD  Comment: improved on her medication.   Plan: no change in  medications.     (N94.3) PMS (premenstrual syndrome)  Comment: much improved.  She definitely notes worsening irritability for 3-4 days prior to her menses so will increase her dose for those days and then the remainder of the time stay on the lower dose.    Plan: as noted above.      (Z23) Need for prophylactic vaccination and inoculation against influenza  Comment: requesting a flu shot.   Plan: INFLUENZA VACCINE IM > 6 MONTHS VALENT IIV4         [83914], Vaccine Administration, Initial         [91088]        Given.        Return in about 6 months (around 3/2/2021).    Electronically signed by:  Zechariah Mcdonald M.D.  9/2/2020

## 2020-09-03 ASSESSMENT — ANXIETY QUESTIONNAIRES: GAD7 TOTAL SCORE: 6

## 2020-10-13 DIAGNOSIS — I10 BENIGN ESSENTIAL HYPERTENSION: ICD-10-CM

## 2020-10-14 RX ORDER — LISINOPRIL 20 MG/1
TABLET ORAL
Qty: 90 TABLET | Refills: 1 | Status: SHIPPED | OUTPATIENT
Start: 2020-10-14 | End: 2021-04-19

## 2020-10-14 RX ORDER — METOPROLOL SUCCINATE 100 MG/1
TABLET, EXTENDED RELEASE ORAL
Qty: 90 TABLET | Refills: 1 | Status: SHIPPED | OUTPATIENT
Start: 2020-10-14 | End: 2021-04-19

## 2020-10-14 NOTE — TELEPHONE ENCOUNTER
Routing to covering provider as PCP is currently out of office.     Routing refill request to provider for review/approval because:  Lactation warning.     Britt Baptiste RN

## 2020-11-23 ENCOUNTER — E-VISIT (OUTPATIENT)
Dept: FAMILY MEDICINE | Facility: CLINIC | Age: 35
End: 2020-11-23
Payer: COMMERCIAL

## 2020-11-23 DIAGNOSIS — N30.00 ACUTE CYSTITIS WITHOUT HEMATURIA: Primary | ICD-10-CM

## 2020-11-23 PROCEDURE — 99422 OL DIG E/M SVC 11-20 MIN: CPT | Performed by: FAMILY MEDICINE

## 2020-11-23 RX ORDER — FLUCONAZOLE 150 MG/1
150 TABLET ORAL ONCE
Qty: 1 TABLET | Refills: 0 | Status: SHIPPED | OUTPATIENT
Start: 2020-11-23 | End: 2020-11-23

## 2020-11-23 RX ORDER — PHENAZOPYRIDINE HYDROCHLORIDE 200 MG/1
200 TABLET, FILM COATED ORAL 3 TIMES DAILY PRN
Qty: 15 TABLET | Refills: 0 | Status: SHIPPED | OUTPATIENT
Start: 2020-11-23 | End: 2021-05-20

## 2020-11-23 RX ORDER — CIPROFLOXACIN 250 MG/1
250 TABLET, FILM COATED ORAL 2 TIMES DAILY
Qty: 10 TABLET | Refills: 0 | Status: SHIPPED | OUTPATIENT
Start: 2020-11-23 | End: 2021-05-20

## 2021-01-09 ENCOUNTER — HEALTH MAINTENANCE LETTER (OUTPATIENT)
Age: 36
End: 2021-01-09

## 2021-01-12 DIAGNOSIS — O26.893 HEARTBURN DURING PREGNANCY IN THIRD TRIMESTER: ICD-10-CM

## 2021-01-12 DIAGNOSIS — R12 HEARTBURN DURING PREGNANCY IN THIRD TRIMESTER: ICD-10-CM

## 2021-01-13 NOTE — TELEPHONE ENCOUNTER
Prescription approved per [facility] [facility].    Britt Baptiste RN

## 2021-04-15 DIAGNOSIS — I10 BENIGN ESSENTIAL HYPERTENSION: ICD-10-CM

## 2021-04-19 RX ORDER — METOPROLOL SUCCINATE 100 MG/1
TABLET, EXTENDED RELEASE ORAL
Qty: 90 TABLET | Refills: 1 | Status: SHIPPED | OUTPATIENT
Start: 2021-04-19 | End: 2021-05-20

## 2021-04-19 RX ORDER — LISINOPRIL 20 MG/1
TABLET ORAL
Qty: 90 TABLET | Refills: 1 | Status: SHIPPED | OUTPATIENT
Start: 2021-04-19 | End: 2021-05-20

## 2021-05-20 ENCOUNTER — VIRTUAL VISIT (OUTPATIENT)
Dept: FAMILY MEDICINE | Facility: CLINIC | Age: 36
End: 2021-05-20
Payer: COMMERCIAL

## 2021-05-20 DIAGNOSIS — J01.80 OTHER ACUTE SINUSITIS, RECURRENCE NOT SPECIFIED: Primary | ICD-10-CM

## 2021-05-20 DIAGNOSIS — N94.3 PMS (PREMENSTRUAL SYNDROME): ICD-10-CM

## 2021-05-20 DIAGNOSIS — Z13.220 LIPID SCREENING: ICD-10-CM

## 2021-05-20 DIAGNOSIS — R12 HEARTBURN: ICD-10-CM

## 2021-05-20 DIAGNOSIS — J30.1 SEASONAL ALLERGIC RHINITIS DUE TO POLLEN: ICD-10-CM

## 2021-05-20 DIAGNOSIS — E66.01 MORBID OBESITY (H): ICD-10-CM

## 2021-05-20 DIAGNOSIS — I10 BENIGN ESSENTIAL HYPERTENSION: ICD-10-CM

## 2021-05-20 PROCEDURE — 99214 OFFICE O/P EST MOD 30 MIN: CPT | Mod: GT | Performed by: FAMILY MEDICINE

## 2021-05-20 RX ORDER — CEPHALEXIN 500 MG/1
500 CAPSULE ORAL 2 TIMES DAILY
Qty: 20 CAPSULE | Refills: 0 | Status: SHIPPED | OUTPATIENT
Start: 2021-05-20 | End: 2021-05-30

## 2021-05-20 RX ORDER — FLUOXETINE 10 MG/1
20 CAPSULE ORAL DAILY
Qty: 120 CAPSULE | Refills: 3 | Status: SHIPPED | OUTPATIENT
Start: 2021-05-20 | End: 2021-05-20

## 2021-05-20 RX ORDER — LISINOPRIL 20 MG/1
20 TABLET ORAL DAILY
Qty: 90 TABLET | Refills: 3 | Status: SHIPPED | OUTPATIENT
Start: 2021-05-20 | End: 2022-08-08

## 2021-05-20 RX ORDER — FLUOXETINE 10 MG/1
10 CAPSULE ORAL DAILY
Qty: 90 CAPSULE | Refills: 3 | Status: SHIPPED | OUTPATIENT
Start: 2021-05-20 | End: 2022-06-10

## 2021-05-20 RX ORDER — METOPROLOL SUCCINATE 100 MG/1
100 TABLET, EXTENDED RELEASE ORAL DAILY
Qty: 90 TABLET | Refills: 3 | Status: SHIPPED | OUTPATIENT
Start: 2021-05-20 | End: 2022-05-27

## 2021-05-20 RX ORDER — MONTELUKAST SODIUM 10 MG/1
10 TABLET ORAL AT BEDTIME
Qty: 90 TABLET | Refills: 3 | Status: SHIPPED | OUTPATIENT
Start: 2021-05-20 | End: 2022-06-22

## 2021-05-20 ASSESSMENT — ANXIETY QUESTIONNAIRES
2. NOT BEING ABLE TO STOP OR CONTROL WORRYING: NOT AT ALL
GAD7 TOTAL SCORE: 0
IF YOU CHECKED OFF ANY PROBLEMS ON THIS QUESTIONNAIRE, HOW DIFFICULT HAVE THESE PROBLEMS MADE IT FOR YOU TO DO YOUR WORK, TAKE CARE OF THINGS AT HOME, OR GET ALONG WITH OTHER PEOPLE: NOT DIFFICULT AT ALL
6. BECOMING EASILY ANNOYED OR IRRITABLE: NOT AT ALL
5. BEING SO RESTLESS THAT IT IS HARD TO SIT STILL: NOT AT ALL
7. FEELING AFRAID AS IF SOMETHING AWFUL MIGHT HAPPEN: NOT AT ALL
1. FEELING NERVOUS, ANXIOUS, OR ON EDGE: NOT AT ALL
3. WORRYING TOO MUCH ABOUT DIFFERENT THINGS: NOT AT ALL

## 2021-05-20 ASSESSMENT — PATIENT HEALTH QUESTIONNAIRE - PHQ9: 5. POOR APPETITE OR OVEREATING: NOT AT ALL

## 2021-05-20 NOTE — PROGRESS NOTES
Shari is a 35 year old who is being evaluated via a billable video visit.      How would you like to obtain your AVS? MyChart  If the video visit is dropped, the invitation should be resent by: Text to cell phone: [phone]  Will anyone else be joining your video visit? No    Video Start Time: 15:40 PM    Assessment & Plan   (J01.80) Other acute sinusitis, recurrence not specified  (primary encounter diagnosis)  Comment: Purulent nasal discharge with congestion and sinus pressure persistent now for 3 weeks.  This started out most likely from her seasonal allergies.  Plan: cephALEXin (KEFLEX) 500 MG capsule        I will start her on some cephalexin 500 mg twice daily for 10 days    (J30.1) Seasonal allergic rhinitis due to pollen  Comment: Multiple seasonal allergies due to the trees and grasses blooming right now.  She is on Zyrtec but will add Singulair.  Plan: montelukast (SINGULAIR) 10 MG tablet        Starting Dulera nightly and used throughout this allergy season.     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  She is working 7 AM to 4 PM Monday through Friday has weekends off and evenings often.  Plan: FLUoxetine (PROZAC) 10 MG capsule        She is going to go back down to 110 mg capsule nightly.  Now that the stresses better she does not thinks her anxiety and stress will be as much.  If she undergoes increased stress she can up the dose to 20 mg for short period time and then back it down to 10 mg when things settle down.    (I10) Benign essential hypertension  Comment: Blood pressure has been well controlled even after stopping her hydrochlorothiazide.  Plan: metoprolol succinate ER (TOPROL-XL) 100 MG 24         hr tablet, lisinopril (ZESTRIL) 20 MG tablet        We will continue on her metoprolol and lisinopril.  Refills were sent to the pharmacy for her.  She is due for lab work and that can be  "done anytime within the next 1 to 2 months.  She is due for her yearly and Pap smear in the fall so we could do all of that at the same time.    (R12) Heartburn trimester  Comment: Her heartburn is primarily associated with certain foods and worse during her menses.  Plan: omeprazole (PRILOSEC) 20 MG DR capsule        We will continue to use Prilosec as needed.  Refill was sent.    Regarding her morbid obesity, she has not lost any weight and really is not actively trying to do anything right now.  We did not address this any further.      25 minutes spent on the date of the encounter doing chart review, history and exam, documentation and further activities per the note       BMI:   Estimated body mass index is 42.15 kg/m  as calculated from the following:    Height as of 7/16/20: 1.666 m (5' 5.6\").    Weight as of 9/2/20: 117 kg (258 lb).           Return in about 3 months (around 8/20/2021) for Routine preventive and labs.    Electronically signed by:  Zechariah Mcdonald M.D.  5/20/2021      Cali Mccarthy is a 35 year old who presents for the following health issues     HPI     Hypertension/Anxiety Follow-up      Do you check your blood pressure regularly outside of the clinic? Yes     Are you following a low salt diet? No    Are your blood pressures ever more than 140 on the top number (systolic) OR more   than 90 on the bottom number (diastolic), for example 140/90? No      How many servings of fruits and vegetables do you eat daily?  4 or more    On average, how many sweetened beverages do you drink each day (Examples: soda, juice, sweet tea, etc.  Do NOT count diet or artificially sweetened beverages)?   2    How many days per week do you exercise enough to make your heart beat faster? 3 or less    How many minutes a day do you exercise enough to make your heart beat faster? 9 or less    How many days per week do you miss taking your medication? 0    She has been doing well but had to stop her " "hydrochlorothiazide because her blood pressures were dropping into the 90s over 60s.  She was getting lightheaded nests and ringing in her ears and that would happen.  Continues relapsing for about 2 weeks and then she stopped her hydrochlorothiazide.  Her pressures are now in the 120s over 70s.  She feels much better.    Last concern is that of allergy symptoms with postnasal drip and thick nasal discharge.  Since all the trees and grasses have bloomed her allergies have been terrible.  She was using Claritin without any relief and also just switched over to Zyrtec within the last week.  She has a lot of sinus pressure and her symptoms have been persistent for 3 weeks with the thick nasal discharge.  We did talk about adding Singulair for her allergies and treating her for a sinus infection with antibiotics.    Social History     Tobacco Use     Smoking status: Current Every Day Smoker     Packs/day: 0.50     Types: Cigarettes     Smokeless tobacco: Never Used     Tobacco comment: \"Trying to quit\"   Reports 4-6 cig/day   Substance Use Topics     Alcohol use: Yes     Alcohol/week: 0.0 standard drinks     Frequency: Monthly or less     Drinks per session: 1 or 2     Binge frequency: Never     Comment: 2x a month not while pregnant     Drug use: No     PHQ 7/16/2020 7/16/2020 9/2/2020   PHQ-9 Total Score 3 3 2   Q9: Thoughts of better off dead/self-harm past 2 weeks Not at all Not at all Not at all     LINWOOD-7 SCORE 7/16/2020 9/2/2020 5/20/2021   Total Score 4 6 0     Last PHQ-9 5/20/2021   1.  Little interest or pleasure in doing things 0   2.  Feeling down, depressed, or hopeless 0   3.  Trouble falling or staying asleep, or sleeping too much 0   4.  Feeling tired or having little energy 0   5.  Poor appetite or overeating 0   6.  Feeling bad about yourself 0   7.  Trouble concentrating 0   8.  Moving slowly or restless 0   Q9: Thoughts of better off dead/self-harm past 2 weeks -   PHQ-9 Total Score -   Difficulty at " work, home, or with people Not difficult at all     LINWOOD-7  5/20/2021   1. Feeling nervous, anxious, or on edge 0   2. Not being able to stop or control worrying 0   3. Worrying too much about different things 0   4. Trouble relaxing 0   5. Being so restless that it is hard to sit still 0   6. Becoming easily annoyed or irritable 0   7. Feeling afraid, as if something awful might happen 0   LINWOOD-7 Total Score 0   If you checked any problems, how difficult have they made it for you to do your work, take care of things at home, or get along with other people? Not difficult at all       Suicide Assessment Five-step Evaluation and Treatment (SAFE-T)            Review of Systems   Constitutional, HEENT, cardiovascular, pulmonary, gi and gu systems are negative, except as otherwise noted.      Objective           Vitals:  No vitals were obtained today due to virtual visit.    Physical Exam   GENERAL: healthy, alert, no distress but sounds congested and is coughing during our video visit.  EYES: Eyes grossly normal to inspection.  No discharge or erythema, or obvious scleral/conjunctival abnormalities.  RESP: She sounds congested and is coughing on and off throughout our video visit.  PSYCH: Mentation appears normal, affect normal/bright, judgement and insight intact, normal speech and appearance well-groomed.                Video-Visit Details    Type of service:  Video Visit    Video End Time:4:02 PM    Originating Location (pt. Location): Home    Distant Location (provider location):  [facility]     [facility]

## 2021-05-21 ASSESSMENT — ANXIETY QUESTIONNAIRES: GAD7 TOTAL SCORE: 0

## 2021-08-23 ENCOUNTER — OFFICE VISIT (OUTPATIENT)
Dept: FAMILY MEDICINE | Facility: CLINIC | Age: 36
End: 2021-08-23
Payer: COMMERCIAL

## 2021-08-23 VITALS
OXYGEN SATURATION: 98 % | TEMPERATURE: 97.1 F | HEART RATE: 69 BPM | DIASTOLIC BLOOD PRESSURE: 80 MMHG | BODY MASS INDEX: 43.46 KG/M2 | SYSTOLIC BLOOD PRESSURE: 136 MMHG | WEIGHT: 266 LBS

## 2021-08-23 DIAGNOSIS — Z00.00 ROUTINE GENERAL MEDICAL EXAMINATION AT A HEALTH CARE FACILITY: Primary | ICD-10-CM

## 2021-08-23 DIAGNOSIS — I10 BENIGN ESSENTIAL HYPERTENSION: ICD-10-CM

## 2021-08-23 DIAGNOSIS — N94.3 PMS (PREMENSTRUAL SYNDROME): ICD-10-CM

## 2021-08-23 DIAGNOSIS — B36.9 FUNGAL SKIN INFECTION: ICD-10-CM

## 2021-08-23 DIAGNOSIS — Z11.59 ENCOUNTER FOR HEPATITIS C SCREENING TEST FOR LOW RISK PATIENT: ICD-10-CM

## 2021-08-23 DIAGNOSIS — E66.01 MORBID OBESITY (H): ICD-10-CM

## 2021-08-23 DIAGNOSIS — Z13.220 LIPID SCREENING: ICD-10-CM

## 2021-08-23 LAB
ALBUMIN SERPL-MCNC: 3.8 G/DL (ref 3.4–5)
ALP SERPL-CCNC: 98 U/L (ref 40–150)
ALT SERPL W P-5'-P-CCNC: 25 U/L (ref 0–50)
ANION GAP SERPL CALCULATED.3IONS-SCNC: 4 MMOL/L (ref 3–14)
AST SERPL W P-5'-P-CCNC: 10 U/L (ref 0–45)
BILIRUB SERPL-MCNC: 0.3 MG/DL (ref 0.2–1.3)
BUN SERPL-MCNC: 14 MG/DL (ref 7–30)
CALCIUM SERPL-MCNC: 8.6 MG/DL (ref 8.5–10.1)
CHLORIDE BLD-SCNC: 109 MMOL/L (ref 94–109)
CHOLEST SERPL-MCNC: 225 MG/DL
CO2 SERPL-SCNC: 26 MMOL/L (ref 20–32)
CREAT SERPL-MCNC: 0.93 MG/DL (ref 0.52–1.04)
CREAT UR-MCNC: 146 MG/DL
FASTING STATUS PATIENT QL REPORTED: NO
GFR SERPL CREATININE-BSD FRML MDRD: 79 ML/MIN/1.73M2
GLUCOSE BLD-MCNC: 88 MG/DL (ref 70–99)
HDLC SERPL-MCNC: 42 MG/DL
HOLD SPECIMEN: NORMAL
LDLC SERPL CALC-MCNC: 141 MG/DL
MICROALBUMIN UR-MCNC: 16 MG/L
MICROALBUMIN/CREAT UR: 10.96 MG/G CR (ref 0–25)
NONHDLC SERPL-MCNC: 183 MG/DL
POTASSIUM BLD-SCNC: 3.7 MMOL/L (ref 3.4–5.3)
PROT SERPL-MCNC: 7.4 G/DL (ref 6.8–8.8)
SODIUM SERPL-SCNC: 139 MMOL/L (ref 133–144)
TRIGL SERPL-MCNC: 210 MG/DL
TSH SERPL DL<=0.005 MIU/L-ACNC: 1.62 MU/L (ref 0.4–4)

## 2021-08-23 PROCEDURE — 80061 LIPID PANEL: CPT | Performed by: FAMILY MEDICINE

## 2021-08-23 PROCEDURE — 87624 HPV HI-RISK TYP POOLED RSLT: CPT | Performed by: FAMILY MEDICINE

## 2021-08-23 PROCEDURE — 99395 PREV VISIT EST AGE 18-39: CPT | Performed by: FAMILY MEDICINE

## 2021-08-23 PROCEDURE — 36415 COLL VENOUS BLD VENIPUNCTURE: CPT | Performed by: FAMILY MEDICINE

## 2021-08-23 PROCEDURE — G0145 SCR C/V CYTO,THINLAYER,RESCR: HCPCS | Performed by: FAMILY MEDICINE

## 2021-08-23 PROCEDURE — 99214 OFFICE O/P EST MOD 30 MIN: CPT | Mod: 25 | Performed by: FAMILY MEDICINE

## 2021-08-23 PROCEDURE — 86803 HEPATITIS C AB TEST: CPT | Performed by: FAMILY MEDICINE

## 2021-08-23 PROCEDURE — 84443 ASSAY THYROID STIM HORMONE: CPT | Performed by: FAMILY MEDICINE

## 2021-08-23 PROCEDURE — 80053 COMPREHEN METABOLIC PANEL: CPT | Performed by: FAMILY MEDICINE

## 2021-08-23 PROCEDURE — 82043 UR ALBUMIN QUANTITATIVE: CPT | Performed by: FAMILY MEDICINE

## 2021-08-23 RX ORDER — KETOCONAZOLE 20 MG/G
CREAM TOPICAL 2 TIMES DAILY
Qty: 60 G | Refills: 3 | Status: SHIPPED | OUTPATIENT
Start: 2021-08-23 | End: 2022-12-28

## 2021-08-23 ASSESSMENT — ANXIETY QUESTIONNAIRES
3. WORRYING TOO MUCH ABOUT DIFFERENT THINGS: SEVERAL DAYS
IF YOU CHECKED OFF ANY PROBLEMS ON THIS QUESTIONNAIRE, HOW DIFFICULT HAVE THESE PROBLEMS MADE IT FOR YOU TO DO YOUR WORK, TAKE CARE OF THINGS AT HOME, OR GET ALONG WITH OTHER PEOPLE: NOT DIFFICULT AT ALL
5. BEING SO RESTLESS THAT IT IS HARD TO SIT STILL: NOT AT ALL
GAD7 TOTAL SCORE: 2
2. NOT BEING ABLE TO STOP OR CONTROL WORRYING: NOT AT ALL
6. BECOMING EASILY ANNOYED OR IRRITABLE: SEVERAL DAYS
7. FEELING AFRAID AS IF SOMETHING AWFUL MIGHT HAPPEN: NOT AT ALL
1. FEELING NERVOUS, ANXIOUS, OR ON EDGE: NOT AT ALL

## 2021-08-23 ASSESSMENT — PAIN SCALES - GENERAL: PAINLEVEL: MILD PAIN (3)

## 2021-08-23 ASSESSMENT — PATIENT HEALTH QUESTIONNAIRE - PHQ9
5. POOR APPETITE OR OVEREATING: NOT AT ALL
SUM OF ALL RESPONSES TO PHQ QUESTIONS 1-9: 2

## 2021-08-23 NOTE — PROGRESS NOTES
"   SUBJECTIVE:   CC: Shari Arshad is an 35 year old woman who presents for preventive health visit.       Patient has been advised of split billing requirements and indicates understanding: Yes  Healthy Habits:    Getting at least 3 servings of Calcium per day:  Yes    Bi-annual eye exam:  Yes    Dental care twice a year:  Yes    Sleep apnea or symptoms of sleep apnea:  Excessive snoring    Diet:  Regular (no restrictions)    Frequency of exercise:  None    Duration of exercise:  N/A    Taking medications regularly:  Yes    Barriers to taking medications:  None    Medication side effects:  None    PHQ-2 Total Score:    Additional concerns today:  Yes      PROBLEMS TO ADD ON...  Partially tore her aductor muscle of her left     Today's PHQ-2 Score:   PHQ-2 ( 1999 Pfizer) 7/16/2020   Q1: Little interest or pleasure in doing things 0   Q2: Feeling down, depressed or hopeless 0   PHQ-2 Score 0   Q1: Little interest or pleasure in doing things -   Q2: Feeling down, depressed or hopeless -   PHQ-2 Score -       Abuse: Current or Past (Physical, Sexual or Emotional) - No  Do you feel safe in your environment? Yes    Have you ever done Advance Care Planning? (For example, a Health Directive, POLST, or a discussion with a medical provider or your loved ones about your wishes): No, advance care planning information given to patient to review.  Patient declined advance care planning discussion at this time.    Social History     Tobacco Use     Smoking status: Current Every Day Smoker     Packs/day: 0.50     Types: Cigarettes     Smokeless tobacco: Never Used     Tobacco comment: \"Trying to quit\"   Reports 4-6 cig/day   Substance Use Topics     Alcohol use: Yes     Alcohol/week: 0.0 standard drinks     Comment: 2x a month not while pregnant     If you drink alcohol do you typically have >3 drinks per day or >7 drinks per week? No    No flowsheet data found.    Reviewed orders with patient.  Reviewed health maintenance and " "updated orders accordingly - Yes  Lab work is in process  Labs reviewed in EPIC  BP Readings from Last 3 Encounters:   21 136/80   20 120/88   20 (!) 158/100    Wt Readings from Last 3 Encounters:   21 120.7 kg (266 lb)   20 117 kg (258 lb)   20 118.4 kg (261 lb)                  Patient Active Problem List   Diagnosis     Esophageal reflux     Lactose intolerances     Heartburn     Hypertension goal BP (blood pressure) < 140/90     Benign essential hypertension, postpartum     Morbid obesity (H)     PMS (premenstrual syndrome)     Paresthesia of foot, bilateral     [place]     Past Surgical History:   Procedure Laterality Date      SECTION N/A 2017    Procedure:  SECTION;   Section;  Surgeon: Zechariah Mcdonald MD;  Location: [facility]     HC EXCIS PRIMARY GANGLION WRIST  3/24/2004    Excision, volar wrist ganglion, left.     HC EXCIS RECURRENT GANGLION WRIST  2006    Recurrent volar wrist ganglion, left.     OPEN REDUCTION INTERNAL FIXATION WRIST      Cadavar bone       Social History     Tobacco Use     Smoking status: Current Every Day Smoker     Packs/day: 0.50     Types: Cigarettes     Smokeless tobacco: Never Used     Tobacco comment: \"Trying to quit\"   Reports 4-6 cig/day   Substance Use Topics     Alcohol use: Yes     Alcohol/week: 0.0 standard drinks     Comment: 2x a month not while pregnant     Family History   Problem Relation Age of Onset     Lipids Mother      Musculoskeletal Disorder Mother         ms     Allergies Mother      Depression Mother      Genitourinary Problems Mother      Respiratory Mother         CPAP for sleep apnea     Multiple Sclerosis Mother      Pulmonary Embolism Mother         secondary to flying long periods of time and a recent fracture     Hypertension Father      Lipids Father      Hypertension Paternal Grandmother      Hypertension Paternal Grandfather      Cerebrovascular Disease Maternal Grandmother      " Cancer Maternal Grandmother         ovarian     Gynecology Maternal Grandmother         ovarian ca     Allergies Sister         2nd oldest     Post-Traumatic Stress Disorder (PTSD) Brother         service connected     Depression Brother         4th oldest     Hypertension Brother         3rd oldest     Asthma Sister         oldest     Psychotic Disorder Sister         depression (s/p an amputation at age 3)     Respiratory Sister         CPAP for sleep apnea     Diabetes Sister         adult-onset     Diabetes Maternal Uncle         type 2         Current Outpatient Medications   Medication Sig Dispense Refill     FLUoxetine (PROZAC) 10 MG capsule Take 1 capsule (10 mg) by mouth daily Note dose change, disregard earlier Rx that I sent a bit ago. 90 capsule 3     ketoconazole (NIZORAL) 2 % external cream Apply topically 2 times daily 60 g 3     lisinopril (ZESTRIL) 20 MG tablet Take 1 tablet (20 mg) by mouth daily 90 tablet 3     metoprolol succinate ER (TOPROL-XL) 100 MG 24 hr tablet Take 1 tablet (100 mg) by mouth daily 90 tablet 3     montelukast (SINGULAIR) 10 MG tablet Take 1 tablet (10 mg) by mouth At Bedtime 90 tablet 3     omeprazole (PRILOSEC) 20 MG DR capsule TAKE ONE CAPSULE BY MOUTH ONCE DAILY 90 capsule 3     Allergies   Allergen Reactions     Fruit Extracts Hives     Fruits from trees only if she touches them     Milk [Lac Bovis] GI Disturbance     No Known Drug Allergies      Recent Labs   Lab Test 08/23/21  1738 07/16/20  1429 05/05/20  0942 08/29/17  1404 08/11/16  1438 08/11/16  1438   * 122*  --   --   --  97   HDL 42* 47*  --   --   --  69   TRIG 210* 161*  --   --   --  55   ALT 25 25  --  22  --  28   CR 0.93 0.80 0.82 0.84  --  0.84   GFRESTIMATED 79 >90 >90 78  --  78   GFRESTBLACK  --  >90 >90 >90  --  >90  African American GFR Calc     POTASSIUM 3.7 4.0 3.9  --    < > 3.8   TSH 1.62 1.46  --   --   --  0.85    < > = values in this interval not displayed.        Breast Cancer  Screening:  Any new diagnosis of family breast, ovarian, or bowel cancer? No    FHS-7: No flowsheet data found.    Patient under 40 years of age: Routine Mammogram Screening not recommended.   Pertinent mammograms are reviewed under the imaging tab.    History of abnormal Pap smear: NO - age 30-65 PAP every 5 years with negative HPV co-testing recommended  PAP / HPV Latest Ref Rng & Units 2021   PAP   Negative for Intraepithelial Lesion or Malignancy (NILM) - -   PAP (Historical) - - NIL NIL   HPV16 NEG - Negative -   HPV18 NEG - Negative -   HRHPV NEG - Negative -     Reviewed and updated as needed this visit by clinical staff  Tobacco   Meds              Reviewed and updated as needed this visit by Provider                Past Medical History:   Diagnosis Date     Gastro-oesophageal reflux disease      Heartburn during pregnancy in third trimester 2017     Hypertension      Lactose intolerance      PIH (pregnancy induced hypertension) 2017     Rh(-), needs rhogam at 28 wks gestation 2017     S/P  section 2017      Past Surgical History:   Procedure Laterality Date      SECTION N/A 2017    Procedure:  SECTION;   Section;  Surgeon: Zechariah Mcdonald MD;  Location: [facility]     HC EXCIS PRIMARY GANGLION WRIST  3/24/2004    Excision, volar wrist ganglion, left.     HC EXCIS RECURRENT GANGLION WRIST  2006    Recurrent volar wrist ganglion, left.     OPEN REDUCTION INTERNAL FIXATION WRIST      Cadavar bone     OB History    Para Term  AB Living   1 1 1 0 0 0   SAB TAB Ectopic Multiple Live Births   0 0 0 0 0      # Outcome Date GA Lbr Stevan/2nd Weight Sex Delivery Anes PTL Lv   1 Term 17 38w0d  2.381 kg (5 lb 4 oz) F CS-LTranv Spinal        Name: MEENU LAWSON      Apgar1: 9  Apgar5: 9      Obstetric Comments   EDC 2017 based on LMP.  Parenting to Errol.       Review of Systems  CONSTITUTIONAL: NEGATIVE  for fever, chills, change in weight  INTEGUMENTARY/SKIN: She has 2 lesions on her abdomen one on either side of the midline about 4 to 6 cm from the umbilicus that she thinks is ringworm.  She has been putting some tea tree oil on it which helps minimize the flakiness but it has not cleared it.  EYES: NEGATIVE for vision changes or irritation  ENT: NEGATIVE for ear, mouth and throat problems  RESP: NEGATIVE for significant cough or SOB  BREAST: NEGATIVE for masses, tenderness or discharge  CV: NEGATIVE for chest pain, palpitations or peripheral edema  GI: NEGATIVE for nausea, abdominal pain, heartburn, or change in bowel habits  : NEGATIVE for unusual urinary or vaginal symptoms. Periods are regular.  MUSCULOSKELETAL: NEGATIVE for significant arthralgias or myalgia  NEURO: NEGATIVE for weakness, dizziness or paresthesias  PSYCHIATRIC: NEGATIVE for changes in mood or affect     OBJECTIVE:   /80   Pulse 69   Temp 97.1  F (36.2  C) (Temporal)   Wt 120.7 kg (266 lb)   SpO2 98%   BMI 43.46 kg/m    Physical Exam  GENERAL: healthy, alert and no distress  EYES: Eyes grossly normal to inspection, PERRL and conjunctivae and sclerae normal  HENT: ear canals and TM's normal, nose and mouth without ulcers or lesions  NECK: no adenopathy, no asymmetry, masses, or scars and thyroid normal to palpation  RESP: lungs clear to auscultation - no rales, rhonchi or wheezes  BREAST: No breast exam done, we discussed self breast awareness and what to be concerned about, ie; retraction of a nipple, dimpling of the skin or any nipple discharge or aerolar rash that does not clear.   CV: regular rate and rhythm, normal S1 S2, no S3 or S4, no murmur, click or rub, no peripheral edema and peripheral pulses strong  ABDOMEN: Abdomen is obese soft nontender throughout.  I was sick centimeters on either side of the umbilicus she has a 1 cm oval skin lesion that is flat well demarcated and has a little bit of central sparing.  She has  been applying tea tree oil so it may be covering any flakiness so I do not see any scaling at this point.  This definitely could be a ringworm or just a nummular eczema.   (female): External genitalia is normal in appearance.  Vagina is well rugated with normal discharge.  Cervix is normal in appearance without any lesions.  A Pap smear was obtained without incident.  No bimanual exam was indicated today.  MS: no gross musculoskeletal defects noted, no edema  SKIN: 2 skin lesions as noted above underneath the abdominal exam.  NEURO: Normal strength and tone, mentation intact and speech normal  PSYCH: mentation appears normal, affect normal/bright    Diagnostic Test Results:  Labs reviewed in Epic  Results for orders placed or performed in visit on 08/23/21   TSH with free T4 reflex     Status: Normal   Result Value Ref Range    TSH 1.62 0.40 - 4.00 mU/L   Albumin Random Urine Quantitative with Creat Ratio     Status: None   Result Value Ref Range    Creatinine Urine mg/dL 146 mg/dL    Albumin Urine mg/L 16 mg/L    Albumin Urine mg/g Cr 10.96 0.00 - 25.00 mg/g Cr   Comprehensive metabolic panel (BMP + Alb, Alk Phos, ALT, AST, Total. Bili, TP)     Status: Normal   Result Value Ref Range    Sodium 139 133 - 144 mmol/L    Potassium 3.7 3.4 - 5.3 mmol/L    Chloride 109 94 - 109 mmol/L    Carbon Dioxide (CO2) 26 20 - 32 mmol/L    Anion Gap 4 3 - 14 mmol/L    Urea Nitrogen 14 7 - 30 mg/dL    Creatinine 0.93 0.52 - 1.04 mg/dL    Calcium 8.6 8.5 - 10.1 mg/dL    Glucose 88 70 - 99 mg/dL    Alkaline Phosphatase 98 40 - 150 U/L    AST 10 0 - 45 U/L    ALT 25 0 - 50 U/L    Protein Total 7.4 6.8 - 8.8 g/dL    Albumin 3.8 3.4 - 5.0 g/dL    Bilirubin Total 0.3 0.2 - 1.3 mg/dL    GFR Estimate 79 >60 mL/min/1.73m2   Lipid panel reflex to direct LDL Fasting     Status: Abnormal   Result Value Ref Range    Cholesterol 225 (H) <200 mg/dL    Triglycerides 210 (H) <150 mg/dL    Direct Measure HDL 42 (L) >=50 mg/dL    LDL Cholesterol  Calculated 141 (H) <=100 mg/dL    Non HDL Cholesterol 183 (H) <130 mg/dL    Patient Fasting > 8hrs? No    Hepatitis C antibody     Status: Normal   Result Value Ref Range    Hepatitis C Antibody Nonreactive Nonreactive    Narrative    Assay performance characteristics have not been established for newborns, infants, and children.   Extra Purple Top Tube     Status: None   Result Value Ref Range    Hold Specimen [facility]    Pap screen with HPV - recommended age 30 - 65 years     Status: None   Result Value Ref Range    Interpretation        Negative for Intraepithelial Lesion or Malignancy (NILM)    Specimen Adequacy       Satisfactory for evaluation, endocervical/transformation zone component present    Clinical Information       none      HPV Reflex Yes regardless of result     Previous Abnormal?       No      Performing Labs       The technical component of this testing was completed at [facility]     Extra Tube     Status: None    Narrative    The following orders were created for panel order Extra Tube.  Procedure                               Abnormality         Status                     ---------                               -----------         ------                     Extra Purple Top Tube[[id number]]                            Final result                 Please view results for these tests on the individual orders.       ASSESSMENT/PLAN:   (Z00.00) Routine general medical examination at a health care facility  (primary encounter diagnosis)  Comment: Overall doing well.  She is due for her Pap smear today which all been normal in the past.  Plan: Pap screen with HPV - recommended age 30 - 65         years, HPV Hold (Lab Only), HPV High Risk Types        DNA Cervical        If this Pap smear and HPV are both negative at this time she will have a repeat Pap smear in another 5 years.    (N94.3) PMS (premenstrual syndrome)  Comment: She does get some  PMS that is well controlled with her fluoxetine dose of 10 mg daily.  Plan: Continue current dose.  She is up-to-date as far as refills go.    (E66.01) Morbid obesity (H)  Comment: BMI is 43.46.  This is the heaviest she has been even since pregnancy.  At the beginning of her pregnancy she was 202 pounds 12.8 ounces in January 2017.  Her weight peaked at 249 pounds at the time of delivery but only got down to 239 pounds in June 2018.  Since then she has had steady climb in her weight and now she is at a peak weight of 266 pounds.  Plan: She has joined a gym and wants to get back into a routine of exercise.  She feels that she can still lose this weight on her own so we will give it a little time.  She also would be a candidate for bariatric surgery and we did discuss this today.  I would recommend a gastric sleeve which would help in a number of ways.  It removes the portion of the stomach that secretes the hormone that stimulates hunger and it also makes the stomach significantly smaller so that she cannot eat nearly as much.  This procedure has been very successful.  She will consider this if she is unable to start losing weight on her own again.    (I10) Benign essential hypertension  Comment: Blood pressure has been well controlled on her current dose of medications.  Plan: No change in medication or dosing.    (Z13.220) Lipid screening  Comment: Due for lipid screening  Plan: This was drawn today.  Her total cholesterol is up a little bit as are her LDLs.  If she can start losing weight and those numbers start coming down we can avoid cholesterol medication otherwise we may need to start her on a cholesterol-lowering medication.  We will do a follow-up in 6 months for her blood pressure and weight and discuss this further at that visit.    (Z11.59) Encounter for hepatitis C screening test for low risk patient  Comment: She is agreeable to doing the hepatitis C screen today per the CDC recommendations.  Plan:  "Hepatitis C antibody        Her screen was negative.    (B36.9) Fungal skin infection  Comment: She has a rash suspicious on her abdomen for ringworm.  Is a dermatophyte infection so would need an antifungal medication.  Topical creams are the most effective initially.  Plan: ketoconazole (NIZORAL) 2 % external cream        We will start her on ketoconazole 2% cream twice a day.  I told her that it may take 6 to 8 weeks for it to completely resolve.  If there is absolutely no improvement in this then we would need to do either a biopsy or have her stop the medication and any topical oils for at least 2 weeks we can do a skin scraping at the minimum.      Patient has been advised of split billing requirements and indicates understanding: Yes  COUNSELING:  Reviewed preventive health counseling, as reflected in patient instructions       Regular exercise       Healthy diet/nutrition       Vision screening       Immunizations    Vaccinates are all up-to-date.  She will need a booster for her Pfizer Covid vaccine.  Her second dose was April 16 so she can get that anytime after December 16, 2021.  She is aware of this.  She can schedule this through any of the pharmacies.       Alcohol Use       Contraception       Consider Hep C screening for all patients one time for ages 18-79 years    Estimated body mass index is 43.46 kg/m  as calculated from the following:    Height as of 7/16/20: 1.666 m (5' 5.6\").    Weight as of this encounter: 120.7 kg (266 lb).    Weight management plan: Discussed the referral to a bariatric program to discuss weight loss options.  She wants to hold off on this for now until she can try to lose some weight on her own.    She reports that she has been smoking cigarettes. She has been smoking about 0.50 packs per day. She has never used smokeless tobacco.  Tobacco Cessation Action Plan:   This was not addressed today.  I forgot to discuss her smoking cessation with her.      Counseling " Resources:  ATP IV Guidelines  Pooled Cohorts Equation Calculator  Breast Cancer Risk Calculator  BRCA-Related Cancer Risk Assessment: FHS-7 Tool  FRAX Risk Assessment  ICSI Preventive Guidelines  Dietary Guidelines for Americans, 2010  USDA's MyPlate  ASA Prophylaxis  Lung CA Screening    Electronically signed by:  Zechariah Mcdonald M.D.  8/23/2021

## 2021-08-24 LAB — HCV AB SERPL QL IA: NONREACTIVE

## 2021-08-24 ASSESSMENT — ANXIETY QUESTIONNAIRES: GAD7 TOTAL SCORE: 2

## 2021-08-25 LAB
BKR LAB AP GYN ADEQUACY: NORMAL
BKR LAB AP GYN INTERPRETATION: NORMAL
BKR LAB AP HPV REFLEX: NORMAL
BKR LAB AP PREVIOUS ABNORMAL: NORMAL
PATH REPORT.COMMENTS IMP SPEC: NORMAL
PATH REPORT.RELEVANT HX SPEC: NORMAL

## 2021-08-27 LAB
HUMAN PAPILLOMA VIRUS 16 DNA: NEGATIVE
HUMAN PAPILLOMA VIRUS 18 DNA: NEGATIVE
HUMAN PAPILLOMA VIRUS FINAL DIAGNOSIS: NORMAL
HUMAN PAPILLOMA VIRUS OTHER HR: NEGATIVE

## 2021-10-19 PROBLEM — F32.9 MAJOR DEPRESSION: Status: RESOLVED | Noted: 2020-09-02 | Resolved: 2020-09-02

## 2021-10-23 ENCOUNTER — HEALTH MAINTENANCE LETTER (OUTPATIENT)
Age: 36
End: 2021-10-23

## 2021-11-17 ENCOUNTER — VIRTUAL VISIT (OUTPATIENT)
Dept: INTERNAL MEDICINE | Facility: CLINIC | Age: 36
End: 2021-11-17
Payer: COMMERCIAL

## 2021-11-17 VITALS
SYSTOLIC BLOOD PRESSURE: 154 MMHG | DIASTOLIC BLOOD PRESSURE: 110 MMHG | RESPIRATION RATE: 20 BRPM | TEMPERATURE: 98.4 F | HEART RATE: 68 BPM | BODY MASS INDEX: 42.84 KG/M2 | WEIGHT: 262.2 LBS | OXYGEN SATURATION: 97 %

## 2021-11-17 DIAGNOSIS — J20.9 ACUTE BRONCHITIS WITH SYMPTOMS > 10 DAYS: ICD-10-CM

## 2021-11-17 DIAGNOSIS — F17.200 CURRENT EVERY DAY SMOKER: ICD-10-CM

## 2021-11-17 DIAGNOSIS — J01.90 ACUTE SINUSITIS WITH SYMPTOMS > 10 DAYS: Primary | ICD-10-CM

## 2021-11-17 PROCEDURE — 99213 OFFICE O/P EST LOW 20 MIN: CPT | Performed by: NURSE PRACTITIONER

## 2021-11-17 RX ORDER — PREDNISONE 20 MG/1
40 TABLET ORAL DAILY
Qty: 10 TABLET | Refills: 0 | Status: SHIPPED | OUTPATIENT
Start: 2021-11-17 | End: 2021-11-22

## 2021-11-17 RX ORDER — FLUCONAZOLE 150 MG/1
150 TABLET ORAL ONCE
Qty: 2 TABLET | Refills: 0 | Status: SHIPPED | OUTPATIENT
Start: 2021-11-17 | End: 2021-11-17

## 2021-11-17 RX ORDER — ALBUTEROL SULFATE 90 UG/1
1-2 AEROSOL, METERED RESPIRATORY (INHALATION) EVERY 6 HOURS
Qty: 18 G | Refills: 1 | Status: SHIPPED | OUTPATIENT
Start: 2021-11-17 | End: 2022-12-22

## 2021-11-17 NOTE — PROGRESS NOTES
Assessment & Plan   Problem List Items Addressed This Visit     None      Visit Diagnoses     Acute sinusitis with symptoms > 10 days    -  Primary    Relevant Medications    predniSONE (DELTASONE) 20 MG tablet    amoxicillin-clavulanate (AUGMENTIN) 875-125 MG tablet    fluconazole (DIFLUCAN) 150 MG tablet    albuterol (PROAIR HFA/PROVENTIL HFA/VENTOLIN HFA) 108 (90 Base) MCG/ACT inhaler    Acute bronchitis with symptoms > 10 days        Relevant Medications    predniSONE (DELTASONE) 20 MG tablet    fluconazole (DIFLUCAN) 150 MG tablet    Current every day smoker        Relevant Medications    albuterol (PROAIR HFA/PROVENTIL HFA/VENTOLIN HFA) 108 (90 Base) MCG/ACT inhaler       Patient advised if symptoms persist, worsen or new symptoms arise they are to seek medical care.  All patients questions addressed. Patient verbalized understanding and agreement with plan.           No follow-ups on file.    Laura Short [facility]    Cali Mccarthy is a 36 year old who presents for the following health issues     HPI     Acute Illness  Acute illness concerns: ST, cough with wheeze X 2 weeks  Onset/Duration: 2 weeks  Symptoms:  Fever: no  Chills/Sweats: YES  Headache (location?): YES- front, thinks it's from coughing and lack of sleep  Sinus Pressure: no  Conjunctivitis:  no  Ear Pain: YES- clogged, having trouble hearing  Rhinorrhea: YES  Congestion: YES  Sore Throat: YES  Cough: YES  Wheeze: YES  Decreased Appetite: YES-  Nausea: no  Vomiting: no  Diarrhea: no  Dysuria/Freq.: no  Dysuria or Hematuria: no  Fatigue/Achiness: YES- fatigue  Sick/Strep Exposure: YES- Patient works at a retirement  Therapies tried and outcome: Sudafed am/pm, cough syrup both with no improvement    Review of Systems   Unremarkable other than listed above and below       Objective    BP (!) 154/110 (BP Location: Right arm, Patient Position: Sitting, Cuff Size: Adult Large)   Pulse 68   Temp 98.4  F (36.9  C)  (Oral)   Resp 20   Wt 118.9 kg (262 lb 3.2 oz)   SpO2 97%   BMI 42.84 kg/m    Body mass index is 42.84 kg/m .  Physical Exam   GENERAL: healthy, alert and no distress  HENT: ear canals and TM's normal, nasopharyngeal congestion, bogginess purulent drainage, postnasal drainage is noted  NECK: no adenopathy, no asymmetry, masses, or scars and thyroid normal to palpation  RESP: diffused rhonchii, wheezing noted, respirations regular nonlabored persistent cough throughout exam  CV: regular rate and rhythm, normal S1 S2, no S3 or S4, no murmur, click or rub, no peripheral edema and peripheral pulses strong  PSYCH: mentation appears normal, affect normal/bright

## 2021-12-07 ENCOUNTER — LAB REQUISITION (OUTPATIENT)
Dept: LAB | Facility: CLINIC | Age: 36
End: 2021-12-07

## 2021-12-07 DIAGNOSIS — Z20.822 CONTACT WITH AND (SUSPECTED) EXPOSURE TO COVID-19: ICD-10-CM

## 2021-12-07 PROCEDURE — U0005 INFEC AGEN DETEC AMPLI PROBE: HCPCS | Performed by: FAMILY MEDICINE

## 2021-12-08 LAB — SARS-COV-2 RNA RESP QL NAA+PROBE: NEGATIVE

## 2022-03-01 ENCOUNTER — OFFICE VISIT (OUTPATIENT)
Dept: FAMILY MEDICINE | Facility: CLINIC | Age: 37
End: 2022-03-01
Payer: COMMERCIAL

## 2022-03-01 VITALS
WEIGHT: 262 LBS | SYSTOLIC BLOOD PRESSURE: 130 MMHG | HEART RATE: 72 BPM | RESPIRATION RATE: 16 BRPM | BODY MASS INDEX: 42.81 KG/M2 | OXYGEN SATURATION: 97 % | DIASTOLIC BLOOD PRESSURE: 86 MMHG | TEMPERATURE: 97.3 F

## 2022-03-01 DIAGNOSIS — L98.9 SKIN LESION: ICD-10-CM

## 2022-03-01 DIAGNOSIS — E66.01 MORBID OBESITY (H): ICD-10-CM

## 2022-03-01 DIAGNOSIS — L20.9 ATOPIC DERMATITIS, UNSPECIFIED TYPE: ICD-10-CM

## 2022-03-01 LAB
KOH PREPARATION: NORMAL
KOH PREPARATION: NORMAL

## 2022-03-01 PROCEDURE — 87220 TISSUE EXAM FOR FUNGI: CPT | Performed by: FAMILY MEDICINE

## 2022-03-01 PROCEDURE — 99214 OFFICE O/P EST MOD 30 MIN: CPT | Performed by: FAMILY MEDICINE

## 2022-03-01 RX ORDER — CLOBETASOL PROPIONATE 0.05 MG/G
GEL TOPICAL 2 TIMES DAILY
Qty: 60 G | Refills: 1 | Status: SHIPPED | OUTPATIENT
Start: 2022-03-01 | End: 2024-06-14

## 2022-03-01 ASSESSMENT — PAIN SCALES - GENERAL: PAINLEVEL: NO PAIN (0)

## 2022-03-01 NOTE — PROGRESS NOTES
Assessment & Plan     (O10.03) Benign essential hypertension, postpartum  (primary encounter diagnosis)  Comment: Blood pressure is well controlled on her current regimen.  Plan: No labs are due and we will continue her on her current blood pressure medication including metoprolol 100 mg daily and her lisinopril 20 mg.    (E66.01) Morbid obesity (H)  Comment: Unfortunately she has not lost much weight.  She has some increased stress at home with her  being unemployed and needing back surgery again.  Plan: She reassures me that she knows what to do to lose weight and she will continue to work on it.    (L98.9) Skin lesion  Comment: What we thought was a tinea corporis has not been responding to ketoconazole.  Plan: KOH prep (skin, hair or nails only)        Her KOH today was negative for any fungal elements.  Most likely this is an atopic dermatitis.    (L20.9) Atopic dermatitis, unspecified type  Comment: Skin lesions on her abdomen that have not responded to Nizoral cream is most likely an atopic dermatitis  Plan: clobetasol (TEMOVATE) 0.05 % GEL topical gel        We will try some clobetasol gel topically to be used sparingly twice a day until its starting to fade and then just nightly until it is gone.  She can then use it as needed for recurrent flares      30 minutes spent on the date of the encounter doing chart review, history and exam, documentation and further activities per the note      Return in about 5 months (around 8/1/2022) for Routine preventive and lab work.    Electronically signed by:  Zechariah Mcdonald M.D.  3/1/2022      Cali Mccarthy is a 36 year old who presents for the following health issues    HPI     Hypertension Follow-up      Do you check your blood pressure regularly outside of the clinic? Yes, if feeling off    Are you following a low salt diet? Yes    Are your blood pressures ever more than 140 on the top number (systolic) OR more   than 90 on the bottom number  (diastolic), for example 140/90? No    She has checked her blood pressure at work a few times and it has been a little bit elevated but at home seems to be in a normal range.  She feels better off of the third agent that we had stopped last time she was in.  She has not noticed any increased swelling in her feet.  No shortness of breath or chest pain.  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  Her stress is more at home with her  who has been unemployed for 2 years now due to his back and neck issues.  He is status post a fusion of his neck, 3 vertebrae were fused about a year ago and now having more problems with neck and low back most likely will need more surgery.        Review of Systems   Constitutional, HEENT, cardiovascular, pulmonary, gi and gu systems are negative, except as otherwise noted.      Objective    /86 (BP Location: Right arm, Patient Position: Right side, Cuff Size: Adult Large)   Pulse 72   Temp 97.3  F (36.3  C) (Temporal)   Resp 16   Wt 118.8 kg (262 lb)   LMP 02/18/2022   SpO2 97%   BMI 42.81 kg/m    Body mass index is 42.81 kg/m .  Physical Exam   GENERAL: healthy, alert and no distress  RESP: lungs clear to auscultation - no rales, rhonchi or wheezes  CV: regular rate and rhythm, normal S1 S2, no S3 or S4, no murmur, click or rub, no peripheral edema and peripheral pulses strong  MS:  no edema  Skin: She has 3 small lesions on her abdominal pannus that are about 2 x 1-1/2 cm in diameter.  They are more ovoid in shape with fairly discrete borders.  It is obvious she has had some excoriations of them because they are a little bit darkened but no silvery scaliness to them.  There is a little bit of central scaliness to one of the lesions.  I did do a scraping of all 3 lesions for a KOH.    Results for orders placed or performed in visit on 03/01/22 (from the  past 24 hour(s))   KOH prep (skin, hair or nails only)    Specimen: Abdomen; Skin   Result Value Ref Range    KOH Preparation No fungal elements seen     KOH Preparation Reference Range: No fungal elements seen.                 4000

## 2022-05-26 DIAGNOSIS — I10 BENIGN ESSENTIAL HYPERTENSION: ICD-10-CM

## 2022-05-27 RX ORDER — METOPROLOL SUCCINATE 100 MG/1
TABLET, EXTENDED RELEASE ORAL
Qty: 90 TABLET | Refills: 3 | Status: SHIPPED | OUTPATIENT
Start: 2022-05-27 | End: 2023-07-21

## 2022-06-08 DIAGNOSIS — N94.3 PMS (PREMENSTRUAL SYNDROME): ICD-10-CM

## 2022-06-10 RX ORDER — FLUOXETINE 10 MG/1
CAPSULE ORAL
Qty: 90 CAPSULE | Refills: 3 | Status: SHIPPED | OUTPATIENT
Start: 2022-06-10 | End: 2023-06-07

## 2022-06-21 DIAGNOSIS — J30.1 SEASONAL ALLERGIC RHINITIS DUE TO POLLEN: ICD-10-CM

## 2022-06-22 RX ORDER — MONTELUKAST SODIUM 10 MG/1
TABLET ORAL
Qty: 90 TABLET | Refills: 2 | Status: SHIPPED | OUTPATIENT
Start: 2022-06-22 | End: 2023-08-09

## 2022-07-06 NOTE — MR AVS SNAPSHOT
After Visit Summary   3/28/2017    Shari Arshad    MRN: [id number]           Patient Information     Date Of Birth          1985        Visit Information        Provider Department      3/28/2017 3:40 PM Pratibha Merino MD [facility]        Today's Diagnoses     Prenatal care, second trimester    -  1    Gastroesophageal reflux disease without esophagitis           Follow-ups after your visit        Follow-up notes from your care team     Return in about 4 weeks (around 4/25/2017).      Your next 10 appointments already scheduled     Apr 25, 2017 11:00 AM CDT   US OB > 14 WEEKS COMPLETE SINGLE with PHUS1   [facility] ([facility])    [address] [place] [address] [id number] [phone]           Please bring a list of your medicines (including vitamins, minerals and over-the-counter drugs). Also, tell your doctor about any allergies you may have. Wear comfortable clothes and leave your valuables at home.  If you re less than 20 weeks drink four 8-ounce glasses of fluid an hour before your exam. If you need to empty your bladder before your exam, try to release only a little urine. Then, drink another glass of fluid.  You may have up to two family members in the exam room. If you bring a small child, an adult must be there to care for him or her.  Please call the Imaging Department at your exam site with any questions.            Apr 25, 2017  1:00 PM CDT   ESTABLISHED PRENATAL with Zechariah Mcdonald MD   [facility]    [address] [facility] [id number] [phone]              Who to contact     If you have questions or need follow up information about today's clinic visit or your schedule please contact [facility] directly at [phone].  Normal or non-critical lab and imaging results will be communicated to you by MyChart, letter or phone within 4 business days  Patient was at the [facility] ED on 7/5 due to tongue swelling. ER doctor thought the reaction may have been from Benazepril medication. Patient was taken off of medication and told to advise PCP. Patient has aklso been having tremors since ED visit due to medications he was given at visit.      after the clinic has received the results. If you do not hear from us within 7 days, please contact the clinic through [employer] or phone. If you have a critical or abnormal lab result, we will notify you by phone as soon as possible.  Submit refill requests through [employer] or call your pharmacy and they will forward the refill request to us. Please allow 3 business days for your refill to be completed.          Additional Information About Your Visit        [employer] Information     [employer] gives you secure access to your electronic health record. If you see a primary care provider, you can also send messages to your care team and make appointments. If you have questions, please call your primary care clinic.  If you do not have a primary care provider, please call [phone] and they will assist you.        Care EveryWhere ID     This is your Care EveryWhere ID. This could be used by other organizations to access your [place] medical records  [id number]        Your Vitals Were     Pulse Temperature Respirations Last Period Pulse Oximetry BMI (Body Mass Index)    69 97.3  F (36.3  C) (Temporal) 14 12/06/2016 (Exact Date) 96% 35.61 kg/m2       Blood Pressure from Last 3 Encounters:   03/28/17 120/72   02/20/17 117/80   01/06/17 132/78    Weight from Last 3 Encounters:   03/28/17 214 lb (97.1 kg)   02/20/17 212 lb (96.2 kg)   01/09/17 205 lb (93 kg)                 Today's Medication Changes          These changes are accurate as of: 3/28/17 11:59 PM.  If you have any questions, ask your nurse or doctor.               Start taking these medicines.        Dose/Directions    ranitidine 150 MG tablet   Commonly known as:  ZANTAC   Used for:  Prenatal care, second trimester   Started by:  Pratibha Merino MD        Dose:  150 mg   Take 1 tablet (150 mg) by mouth 2 times daily   Quantity:  60 tablet   Refills:  3            Where to get your medicines      These medications were sent to [facility]  [place], [place] - [address]   [address] , [facility] [address]     Phone:  [phone]     ranitidine 150 MG tablet                Primary Care Provider    None Specified       No primary provider on file.        Thank you!     Thank you for choosing [facility]  for your care. Our goal is always to provide you with excellent care. Hearing back from our patients is one way we can continue to improve our services. Please take a few minutes to complete the written survey that you may receive in the mail after your visit with us. Thank you!             Your Updated Medication List - Protect others around you: Learn how to safely use, store and throw away your medicines at www.disposemymeds.org.          This list is accurate as of: 3/28/17 11:59 PM.  Always use your most recent med list.                   Brand Name Dispense Instructions for use    prenatal multivitamin  plus iron 27-0.8 MG Tabs per tablet     100 tablet    Take 1 tablet by mouth daily       ranitidine 150 MG tablet    ZANTAC    60 tablet    Take 1 tablet (150 mg) by mouth 2 times daily

## 2022-08-03 DIAGNOSIS — E78.2 MIXED HYPERLIPIDEMIA: Primary | ICD-10-CM

## 2022-08-03 DIAGNOSIS — R12 HEARTBURN: ICD-10-CM

## 2022-08-03 DIAGNOSIS — I10 BENIGN ESSENTIAL HYPERTENSION: ICD-10-CM

## 2022-08-03 NOTE — TELEPHONE ENCOUNTER
Zestril  Routing refill request to provider for review/approval because:  A break in medication    Prilosec  Routing refill request to provider for review/approval because:  A break in medication    Leola Nation RN

## 2022-08-08 ENCOUNTER — MYC MEDICAL ADVICE (OUTPATIENT)
Dept: FAMILY MEDICINE | Facility: CLINIC | Age: 37
End: 2022-08-08

## 2022-08-08 RX ORDER — LISINOPRIL 20 MG/1
TABLET ORAL
Qty: 90 TABLET | Refills: 3 | Status: SHIPPED | OUTPATIENT
Start: 2022-08-08 | End: 2023-08-16

## 2022-08-08 NOTE — TELEPHONE ENCOUNTER
Due for lab work.  Orders were placed.  Please schedule lab appointment.     Electronically signed by:  Zechariah Mcdonald M.D.  8/8/2022

## 2022-08-14 ENCOUNTER — LAB (OUTPATIENT)
Dept: LAB | Facility: CLINIC | Age: 37
End: 2022-08-14
Payer: COMMERCIAL

## 2022-08-14 DIAGNOSIS — E78.2 MIXED HYPERLIPIDEMIA: ICD-10-CM

## 2022-08-14 DIAGNOSIS — I10 BENIGN ESSENTIAL HYPERTENSION: ICD-10-CM

## 2022-08-14 LAB
ALBUMIN SERPL-MCNC: 3.8 G/DL (ref 3.4–5)
ALP SERPL-CCNC: 89 U/L (ref 40–150)
ALT SERPL W P-5'-P-CCNC: 18 U/L (ref 0–50)
ANION GAP SERPL CALCULATED.3IONS-SCNC: 5 MMOL/L (ref 3–14)
AST SERPL W P-5'-P-CCNC: 9 U/L (ref 0–45)
BILIRUB SERPL-MCNC: 0.4 MG/DL (ref 0.2–1.3)
BUN SERPL-MCNC: 14 MG/DL (ref 7–30)
CALCIUM SERPL-MCNC: 8.7 MG/DL (ref 8.5–10.1)
CHLORIDE BLD-SCNC: 108 MMOL/L (ref 94–109)
CHOLEST SERPL-MCNC: 209 MG/DL
CO2 SERPL-SCNC: 26 MMOL/L (ref 20–32)
CREAT SERPL-MCNC: 0.74 MG/DL (ref 0.52–1.04)
CREAT UR-MCNC: 100 MG/DL
FASTING STATUS PATIENT QL REPORTED: YES
GFR SERPL CREATININE-BSD FRML MDRD: >90 ML/MIN/1.73M2
GLUCOSE BLD-MCNC: 91 MG/DL (ref 70–99)
HDLC SERPL-MCNC: 46 MG/DL
LDLC SERPL CALC-MCNC: 133 MG/DL
MICROALBUMIN UR-MCNC: 13 MG/L
MICROALBUMIN/CREAT UR: 13 MG/G CR (ref 0–25)
NONHDLC SERPL-MCNC: 163 MG/DL
POTASSIUM BLD-SCNC: 4 MMOL/L (ref 3.4–5.3)
PROT SERPL-MCNC: 7 G/DL (ref 6.8–8.8)
SODIUM SERPL-SCNC: 139 MMOL/L (ref 133–144)
TRIGL SERPL-MCNC: 148 MG/DL

## 2022-08-14 PROCEDURE — 80053 COMPREHEN METABOLIC PANEL: CPT

## 2022-08-14 PROCEDURE — 82043 UR ALBUMIN QUANTITATIVE: CPT

## 2022-08-14 PROCEDURE — 36415 COLL VENOUS BLD VENIPUNCTURE: CPT

## 2022-08-14 PROCEDURE — 80061 LIPID PANEL: CPT

## 2022-10-09 ENCOUNTER — HEALTH MAINTENANCE LETTER (OUTPATIENT)
Age: 37
End: 2022-10-09

## 2022-12-22 ENCOUNTER — OFFICE VISIT (OUTPATIENT)
Dept: INTERNAL MEDICINE | Facility: CLINIC | Age: 37
End: 2022-12-22
Payer: COMMERCIAL

## 2022-12-22 VITALS
HEIGHT: 66 IN | WEIGHT: 259.5 LBS | BODY MASS INDEX: 41.71 KG/M2 | RESPIRATION RATE: 18 BRPM | TEMPERATURE: 97.4 F | SYSTOLIC BLOOD PRESSURE: 142 MMHG | OXYGEN SATURATION: 98 % | DIASTOLIC BLOOD PRESSURE: 110 MMHG | HEART RATE: 62 BPM

## 2022-12-22 DIAGNOSIS — R10.84 ABDOMINAL PAIN, GENERALIZED: ICD-10-CM

## 2022-12-22 DIAGNOSIS — F17.200 CURRENT EVERY DAY SMOKER: ICD-10-CM

## 2022-12-22 DIAGNOSIS — J01.00 ACUTE NON-RECURRENT MAXILLARY SINUSITIS: ICD-10-CM

## 2022-12-22 DIAGNOSIS — R19.7 DIARRHEA OF PRESUMED INFECTIOUS ORIGIN: Primary | ICD-10-CM

## 2022-12-22 LAB
ERYTHROCYTE [DISTWIDTH] IN BLOOD BY AUTOMATED COUNT: 12.6 % (ref 10–15)
HCT VFR BLD AUTO: 46.2 % (ref 35–47)
HGB BLD-MCNC: 15.3 G/DL (ref 11.7–15.7)
MCH RBC QN AUTO: 30.2 PG (ref 26.5–33)
MCHC RBC AUTO-ENTMCNC: 33.1 G/DL (ref 31.5–36.5)
MCV RBC AUTO: 91 FL (ref 78–100)
PLATELET # BLD AUTO: 272 10E3/UL (ref 150–450)
RBC # BLD AUTO: 5.06 10E6/UL (ref 3.8–5.2)
WBC # BLD AUTO: 12 10E3/UL (ref 4–11)

## 2022-12-22 PROCEDURE — 36415 COLL VENOUS BLD VENIPUNCTURE: CPT | Performed by: INTERNAL MEDICINE

## 2022-12-22 PROCEDURE — 99214 OFFICE O/P EST MOD 30 MIN: CPT | Performed by: INTERNAL MEDICINE

## 2022-12-22 PROCEDURE — 85027 COMPLETE CBC AUTOMATED: CPT | Performed by: INTERNAL MEDICINE

## 2022-12-22 RX ORDER — AMOXICILLIN AND CLAVULANATE POTASSIUM 500; 125 MG/1; MG/1
1 TABLET, FILM COATED ORAL 2 TIMES DAILY
Qty: 14 TABLET | Refills: 0 | Status: SHIPPED | OUTPATIENT
Start: 2022-12-22 | End: 2023-04-04

## 2022-12-22 RX ORDER — ALBUTEROL SULFATE 90 UG/1
1-2 AEROSOL, METERED RESPIRATORY (INHALATION) EVERY 6 HOURS
Qty: 18 G | Refills: 1 | Status: SHIPPED | OUTPATIENT
Start: 2022-12-22

## 2022-12-22 ASSESSMENT — PAIN SCALES - GENERAL: PAINLEVEL: WORST PAIN (10)

## 2022-12-22 NOTE — PROGRESS NOTES
Cali Mccarthy is a 37 year old, presenting for the following health issues:  Abdominal Pain      History of Present Illness       Reason for visit:  Intermitten pain in my lower abdomin not related to menstration  Symptom onset:  More than a month  Symptoms include:  Pain , dabilitating discomfort,nausa,  Symptom intensity:  Severe  Symptom progression:  Staying the same  Had these symptoms before:  No  What makes it worse:  Not eating, eating,dehidration  What makes it better:  Laying down,not doing any activity    She eats 2-3 servings of fruits and vegetables daily.She consumes 0 sweetened beverage(s) daily.She exercises with enough effort to increase her heart rate 20 to 29 minutes per day.  She exercises with enough effort to increase her heart rate 3 or less days per week.   She is taking medications regularly.          EMR reviewed including:             Complaint, History of Chief Complaint, Corresponding Review of Systems, and Complaint Specific Physical Examination.    #1   Intermittent diarrhea and abdominal pain for several months.  Last for several days and then will toño.  Not always associated with diarrhea.  Denies constipation.  Is knowingly lactose sensitive and avoids it.  Has tried gluten-free diet with no benefit.  Has both father and a sister with inflammatory bowel disease.  Notes increased fatigue secondary to the chronic abdominal discomfort.        Exam:  Abdomen soft without rebound, rigidity or guarding.  Tenderness in the lower abdomen bilaterally is noted.   LUNGS: clear bilaterally, airflow is brisk, no intercostal retraction or stridor is noted. No coughing is noted during visit.   HEART:  regular without rubs, clicks, gallops, or murmurs. PMI is nondisplaced. Upstrokes are brisk. S1,S2 are heard.      #2   Sinus pressure, posterior nasal drainage,  Notes the drainage is quite purulent and yellow to green.  Denies fevers or chills.  Taking food and fluid adequately.  Has  "history of frequent sinus infections.        Exam:   ENT: Pharynx is mildly-erythemous, moderate/green PND, there is significant nasal congestion, TM's not red or retracted, hearing intact bilaterally. No carotid bruits are heard. No JVD seen. Thyroid is not nodular or enlarged.   Constitutional: The patient appears to be in no acute distress. The patient appears to be adequately hydrated. No acute respiratory or hemodynamic distress is noted at this time.        Patient has been interviewed, applicable history and applied review of systems have been performed.    Vital Signs:   BP (!) 142/110 (BP Location: Right arm, Patient Position: Sitting, Cuff Size: Adult Large)   Pulse 62   Temp 97.4  F (36.3  C) (Temporal)   Resp 18   Ht 1.676 m (5' 6\")   Wt 117.7 kg (259 lb 8 oz)   LMP 11/13/2022   SpO2 98%   BMI 41.88 kg/m        Decision Making    Problem and Complexity     1. Abdominal pain, generalized  No signs of acute abdomen at this time.    2. Diarrhea of presumed infectious origin  Rule out infectious,  We will set up colonoscopy as patient a strong family history of inflammatory bowel disease.  Check CBC/white count for leukocytosis    - CBC with platelets; Future  - C. difficile Toxin B PCR with reflex to C. difficile Antigen and Toxins A/B EIA; Future  - Enteric Bacteria and Virus Panel by [facility] Stool; Future  - Ova and Parasite Exam Routine; Future  - Adult GI  Referral - Procedure Only; Future  - CBC with platelets  - C. difficile Toxin B PCR with reflex to C. difficile Antigen and Toxins A/B EIA  - Enteric Bacteria and Virus Panel by [facility] Stool  - Ova and Parasite Exam Routine    3. Acute non-recurrent maxillary sinusitis  Start antibiotic.  Tylenol, fluids, rest  - CBC with platelets; Future  - amoxicillin-clavulanate (AUGMENTIN) 500-125 MG tablet; Take 1 tablet by mouth 2 times daily  Dispense: 14 tablet; Refill: 0  - CBC with platelets    4. Current every day smoker  Recommend smoking " cessation.  Patient will use albuterol on as-needed basis.  - albuterol (PROAIR HFA/PROVENTIL HFA/VENTOLIN HFA) 108 (90 Base) MCG/ACT inhaler; Inhale 1-2 puffs into the lungs every 6 hours  Dispense: 18 g; Refill: 1                                FOLLOW UP   I have asked the patient to make an appointment for followup with either:  1.  Me,  2.  The patient's preferred provider,  3.  Any available provider  In 1 month        Regarding routine vaccinations:  I have reviewed the patient's vaccination schedule and discussed the benefits of prophylactic vaccination in detail.  I recommend the patient contact their pharmacist (not the pharmacy within the clinic) for vaccinations.  Discussed that most insurance companies now favor reimbursement to the pharmacies and it will financially behoove the patient to have vaccinations performed at their pharmacy.        I have carefully explained the diagnosis and treatment options to the patient.  The patient has displayed an understanding of the above, and all subsequent questions were answered.      DO ASHLEY Kline    Portions of this note were produced using [employer]  Although every attempt at real-time proof reading has been made, occasional grammar/syntax errors may have been missed.

## 2022-12-23 ENCOUNTER — TELEPHONE (OUTPATIENT)
Dept: GASTROENTEROLOGY | Facility: CLINIC | Age: 37
End: 2022-12-23

## 2022-12-23 NOTE — TELEPHONE ENCOUNTER
Screening Questions  BLUE  KIND OF PREP RED  LOCATION [review exclusion criteria] GREEN  SEDATION TYPE        Y Are you active on mychart?       Alfonso Milner, DO Ordering/Referring Provider?        [facility] What type of coverage do you have?      N Have you had a positive covid test in the last 14 days?     41.8 1. BMI  [BMI 40+ - review exclusion criteria]    Y  2. Are you able to give consent for your medical care? [IF NO,RN REVIEW]        N  3. Are you taking any prescription pain medications on a routine schedule?        3a. EXTENDED PREP What kind of prescription?     N 4. Do you have any chemical dependencies such as alcohol, street drugs, or methadone?    N 5. Do you have any history of post-traumatic stress syndrome, severe anxiety or history of psychosis?      **If yes 3- 5 , please schedule with MAC sedation.**          IF YES TO ANY 6 - 10 - HOSPITAL SETTING ONLY.     N 6.   Do you need assistance transferring?     N 7.   Have you had a heart or lung transplant?    N 8.   Are you currently on dialysis?   N 9.   Do you use daily home oxygen?   N 10. Do you take nitroglycerin?   10a.  If yes, how often?     11. [FEMALES]   Are you currently pregnant?    11a.  If yes, how many weeks? [ Greater than 12 weeks, OR NEEDED]    N 12. Do you have Pulmonary Hypertension? *NEED PAC APPT AT [facility]*     N 13. [review exclusion criteria]  Do you have any implantable devices in your body (pacemaker, defib, LVAD)?    N 14. In the past 6 months, have you had any heart related issues including cardiomyopathy or heart attack?     14a.  If yes, did it require cardiac stenting if so when?     N 15. Have you had a stroke or Transient ischemic attack (TIA - aka  mini stroke ) within 6 months?      N 16. Do you have mod to severe Obstructive Sleep Apnea?  [Hospital only - Ok at [place]]    N 17. Do you have SEVERE AND UNCONTROLLED asthma? *NEED PAC APPT AT [facility]*     N 18. Are you currently taking any  "blood thinners?     18a. If yes, inform patient to \"follow up w/ ordering provider for bridging instructions.\"    N 19. Do you take the medication Phentermine?    19a. If yes, \"Hold for 7 days before procedure.  Please consult your prescribing provider if you have questions about holding this medication.\"     N  20. Do you have chronic kidney disease?      N  21. Do you have a diagnosis of diabetes?     N  22. On a regular basis do you go 3-5 days between bowel movements?      23. Preferred LOCAL Pharmacy for Pre Prescription    [ LIST ONLY ONE PHARMACY]        [facility] - [place], [place] - [address]        - CLOSING REMINDERS -    Informed patient they will need an adult    Cannot take any type of public or medical transportation alone    Conscious Sedation- Needs  for 6 hours after the procedure       MAC/General-Needs  for 24 hours after procedure    Pre-Procedure Covid test to be completed [[facility] PCR Testing Required]    Confirmed Nurse will call to complete assessment       - SCHEDULING DETAILS -  no Hospital Setting Required? If yes, what is the exclusion?: n/a  Amira  Surgeon    01/02/23  Date of Procedure  Lower Endoscopy [Colonoscopy]  Type of Procedure Scheduled  [facility] Location   GOLYTELY EXTENDED - If you answer yes to questions #1, #3, #22 (Andrew Wolf and CF pts)Which Colonoscopy Prep was Sent?  BMI over 40   MAC Sedation Type     N PAC / Pre-op Required                 "

## 2022-12-30 ENCOUNTER — ANESTHESIA EVENT (OUTPATIENT)
Dept: GASTROENTEROLOGY | Facility: CLINIC | Age: 37
End: 2022-12-30
Payer: COMMERCIAL

## 2022-12-30 LAB — C DIFF TOX B STL QL: NEGATIVE

## 2022-12-30 PROCEDURE — 87209 SMEAR COMPLEX STAIN: CPT | Performed by: INTERNAL MEDICINE

## 2022-12-30 PROCEDURE — 87506 IADNA-DNA/RNA PROBE TQ 6-11: CPT | Performed by: INTERNAL MEDICINE

## 2022-12-30 PROCEDURE — 87177 OVA AND PARASITES SMEARS: CPT | Performed by: INTERNAL MEDICINE

## 2023-01-02 ENCOUNTER — ANESTHESIA (OUTPATIENT)
Dept: GASTROENTEROLOGY | Facility: CLINIC | Age: 38
End: 2023-01-02
Payer: COMMERCIAL

## 2023-01-02 ENCOUNTER — SURGERY (OUTPATIENT)
Age: 38
End: 2023-01-02
Payer: COMMERCIAL

## 2023-01-02 ENCOUNTER — HOSPITAL ENCOUNTER (OUTPATIENT)
Facility: CLINIC | Age: 38
Discharge: HOME | End: 2023-01-02
Attending: SURGERY | Admitting: SURGERY
Payer: COMMERCIAL

## 2023-01-02 VITALS
SYSTOLIC BLOOD PRESSURE: 101 MMHG | DIASTOLIC BLOOD PRESSURE: 76 MMHG | OXYGEN SATURATION: 96 % | RESPIRATION RATE: 16 BRPM | HEART RATE: 61 BPM

## 2023-01-02 LAB
COLONOSCOPY: NORMAL
O+P STL MICRO: NEGATIVE

## 2023-01-02 PROCEDURE — 88305 TISSUE EXAM BY PATHOLOGIST: CPT | Mod: 26 | Performed by: PATHOLOGY

## 2023-01-02 PROCEDURE — 88305 TISSUE EXAM BY PATHOLOGIST: CPT | Mod: TC | Performed by: SURGERY

## 2023-01-02 PROCEDURE — 250N000011 HC RX IP 250 OP 636: Performed by: NURSE ANESTHETIST, CERTIFIED REGISTERED

## 2023-01-02 PROCEDURE — 45380 COLONOSCOPY AND BIOPSY: CPT | Performed by: SURGERY

## 2023-01-02 PROCEDURE — 250N000009 HC RX 250: Performed by: SURGERY

## 2023-01-02 PROCEDURE — 250N000009 HC RX 250: Performed by: NURSE ANESTHETIST, CERTIFIED REGISTERED

## 2023-01-02 PROCEDURE — 45380 COLONOSCOPY AND BIOPSY: CPT | Performed by: FAMILY MEDICINE

## 2023-01-02 PROCEDURE — 370N000017 HC ANESTHESIA TECHNICAL FEE, PER MIN: Performed by: SURGERY

## 2023-01-02 PROCEDURE — 258N000003 HC RX IP 258 OP 636: Performed by: NURSE ANESTHETIST, CERTIFIED REGISTERED

## 2023-01-02 RX ORDER — ONDANSETRON 2 MG/ML
4 INJECTION INTRAMUSCULAR; INTRAVENOUS
Status: DISCONTINUED | OUTPATIENT
Start: 2023-01-02 | End: 2023-01-02 | Stop reason: HOSPADM

## 2023-01-02 RX ORDER — LIDOCAINE HYDROCHLORIDE 20 MG/ML
INJECTION, SOLUTION INFILTRATION; PERINEURAL PRN
Status: DISCONTINUED | OUTPATIENT
Start: 2023-01-02 | End: 2023-01-02

## 2023-01-02 RX ORDER — LIDOCAINE 40 MG/G
CREAM TOPICAL
Status: DISCONTINUED | OUTPATIENT
Start: 2023-01-02 | End: 2023-01-02 | Stop reason: HOSPADM

## 2023-01-02 RX ORDER — PROPOFOL 10 MG/ML
INJECTION, EMULSION INTRAVENOUS PRN
Status: DISCONTINUED | OUTPATIENT
Start: 2023-01-02 | End: 2023-01-02

## 2023-01-02 RX ORDER — PROPOFOL 10 MG/ML
INJECTION, EMULSION INTRAVENOUS CONTINUOUS PRN
Status: DISCONTINUED | OUTPATIENT
Start: 2023-01-02 | End: 2023-01-02

## 2023-01-02 RX ORDER — SODIUM CHLORIDE, SODIUM LACTATE, POTASSIUM CHLORIDE, CALCIUM CHLORIDE 600; 310; 30; 20 MG/100ML; MG/100ML; MG/100ML; MG/100ML
INJECTION, SOLUTION INTRAVENOUS CONTINUOUS PRN
Status: DISCONTINUED | OUTPATIENT
Start: 2023-01-02 | End: 2023-01-02

## 2023-01-02 RX ADMIN — LIDOCAINE HYDROCHLORIDE 50 MG: 20 INJECTION, SOLUTION INFILTRATION; PERINEURAL at 10:35

## 2023-01-02 RX ADMIN — SODIUM CHLORIDE, POTASSIUM CHLORIDE, SODIUM LACTATE AND CALCIUM CHLORIDE: 600; 310; 30; 20 INJECTION, SOLUTION INTRAVENOUS at 10:35

## 2023-01-02 RX ADMIN — LIDOCAINE HYDROCHLORIDE 1 ML: 10 INJECTION, SOLUTION EPIDURAL; INFILTRATION; INTRACAUDAL; PERINEURAL at 09:15

## 2023-01-02 RX ADMIN — PROPOFOL 150 MCG/KG/MIN: 10 INJECTION, EMULSION INTRAVENOUS at 10:36

## 2023-01-02 RX ADMIN — PROPOFOL 50 MG: 10 INJECTION, EMULSION INTRAVENOUS at 10:35

## 2023-01-02 ASSESSMENT — ACTIVITIES OF DAILY LIVING (ADL)
ADLS_ACUITY_SCORE: 35
ADLS_ACUITY_SCORE: 33

## 2023-01-02 ASSESSMENT — LIFESTYLE VARIABLES: TOBACCO_USE: 1

## 2023-01-02 NOTE — DISCHARGE INSTRUCTIONS
[facility]    Home Care Following Endoscopy          Activity:  You have just undergone an endoscopic procedure usually performed with conscious sedation.  Do not work or operate machinery (including a car) for at least 12 hours.    I encourage you to walk and attempt to pass this air as soon as possible.    Diet:  Return to the diet you were on before your procedure but eat lightly for the first 12-24 hours.  Drink plenty of water.  Resume any regular medications unless otherwise advised by your physician.  Please begin any new medication prescribed as a result of your procedure as directed by your physician.   If you had any biopsy or polyp removed please refrain from aspirin or aspirin products for 2 days.  If on Coumadin please restart as instructed by your physician.   Pain:  You may take Tylenol as needed for pain.  Expected Recovery:  You can expect some mild abdominal fullness and/or discomfort due to the air used to inflate your intestinal tract.     Call Your Physician if You Have:  After Colonoscopy:  Worsening persisting abdominal pain which is worse with activity.  Fevers (>101 degrees F), chills or shakes.  Passage of continued blood with bowel movements.   Any questions or concerns about your recovery, please call [phone].    Follow-up Care:  You did have polyps/biopsy tissue sample(s) removed.  The polyps/biopsy tissue sample(s) will be sent to pathology.  You should receive letter in your My Chart from Dr. Allred with your results within 1-2 weeks. If you do not participate in My Chart a physical letter will come in the mail in 2-3 weeks.  Please call if you have not received a notification of your results.  If asked to return to clinic please make an appointment 1 week after your procedure.  Call [phone].

## 2023-01-02 NOTE — ANESTHESIA PREPROCEDURE EVALUATION
"Anesthesia Pre-Procedure Evaluation    Patient: Shari Arshad   MRN: [id number] : 1985        Procedure : Procedure(s):  COLONOSCOPY          Past Medical History:   Diagnosis Date     Gastro-oesophageal reflux disease      Heartburn during pregnancy in third trimester 2017     Hypertension      Lactose intolerance      PIH (pregnancy induced hypertension) 2017     Rh(-), needs rhogam at 28 wks gestation 2017     S/P  section 2017      Past Surgical History:   Procedure Laterality Date      SECTION N/A 2017    Procedure:  SECTION;   Section;  Surgeon: Zechariah Mcdonald MD;  Location: [facility]     HC EXCIS PRIMARY GANGLION WRIST  3/24/2004    Excision, volar wrist ganglion, left.     HC EXCIS RECURRENT GANGLION WRIST  2006    Recurrent volar wrist ganglion, left.     OPEN REDUCTION INTERNAL FIXATION WRIST      Cadavar bone      Allergies   Allergen Reactions     Fruit Extracts Hives     Fruits from trees only if she touches them     Milk [Lac Bovis] GI Disturbance     No Known Drug Allergies       Social History     Tobacco Use     Smoking status: Every Day     Packs/day: 0.50     Types: Cigarettes     Smokeless tobacco: Never     Tobacco comments:     \"Trying to quit\"   Reports 4-6 cig/day   Substance Use Topics     Alcohol use: Yes     Alcohol/week: 0.0 standard drinks     Comment: 2x a month not while pregnant      Wt Readings from Last 1 Encounters:   22 117.7 kg (259 lb 8 oz)        Anesthesia Evaluation   Pt has had prior anesthetic. Type: MAC and Regional.    No history of anesthetic complications       ROS/MED HX  ENT/Pulmonary:     (+) tobacco use, Current use,     Neurologic: Comment: Paresthesia bilateral feet      Cardiovascular:     (+) hypertension-----    METS/Exercise Tolerance:     Hematologic:  - neg hematologic  ROS     Musculoskeletal:       GI/Hepatic:     (+) esophageal disease,     Renal/Genitourinary:       Endo:   "   (+) Obesity,     Psychiatric/Substance Use:     (+) psychiatric history anxiety     Infectious Disease:  - neg infectious disease ROS     Malignancy:  - neg malignancy ROS     Other:  - neg other ROS          Physical Exam    Airway  airway exam normal      Mallampati: II   TM distance: > 3 FB   Neck ROM: full   Mouth opening: > 3 cm    Respiratory Devices and Support         Dental  no notable dental history         Cardiovascular   cardiovascular exam normal       Rhythm and rate: regular and normal     Pulmonary   pulmonary exam normal        breath sounds clear to auscultation           OUTSIDE LABS:  CBC:   Lab Results   Component Value Date    WBC 12.0 (H) 12/22/2022    WBC 13.8 (H) 08/29/2017    HGB 15.3 12/22/2022    HGB 12.9 08/30/2017    HCT 46.2 12/22/2022    HCT 41.7 08/29/2017     12/22/2022     08/29/2017     BMP:   Lab Results   Component Value Date     08/14/2022     08/23/2021    POTASSIUM 4.0 08/14/2022    POTASSIUM 3.7 08/23/2021    CHLORIDE 108 08/14/2022    CHLORIDE 109 08/23/2021    CO2 26 08/14/2022    CO2 26 08/23/2021    BUN 14 08/14/2022    BUN 14 08/23/2021    CR 0.74 08/14/2022    CR 0.93 08/23/2021    GLC 91 08/14/2022    GLC 88 08/23/2021     COAGS: No results found for: PTT, INR, FIBR  POC: No results found for: BGM, HCG, HCGS  HEPATIC:   Lab Results   Component Value Date    ALBUMIN 3.8 08/14/2022    PROTTOTAL 7.0 08/14/2022    ALT 18 08/14/2022    AST 9 08/14/2022    ALKPHOS 89 08/14/2022    BILITOTAL 0.4 08/14/2022     OTHER:   Lab Results   Component Value Date    KONG 8.7 08/14/2022    TSH 1.62 08/23/2021       Anesthesia Plan    ASA Status:  2   NPO Status:  NPO Appropriate    Anesthesia Type: MAC.     - Reason for MAC: straight local not clinically adequate   Induction: Intravenous, Propofol.   Maintenance: TIVA.        Consents    Anesthesia Plan(s) and associated risks, benefits, and realistic alternatives discussed. Questions answered and  patient/representative(s) expressed understanding.    - Discussed:     - Discussed with:  Patient      - Extended Intubation/Ventilatory Support Discussed: No.      - Patient is DNR/DNI Status: No    Use of blood products discussed: No .     Postoperative Care    Pain management: Oral pain medications.   PONV prophylaxis: Background Propofol Infusion     Comments:    Other Comments: The risks and benefits of anesthesia, and the alternatives where applicable, have been discussed with the patient, and they wish to proceed.            QASIM Ferrell CRNA

## 2023-01-02 NOTE — ANESTHESIA CARE TRANSFER NOTE
Patient: Shari Arshad    Procedure: Procedure(s):  COLONOSCOPY, WITH POLYPECTOMY AND BIOPSY       Diagnosis: Diarrhea of presumed infectious origin [R19.7]  Diagnosis Additional Information: No value filed.    Anesthesia Type:   MAC     Note:    Oropharynx: oropharynx clear of all foreign objects and spontaneously breathing  Level of Consciousness: drowsy  Oxygen Supplementation: room air    Independent Airway: airway patency satisfactory and stable  Dentition: dentition unchanged  Vital Signs Stable: post-procedure vital signs reviewed and stable  Report to RN Given: handoff report given  Patient transferred to: Phase II    Handoff Report: Identifed the Patient, Identified the Reponsible Provider, Reviewed the pertinent medical history, Discussed the surgical course, Reviewed Intra-OP anesthesia mangement and issues during anesthesia, Set expectations for post-procedure period and Allowed opportunity for questions and acknowledgement of understanding      Vitals:  Vitals Value Taken Time   /53 01/02/23 1118   Temp     Pulse 61 01/02/23 1118   Resp     SpO2 95 % 01/02/23 1120   Vitals shown include unvalidated device data.    Electronically Signed By: QASIM Ferrell CRNA  January 2, 2023  11:21 AM

## 2023-01-02 NOTE — ANESTHESIA POSTPROCEDURE EVALUATION
Patient: Shari Arshad    Procedure: Procedure(s):  COLONOSCOPY, WITH POLYPECTOMY AND BIOPSY       Anesthesia Type:  MAC    Note:  Disposition: Outpatient   Postop Pain Control: Uneventful            Sign Out: Well controlled pain   PONV: No   Neuro/Psych: Uneventful            Sign Out: Acceptable/Baseline neuro status   Airway/Respiratory: Uneventful            Sign Out: Acceptable/Baseline resp. status   CV/Hemodynamics: Uneventful            Sign Out: Acceptable CV status   Other NRE: NONE   DID A NON-ROUTINE EVENT OCCUR? No    Event details/Postop Comments:  Pt was happy with anesthesia care.  No complications.  I will follow up with the pt if needed.           Last vitals:  Vitals Value Taken Time   /97 01/02/23 1131   Temp     Pulse 64 01/02/23 1131   Resp     SpO2 96 % 01/02/23 1135   Vitals shown include unvalidated device data.    Electronically Signed By: QASIM Ferrell CRNA  January 2, 2023  11:38 AM

## 2023-01-04 LAB
PATH REPORT.COMMENTS IMP SPEC: NORMAL
PATH REPORT.COMMENTS IMP SPEC: NORMAL
PATH REPORT.FINAL DX SPEC: NORMAL
PATH REPORT.GROSS SPEC: NORMAL
PATH REPORT.MICROSCOPIC SPEC OTHER STN: NORMAL
PATH REPORT.RELEVANT HX SPEC: NORMAL
PHOTO IMAGE: NORMAL

## 2023-03-03 ENCOUNTER — E-VISIT (OUTPATIENT)
Dept: FAMILY MEDICINE | Facility: CLINIC | Age: 38
End: 2023-03-03
Payer: COMMERCIAL

## 2023-03-03 DIAGNOSIS — E88.9 ERROR, INBORN METABOLISM: Primary | ICD-10-CM

## 2023-03-03 PROCEDURE — 99207 PR NON-BILLABLE SERV PER CHARTING: CPT | Performed by: FAMILY MEDICINE

## 2023-03-06 NOTE — PATIENT INSTRUCTIONS
Dear Shari Arshad?     After reviewing your responses, I am unable to make a diagnosis that can be treated online.    You will not be charged for this eVisit.     We are dedicated to helping you achieve your best health and would like to see you in one of our many clinic locations - a primary care provider would be ideal for your concern.    Please use [employer] to schedule a visit with a provider or call [phone]) to schedule at any of our locations.    Thanks for choosing?us?as your health care partner,?   ?   Argentina Carrasco NP?

## 2023-04-04 ENCOUNTER — OFFICE VISIT (OUTPATIENT)
Dept: FAMILY MEDICINE | Facility: CLINIC | Age: 38
End: 2023-04-04
Payer: COMMERCIAL

## 2023-04-04 ENCOUNTER — HOSPITAL ENCOUNTER (OUTPATIENT)
Dept: GENERAL RADIOLOGY | Facility: CLINIC | Age: 38
Discharge: HOME | End: 2023-04-04
Attending: FAMILY MEDICINE | Admitting: FAMILY MEDICINE
Payer: COMMERCIAL

## 2023-04-04 VITALS
OXYGEN SATURATION: 98 % | BODY MASS INDEX: 43.84 KG/M2 | DIASTOLIC BLOOD PRESSURE: 94 MMHG | HEART RATE: 78 BPM | TEMPERATURE: 98 F | SYSTOLIC BLOOD PRESSURE: 154 MMHG | HEIGHT: 65 IN | WEIGHT: 263.13 LBS

## 2023-04-04 DIAGNOSIS — R10.30 LOWER ABDOMINAL PAIN: ICD-10-CM

## 2023-04-04 DIAGNOSIS — Z78.9 HEPATITIS B VIRUS SEROLOGIC STATUS UNKNOWN: ICD-10-CM

## 2023-04-04 DIAGNOSIS — R10.30 LOWER ABDOMINAL PAIN: Primary | ICD-10-CM

## 2023-04-04 DIAGNOSIS — E66.01 MORBID OBESITY (H): ICD-10-CM

## 2023-04-04 LAB
ALBUMIN SERPL BCG-MCNC: 4.2 G/DL (ref 3.5–5.2)
ALBUMIN UR-MCNC: NEGATIVE MG/DL
ALP SERPL-CCNC: 99 U/L (ref 35–104)
ALT SERPL W P-5'-P-CCNC: 17 U/L (ref 10–35)
ANION GAP SERPL CALCULATED.3IONS-SCNC: 10 MMOL/L (ref 7–15)
APPEARANCE UR: CLEAR
AST SERPL W P-5'-P-CCNC: 18 U/L (ref 10–35)
BASOPHILS # BLD AUTO: 0 10E3/UL (ref 0–0.2)
BASOPHILS NFR BLD AUTO: 0 %
BILIRUB SERPL-MCNC: 0.3 MG/DL
BILIRUB UR QL STRIP: NEGATIVE
BUN SERPL-MCNC: 9.5 MG/DL (ref 6–20)
CALCIUM SERPL-MCNC: 9.1 MG/DL (ref 8.6–10)
CHLORIDE SERPL-SCNC: 104 MMOL/L (ref 98–107)
COLOR UR AUTO: YELLOW
CREAT SERPL-MCNC: 0.74 MG/DL (ref 0.51–0.95)
CREAT UR-MCNC: 43.5 MG/DL
DEPRECATED HCO3 PLAS-SCNC: 25 MMOL/L (ref 22–29)
EOSINOPHIL # BLD AUTO: 0.1 10E3/UL (ref 0–0.7)
EOSINOPHIL NFR BLD AUTO: 1 %
ERYTHROCYTE [DISTWIDTH] IN BLOOD BY AUTOMATED COUNT: 13.2 % (ref 10–15)
GFR SERPL CREATININE-BSD FRML MDRD: >90 ML/MIN/1.73M2
GLUCOSE SERPL-MCNC: 99 MG/DL (ref 70–99)
GLUCOSE UR STRIP-MCNC: NEGATIVE MG/DL
HBV SURFACE AB SERPL IA-ACNC: 0.29 M[IU]/ML
HBV SURFACE AB SERPL IA-ACNC: NONREACTIVE M[IU]/ML
HCT VFR BLD AUTO: 44.9 % (ref 35–47)
HGB BLD-MCNC: 14.8 G/DL (ref 11.7–15.7)
HGB UR QL STRIP: NEGATIVE
IMM GRANULOCYTES # BLD: 0 10E3/UL
IMM GRANULOCYTES NFR BLD: 0 %
KETONES UR STRIP-MCNC: NEGATIVE MG/DL
LEUKOCYTE ESTERASE UR QL STRIP: NEGATIVE
LYMPHOCYTES # BLD AUTO: 2.3 10E3/UL (ref 0.8–5.3)
LYMPHOCYTES NFR BLD AUTO: 27 %
MCH RBC QN AUTO: 29.8 PG (ref 26.5–33)
MCHC RBC AUTO-ENTMCNC: 33 G/DL (ref 31.5–36.5)
MCV RBC AUTO: 91 FL (ref 78–100)
MICROALBUMIN UR-MCNC: <12 MG/L
MICROALBUMIN/CREAT UR: NORMAL MG/G{CREAT}
MONOCYTES # BLD AUTO: 0.5 10E3/UL (ref 0–1.3)
MONOCYTES NFR BLD AUTO: 5 %
NEUTROPHILS # BLD AUTO: 5.7 10E3/UL (ref 1.6–8.3)
NEUTROPHILS NFR BLD AUTO: 67 %
NITRATE UR QL: NEGATIVE
NRBC # BLD AUTO: 0 10E3/UL
NRBC BLD AUTO-RTO: 0 /100
PH UR STRIP: 7 [PH] (ref 5–7)
PLATELET # BLD AUTO: 240 10E3/UL (ref 150–450)
POTASSIUM SERPL-SCNC: 4 MMOL/L (ref 3.4–5.3)
PROT SERPL-MCNC: 6.8 G/DL (ref 6.4–8.3)
RBC # BLD AUTO: 4.96 10E6/UL (ref 3.8–5.2)
SODIUM SERPL-SCNC: 139 MMOL/L (ref 136–145)
SP GR UR STRIP: 1.01 (ref 1–1.03)
UROBILINOGEN UR STRIP-MCNC: NORMAL MG/DL
WBC # BLD AUTO: 8.7 10E3/UL (ref 4–11)

## 2023-04-04 PROCEDURE — 90677 PCV20 VACCINE IM: CPT | Performed by: FAMILY MEDICINE

## 2023-04-04 PROCEDURE — 90472 IMMUNIZATION ADMIN EACH ADD: CPT | Performed by: FAMILY MEDICINE

## 2023-04-04 PROCEDURE — 85025 COMPLETE CBC W/AUTO DIFF WBC: CPT | Performed by: FAMILY MEDICINE

## 2023-04-04 PROCEDURE — 80053 COMPREHEN METABOLIC PANEL: CPT | Performed by: FAMILY MEDICINE

## 2023-04-04 PROCEDURE — 74019 RADEX ABDOMEN 2 VIEWS: CPT

## 2023-04-04 PROCEDURE — 90746 HEPB VACCINE 3 DOSE ADULT IM: CPT | Performed by: FAMILY MEDICINE

## 2023-04-04 PROCEDURE — 82043 UR ALBUMIN QUANTITATIVE: CPT | Performed by: FAMILY MEDICINE

## 2023-04-04 PROCEDURE — 86706 HEP B SURFACE ANTIBODY: CPT | Performed by: FAMILY MEDICINE

## 2023-04-04 PROCEDURE — 81003 URINALYSIS AUTO W/O SCOPE: CPT | Performed by: FAMILY MEDICINE

## 2023-04-04 PROCEDURE — 90471 IMMUNIZATION ADMIN: CPT | Performed by: FAMILY MEDICINE

## 2023-04-04 PROCEDURE — 36415 COLL VENOUS BLD VENIPUNCTURE: CPT | Performed by: FAMILY MEDICINE

## 2023-04-04 PROCEDURE — 82570 ASSAY OF URINE CREATININE: CPT | Performed by: FAMILY MEDICINE

## 2023-04-04 PROCEDURE — 99214 OFFICE O/P EST MOD 30 MIN: CPT | Mod: 25 | Performed by: FAMILY MEDICINE

## 2023-04-04 NOTE — PROGRESS NOTES
Assessment & Plan     (R10.30) Lower abdominal pain  (primary encounter diagnosis)  Comment: Pain is improved from this past month but prior to that had been almost daily and quite debilitating.  Now she is having it 2 or 3 times a month but still quite intense when she gets it.  Plan: XR Abdomen 2 Views, US Pelvic Complete with         Transvaginal, Comprehensive metabolic panel         (BMP + Alb, Alk Phos, ALT, AST, Total. Bili,         TP), Albumin Random Urine Quantitative with         Creat Ratio, UA Macro with Reflex to Micro and         Culture - lab collect, CBC with platelets and         differential, CT Abdomen pelvis w contrast*        X-ray is unremarkable today and she has had previous colonoscopy and biopsies to look for Crohn's or ulcerative colitis and all have been negative.  We will get a CT scan of her abdomen and pelvis with contrast to look at other internal organs critically of the pelvis since her pain is more low in the abdomen to rule out any other pelvic pathology.    (Z78.9) Hepatitis B virus serologic status unknown  Comment: She thought she had been immunized against hepatitis B but she is unsure of her status.  Plan: Hepatitis B Surface Antibody        We will get serology today to look for hepatitis B surface antibody.  If she is negative then we will go ahead and update her with the hepatitis B vaccination series.    (E66.01) Morbid obesity (H)  Comment: Patient has not lost any significant weight.  She has gained a lot of it back after losing 60 pounds after her pregnancy.  Plan: Encouraged her to do as much as she can as far as portion control and increasing activity levels.    Blood pressure was elevated today but she has been out of her metoprolol for a week.  She had dumped the bottle and is waiting for her new refill to be available.    Review of prior external note(s) from - Her endoscopy and biopsies.  35 minutes spent by me on the date of the encounter doing chart review,  "history and exam, documentation and further activities per the note       Nicotine/Tobacco Cessation:  She reports that she has been smoking cigarettes. She has been smoking an average of .5 packs per day. She has never used smokeless tobacco.  Nicotine/Tobacco Cessation Plan:   Information offered: Patient not interested at this time      BMI:   Estimated body mass index is 43.79 kg/m  as calculated from the following:    Height as of this encounter: 1.651 m (5' 5\").    Weight as of this encounter: 119.4 kg (263 lb 2 oz).   Weight management plan: Patient is morbidly obese and we did discuss portion control and activity levels.  She is not wanting any other interventions at this time.    Electronically signed by:  Zechariah Mcdonald M.D.  4/4/2023    Cali Mccarthy is a 37 year old, presenting for the following health issues:  Abdominal Pain        4/4/2023     9:05 AM   Additional Questions   Roomed by CHLOÉ ARIAS MA     History of Present Illness       Reason for visit:  Stomach pains front lower abdomen  Symptom onset:  More than a month  Symptoms include:  Dibilitating reoccuring pain in front lower abdomine not effected by food  Symptom intensity:  Severe  Symptom progression:  Improving  Had these symptoms before:  No  What makes it worse:  Sitting standing eating not eating  What makes it better:  Laying flat and sleep    She eats 2-3 servings of fruits and vegetables daily.She consumes 0 sweetened beverage(s) daily.She exercises with enough effort to increase her heart rate 10 to 19 minutes per day.  She exercises with enough effort to increase her heart rate 3 or less days per week.   She is taking medications regularly.     Is a lot better now this past month, but still getting it 2-3 times a month.  No heart burn or reflux or upper abdominal pain is noted.  She does not associate this with food intake at all.  She does get 2 days of bad headaches right around her menses but this is not related to " "the abdominal pain either.  Twice this year she missed a menses but otherwise she has been pretty regular.      Review of Systems   Constitutional, HEENT, cardiovascular, pulmonary, gi and gu systems are negative, except as otherwise noted.      Objective    BP (!) 154/94   Pulse 78   Temp 98  F (36.7  C) (Temporal)   Ht 1.651 m (5' 5\")   Wt 119.4 kg (263 lb 2 oz)   LMP 01/31/2023 (Exact Date)   SpO2 98%   BMI 43.79 kg/m    Body mass index is 43.79 kg/m .t  Physical Exam   GENERAL: healthy, alert and no distress  NECK: no adenopathy, no asymmetry, masses, or scars and thyroid normal to palpation  RESP: lungs clear to auscultation - no rales, rhonchi or wheezes  CV: regular rates and rhythm, normal S1 S2, no S3 or S4 and no peripheral edema  ABDOMEN: Obese soft nontender throughout.  Minimal tenderness in the lower quadrants but she said her pain essentially is gone today.  There is no rebound or guarding noted.    Results for orders placed or performed in visit on 04/04/23 (from the past 24 hour(s))   Comprehensive metabolic panel (BMP + Alb, Alk Phos, ALT, AST, Total. Bili, TP)   Result Value Ref Range    Sodium 139 136 - 145 mmol/L    Potassium 4.0 3.4 - 5.3 mmol/L    Chloride 104 98 - 107 mmol/L    Carbon Dioxide (CO2) 25 22 - 29 mmol/L    Anion Gap 10 7 - 15 mmol/L    Urea Nitrogen 9.5 6.0 - 20.0 mg/dL    Creatinine 0.74 0.51 - 0.95 mg/dL    Calcium 9.1 8.6 - 10.0 mg/dL    Glucose 99 70 - 99 mg/dL    Alkaline Phosphatase 99 35 - 104 U/L    AST 18 10 - 35 U/L    ALT 17 10 - 35 U/L    Protein Total 6.8 6.4 - 8.3 g/dL    Albumin 4.2 3.5 - 5.2 g/dL    Bilirubin Total 0.3 <=1.2 mg/dL    GFR Estimate >90 >60 mL/min/1.73m2   CBC with platelets and differential    Narrative    The following orders were created for panel order CBC with platelets and differential.  Procedure                               Abnormality         Status                     ---------                               -----------         " ------                     CBC with platelets and d...[[id number]]                      Final result                 Please view results for these tests on the individual orders.   CBC with platelets and differential   Result Value Ref Range    WBC Count 8.7 4.0 - 11.0 10e3/uL    RBC Count 4.96 3.80 - 5.20 10e6/uL    Hemoglobin 14.8 11.7 - 15.7 g/dL    Hematocrit 44.9 35.0 - 47.0 %    MCV 91 78 - 100 fL    MCH 29.8 26.5 - 33.0 pg    MCHC 33.0 31.5 - 36.5 g/dL    RDW 13.2 10.0 - 15.0 %    Platelet Count 240 150 - 450 10e3/uL    % Neutrophils 67 %    % Lymphocytes 27 %    % Monocytes 5 %    % Eosinophils 1 %    % Basophils 0 %    % Immature Granulocytes 0 %    NRBCs per 100 WBC 0 <1 /100    Absolute Neutrophils 5.7 1.6 - 8.3 10e3/uL    Absolute Lymphocytes 2.3 0.8 - 5.3 10e3/uL    Absolute Monocytes 0.5 0.0 - 1.3 10e3/uL    Absolute Eosinophils 0.1 0.0 - 0.7 10e3/uL    Absolute Basophils 0.0 0.0 - 0.2 10e3/uL    Absolute Immature Granulocytes 0.0 <=0.4 10e3/uL    Absolute NRBCs 0.0 10e3/uL   Albumin Random Urine Quantitative with Creat Ratio   Result Value Ref Range    Creatinine Urine mg/dL 43.5 mg/dL    Albumin Urine mg/L <12.0 mg/L    Albumin Urine mg/g Cr     UA Macro with Reflex to Micro and Culture - lab collect    Specimen: Urine, NOS   Result Value Ref Range    Color Urine Yellow Colorless, Straw, Light Yellow, Yellow    Appearance Urine Clear Clear    Glucose Urine Negative Negative mg/dL    Bilirubin Urine Negative Negative    Ketones Urine Negative Negative mg/dL    Specific Gravity Urine 1.008 1.003 - 1.035    Blood Urine Negative Negative    pH Urine 7.0 5.0 - 7.0    Protein Albumin Urine Negative Negative mg/dL    Urobilinogen Urine Normal Normal, 2.0 mg/dL    Nitrite Urine Negative Negative    Leukocyte Esterase Urine Negative Negative    Narrative    Microscopic not indicated

## 2023-04-04 NOTE — PROGRESS NOTES
Prior to immunization administration, verified patients identity using patient s name and date of birth. Please see Immunization Activity for additional information.     Screening Questionnaire for Adult Immunization    Are you sick today?   No   Do you have allergies to medications, food, a vaccine component or latex?   No   Have you ever had a serious reaction after receiving a vaccination?   No   Do you have a long-term health problem with heart, lung, kidney, or metabolic disease (e.g., diabetes), asthma, a blood disorder, no spleen, complement component deficiency, a cochlear implant, or a spinal fluid leak?  Are you on long-term aspirin therapy?   No   Do you have cancer, leukemia, HIV/AIDS, or any other immune system problem?   No   Do you have a parent, brother, or sister with an immune system problem?   No   In the past 3 months, have you taken medications that affect  your immune system, such as prednisone, other steroids, or anticancer drugs; drugs for the treatment of rheumatoid arthritis, Crohn s disease, or psoriasis; or have you had radiation treatments?   No   Have you had a seizure, or a brain or other nervous system problem?   No   During the past year, have you received a transfusion of blood or blood    products, or been given immune (gamma) globulin or antiviral drug?   No   For women: Are you pregnant or is there a chance you could become       pregnant during the next month?   No   Have you received any vaccinations in the past 4 weeks?   No     Immunization questionnaire answers were all negative.      Injection of hep b and pnumo 20 given by Katlyn Ramirez MA. Patient instructed to remain in clinic for 15 minutes afterwards, and to report any adverse reactions.     Screening performed by Katlyn Ramirez MA on 4/4/2023 at 10:37 AM.

## 2023-04-07 ENCOUNTER — HOSPITAL ENCOUNTER (OUTPATIENT)
Dept: CT IMAGING | Facility: CLINIC | Age: 38
Discharge: HOME | End: 2023-04-07
Attending: FAMILY MEDICINE | Admitting: FAMILY MEDICINE
Payer: COMMERCIAL

## 2023-04-07 DIAGNOSIS — R10.30 LOWER ABDOMINAL PAIN: ICD-10-CM

## 2023-04-07 PROCEDURE — 250N000011 HC RX IP 250 OP 636: Performed by: FAMILY MEDICINE

## 2023-04-07 PROCEDURE — 74177 CT ABD & PELVIS W/CONTRAST: CPT

## 2023-04-07 PROCEDURE — 250N000009 HC RX 250: Performed by: FAMILY MEDICINE

## 2023-04-07 RX ORDER — IOPAMIDOL 755 MG/ML
500 INJECTION, SOLUTION INTRAVASCULAR ONCE
Status: COMPLETED | OUTPATIENT
Start: 2023-04-07 | End: 2023-04-07

## 2023-04-07 RX ADMIN — IOPAMIDOL 90 ML: 755 INJECTION, SOLUTION INTRAVENOUS at 08:12

## 2023-04-07 RX ADMIN — SODIUM CHLORIDE 60 ML: 9 INJECTION, SOLUTION INTRAVENOUS at 08:12

## 2023-06-06 DIAGNOSIS — N94.3 PMS (PREMENSTRUAL SYNDROME): ICD-10-CM

## 2023-06-07 RX ORDER — FLUOXETINE 10 MG/1
CAPSULE ORAL
Qty: 90 CAPSULE | Refills: 1 | Status: SHIPPED | OUTPATIENT
Start: 2023-06-07 | End: 2023-12-13

## 2023-06-07 NOTE — TELEPHONE ENCOUNTER
Prescription approved per [facility] Refill Protocol.  Nighat Salcido RN on 6/7/2023 at 10:26 AM

## 2023-07-13 DIAGNOSIS — I10 BENIGN ESSENTIAL HYPERTENSION: ICD-10-CM

## 2023-07-17 NOTE — TELEPHONE ENCOUNTER
"Requested Prescriptions   Pending Prescriptions Disp Refills    metoprolol succinate ER (TOPROL XL) 100 MG 24 hr tablet [Pharmacy Med Name: METOPROLOL SUCCINATE ER 100MG TB24] 90 tablet 3     Sig: TAKE ONE TABLET BY MOUTH ONCE DAILY       Beta-Blockers Protocol Failed - 7/13/2023  7:48 AM        Failed - Blood pressure under 140/90 in past 12 months     BP Readings from Last 3 Encounters:   04/04/23 (!) 154/94   01/02/23 101/76   12/22/22 (!) 142/110                 Passed - Patient is age 6 or older        Passed - Recent (12 mo) or future (30 days) visit within the authorizing provider's specialty     Patient has had an office visit with the authorizing provider or a provider within the authorizing providers department within the previous 12 mos or has a future within next 30 days. See \"Patient Info\" tab in inbasket, or \"Choose Columns\" in Meds & Orders section of the refill encounter.              Passed - Medication is active on med list             "

## 2023-07-21 RX ORDER — METOPROLOL SUCCINATE 100 MG/1
TABLET, EXTENDED RELEASE ORAL
Qty: 90 TABLET | Refills: 3 | Status: SHIPPED | OUTPATIENT
Start: 2023-07-21 | End: 2024-08-15

## 2023-07-21 NOTE — TELEPHONE ENCOUNTER
Needs BP recheck with float nurse or me.    Electronically signed by:  Zechariah Mcdonald M.D.  7/21/2023

## 2023-08-03 ENCOUNTER — ALLIED HEALTH/NURSE VISIT (OUTPATIENT)
Dept: FAMILY MEDICINE | Facility: CLINIC | Age: 38
End: 2023-08-03
Payer: COMMERCIAL

## 2023-08-03 VITALS — DIASTOLIC BLOOD PRESSURE: 72 MMHG | SYSTOLIC BLOOD PRESSURE: 130 MMHG

## 2023-08-03 DIAGNOSIS — Z01.30 BLOOD PRESSURE CHECK: Primary | ICD-10-CM

## 2023-08-03 PROCEDURE — 99207 PR NO CHARGE NURSE ONLY: CPT

## 2023-08-03 NOTE — PROGRESS NOTES
I met with Shari Arshad at the request of Dr. Mcdonald to recheck her blood pressure.  Blood pressure medications on the med list were reviewed with patient.    Patient has taken all medications as per usual regimen: Yes  Patient reports tolerating them without any issues or concerns: Yes    Vitals:    08/03/23 1429   BP: 130/72       Blood pressure was taken, previous encounter was reviewed, recorded blood pressure below 140/90.  Patient was discharged and the note will be sent to the provider for final review.

## 2023-08-08 DIAGNOSIS — J30.1 SEASONAL ALLERGIC RHINITIS DUE TO POLLEN: ICD-10-CM

## 2023-08-09 RX ORDER — MONTELUKAST SODIUM 10 MG/1
TABLET ORAL
Qty: 90 TABLET | Refills: 2 | Status: SHIPPED | OUTPATIENT
Start: 2023-08-09 | End: 2024-05-10

## 2023-08-15 DIAGNOSIS — I10 BENIGN ESSENTIAL HYPERTENSION: ICD-10-CM

## 2023-08-16 RX ORDER — LISINOPRIL 20 MG/1
TABLET ORAL
Qty: 90 TABLET | Refills: 3 | Status: SHIPPED | OUTPATIENT
Start: 2023-08-16 | End: 2024-09-12

## 2023-10-09 DIAGNOSIS — R12 HEARTBURN: ICD-10-CM

## 2023-10-28 ENCOUNTER — HEALTH MAINTENANCE LETTER (OUTPATIENT)
Age: 38
End: 2023-10-28

## 2023-12-13 ENCOUNTER — MYC REFILL (OUTPATIENT)
Dept: FAMILY MEDICINE | Facility: CLINIC | Age: 38
End: 2023-12-13
Payer: COMMERCIAL

## 2023-12-13 DIAGNOSIS — I10 BENIGN ESSENTIAL HYPERTENSION: ICD-10-CM

## 2023-12-13 DIAGNOSIS — N94.3 PMS (PREMENSTRUAL SYNDROME): ICD-10-CM

## 2023-12-14 RX ORDER — LISINOPRIL 20 MG/1
20 TABLET ORAL DAILY
Qty: 90 TABLET | Refills: 3 | OUTPATIENT
Start: 2023-12-14

## 2023-12-14 RX ORDER — FLUOXETINE 10 MG/1
CAPSULE ORAL
Qty: 90 CAPSULE | Refills: 1 | OUTPATIENT
Start: 2023-12-14

## 2023-12-14 RX ORDER — FLUOXETINE 10 MG/1
10 CAPSULE ORAL DAILY
Qty: 90 CAPSULE | Refills: 1 | Status: SHIPPED | OUTPATIENT
Start: 2023-12-14 | End: 2024-06-13

## 2024-01-18 NOTE — PROGRESS NOTES
Pt appears generally  frustrated this morning. Has been breastfeeding and then supplementing by bottle with breastmilk and formula.   still remains fussy and is rooting like she is hungary.     no Dupixent Counseling: I discussed with the patient the risks of dupilumab including but not limited to eye infection and irritation, cold sores, injection site reactions, worsening of asthma, allergic reactions and increased risk of parasitic infection.  Live vaccines should be avoided while taking dupilumab. Dupilumab will also interact with certain medications such as warfarin and cyclosporine. The patient understands that monitoring is required and they must alert us or the primary physician if symptoms of infection or other concerning signs are noted.

## 2024-03-14 ENCOUNTER — OFFICE VISIT (OUTPATIENT)
Dept: FAMILY MEDICINE | Facility: CLINIC | Age: 39
End: 2024-03-14
Payer: COMMERCIAL

## 2024-03-14 VITALS
WEIGHT: 268.4 LBS | BODY MASS INDEX: 44.72 KG/M2 | RESPIRATION RATE: 20 BRPM | TEMPERATURE: 97.3 F | HEIGHT: 65 IN | HEART RATE: 46 BPM | DIASTOLIC BLOOD PRESSURE: 104 MMHG | OXYGEN SATURATION: 98 % | SYSTOLIC BLOOD PRESSURE: 144 MMHG

## 2024-03-14 DIAGNOSIS — I10 BENIGN ESSENTIAL HYPERTENSION: ICD-10-CM

## 2024-03-14 DIAGNOSIS — Z13.1 SCREENING FOR DIABETES MELLITUS: ICD-10-CM

## 2024-03-14 DIAGNOSIS — R10.84 ABDOMINAL PAIN, GENERALIZED: ICD-10-CM

## 2024-03-14 DIAGNOSIS — Z13.220 LIPID SCREENING: ICD-10-CM

## 2024-03-14 DIAGNOSIS — E66.01 MORBID OBESITY (H): ICD-10-CM

## 2024-03-14 DIAGNOSIS — R10.11 ABDOMINAL PAIN, RIGHT UPPER QUADRANT: ICD-10-CM

## 2024-03-14 DIAGNOSIS — F17.200 TOBACCO USE DISORDER: ICD-10-CM

## 2024-03-14 LAB
ALBUMIN SERPL BCG-MCNC: 4.5 G/DL (ref 3.5–5.2)
ALP SERPL-CCNC: 90 U/L (ref 40–150)
ALT SERPL W P-5'-P-CCNC: 13 U/L (ref 0–50)
ANION GAP SERPL CALCULATED.3IONS-SCNC: 9 MMOL/L (ref 7–15)
AST SERPL W P-5'-P-CCNC: 14 U/L (ref 0–45)
BILIRUB SERPL-MCNC: 0.5 MG/DL
BUN SERPL-MCNC: 12.4 MG/DL (ref 6–20)
CALCIUM SERPL-MCNC: 9.7 MG/DL (ref 8.6–10)
CHLORIDE SERPL-SCNC: 101 MMOL/L (ref 98–107)
CHOLEST SERPL-MCNC: 233 MG/DL
CREAT SERPL-MCNC: 0.87 MG/DL (ref 0.51–0.95)
CREAT UR-MCNC: 72.5 MG/DL
DEPRECATED HCO3 PLAS-SCNC: 25 MMOL/L (ref 22–29)
EGFRCR SERPLBLD CKD-EPI 2021: 86 ML/MIN/1.73M2
FASTING STATUS PATIENT QL REPORTED: YES
GLUCOSE SERPL-MCNC: 90 MG/DL (ref 70–99)
HDLC SERPL-MCNC: 52 MG/DL
LDLC SERPL CALC-MCNC: 156 MG/DL
MICROALBUMIN UR-MCNC: 14.1 MG/L
MICROALBUMIN/CREAT UR: 19.45 MG/G CR (ref 0–25)
NONHDLC SERPL-MCNC: 181 MG/DL
POTASSIUM SERPL-SCNC: 4.2 MMOL/L (ref 3.4–5.3)
PROT SERPL-MCNC: 7.7 G/DL (ref 6.4–8.3)
SODIUM SERPL-SCNC: 135 MMOL/L (ref 135–145)
TRIGL SERPL-MCNC: 126 MG/DL
TSH SERPL DL<=0.005 MIU/L-ACNC: 1.36 UIU/ML (ref 0.3–4.2)

## 2024-03-14 PROCEDURE — 82570 ASSAY OF URINE CREATININE: CPT | Performed by: FAMILY MEDICINE

## 2024-03-14 PROCEDURE — 80053 COMPREHEN METABOLIC PANEL: CPT | Performed by: FAMILY MEDICINE

## 2024-03-14 PROCEDURE — 36415 COLL VENOUS BLD VENIPUNCTURE: CPT | Performed by: FAMILY MEDICINE

## 2024-03-14 PROCEDURE — 80061 LIPID PANEL: CPT | Performed by: FAMILY MEDICINE

## 2024-03-14 PROCEDURE — 82043 UR ALBUMIN QUANTITATIVE: CPT | Performed by: FAMILY MEDICINE

## 2024-03-14 PROCEDURE — 99214 OFFICE O/P EST MOD 30 MIN: CPT | Mod: 25 | Performed by: FAMILY MEDICINE

## 2024-03-14 PROCEDURE — 90471 IMMUNIZATION ADMIN: CPT | Performed by: FAMILY MEDICINE

## 2024-03-14 PROCEDURE — 90746 HEPB VACCINE 3 DOSE ADULT IM: CPT | Performed by: FAMILY MEDICINE

## 2024-03-14 PROCEDURE — 84443 ASSAY THYROID STIM HORMONE: CPT | Performed by: FAMILY MEDICINE

## 2024-03-14 RX ORDER — VARENICLINE TARTRATE 0.5 (11)-1
KIT ORAL
Qty: 53 TABLET | Refills: 0 | Status: SHIPPED | OUTPATIENT
Start: 2024-03-14 | End: 2024-06-14

## 2024-03-14 RX ORDER — HYDROCHLOROTHIAZIDE 25 MG/1
25 TABLET ORAL DAILY
Qty: 90 TABLET | Refills: 3 | Status: SHIPPED | OUTPATIENT
Start: 2024-03-14

## 2024-03-14 ASSESSMENT — PAIN SCALES - GENERAL: PAINLEVEL: NO PAIN (0)

## 2024-03-14 NOTE — PROGRESS NOTES
Assessment & Plan     (R10.84) Abdominal pain, generalized  (R10.11) Abdominal pain, right upper quadrant   Comment: Generalized abdominal pain that seems deep in that right to mid abdominal area.  She had some kidney stones diagnosed a year ago this coming April and she feels that the pain is from those.  We need to do a follow-up CT scan to look at this further.  She does relay some postprandial pain so I am not convinced it is all coming from her kidney since she is not complaining of a lot of flank pain so need to look at the gallbladder also.  She has a family history of gallbladder disease in her mother, she is 38 years old and morbidly obese.  Plan: CT Abdomen Pelvis w Contrast, US Abdomen         Limited        Will do a limited ultrasound looking at the right upper quadrant for gallbladder disease and get a follow-up CT scan to look at those kidney stones to make sure they are not worsening or becoming obstructive.    (E66.01) Morbid obesity (H)  Comment: Patient has not had any luck can lose weight on her own.  She is agreeable to the referral to the comprehensive weight management clinic down the [facility].  Plan: Adult Comprehensive Weight Management         Referral, Comprehensive metabolic panel (BMP +         Alb, Alk Phos, ALT, AST, Total. Bili, TP), TSH         with free T4 reflex        Labs are obtained today.  I also placed a referral.    (I10) Benign essential hypertension  Comment: Blood pressure is elevated despite current medication management.  Plan: hydrochlorothiazide (HYDRODIURIL) 25 MG tablet,        Comprehensive metabolic panel (BMP + Alb, Alk         Phos, ALT, AST, Total. Bili, TP), Albumin         Random Urine Quantitative with Creat Ratio        Her heart rate is already below 50 so I do not want to increase her metoprolol.  We will add hydrochlorothiazide to see if we can get some added benefit from that agent.  Recheck blood pressure in 2  "months.    (Z13.220) Lipid screening  Comment: Due for lipid screening she is fasting today.    Plan: Lipid panel reflex to direct LDL Fasting,         Comprehensive metabolic panel (BMP + Alb, Alk         Phos, ALT, AST, Total. Bili, TP)        Labs were drawn.    (Z13.1) Screening for diabetes mellitus  Comment: Needs screening for diabetes due to her weight and age.  Plan: Drawn today.    (F17.200) Tobacco use disorder  Comment: She stopped smoking cigarettes a year ago but switched it for vaping.  She would like to try to stop vaping and is agreeable to trying Chantix.  Plan: varenicline (CHANTIX IVAN) 0.5 MG X 11 & 1 MG X         42 tablet        Will start the medication low and taper it up.  She will not quits vaping until she has been on the medication for at least 2 weeks.  I explained to her the reportable side effects of the medication and how the medication itself works.  Freezing      Review of prior external note(s) from - her CT scan from last year and lab work that was done.    30 minutes spent by me on the date of the encounter doing chart review, history and exam, documentation and further activities per the note      BMI  Estimated body mass index is 44.66 kg/m  as calculated from the following:    Height as of this encounter: 1.651 m (5' 5\").    Weight as of this encounter: 121.7 kg (268 lb 6.4 oz).   Weight management plan: Specific weight management program called The comprehensive weight management program at the [facility] was discussed, the patient is agreeable to a referral      MEDICATIONS:   Orders Placed This Encounter   Medications    hydrochlorothiazide (HYDRODIURIL) 25 MG tablet     Sig: Take 1 tablet (25 mg) by mouth daily     Dispense:  90 tablet     Refill:  3    varenicline (CHANTIX IVAN) 0.5 MG X 11 & 1 MG X 42 tablet     Sig: Take 0.5 mg tab daily for 3 days, THEN 0.5 mg tab twice daily for 4 days, THEN 1 mg twice daily.     Dispense:  53 tablet     Refill:  0          - " "Continue other medications without change    Subjective   Shari is a 38 year old, presenting for the following health issues:  Abdominal Pain        3/14/2024    10:51 AM   Additional Questions   Roomed by Jacque FREED     History of Present Illness       Reason for visit:  Kidney stones    She eats 2-3 servings of fruits and vegetables daily.She consumes 0 sweetened beverage(s) daily.She exercises with enough effort to increase her heart rate 10 to 19 minutes per day.  She exercises with enough effort to increase her heart rate 3 or less days per week.   She is taking medications regularly.     Concern - Abdominal Pain  Onset: October 2022  Description: Sharp, stabbing to the right of stomach  Intensity: severe  Progression of Symptoms:  worsening  Accompanying Signs & Symptoms: no  Previous history of similar problem: Yes  Precipitating factors:        Worsened by: eating  Alleviating factors:        Improved by: laying down flat  Therapies tried and outcome: None  Says this pain has become more prevalent and more frequent.  She is feeling it on a daily basis and it does interfere with what she wants to eat at times.  Last night pain occurred within 10 to 15 minutes after she ate dinner.  No associated vomiting or diarrhea.  She just wants to make sure that this is not worsening stones in the kidney or something else like a gallbladder.  Her mom has had her gallbladder removed.  Her BP has been elevated at home and work.  Typically the upper number is in the 140-150s.        Review of Systems  Constitutional, HEENT, cardiovascular, pulmonary, gi and gu systems are negative, except as otherwise noted.      Objective    BP (!) 142/110   Pulse (!) 46   Temp 97.3  F (36.3  C) (Temporal)   Resp 20   Ht 1.651 m (5' 5\")   Wt 121.7 kg (268 lb 6.4 oz)   LMP 02/20/2024 (Exact Date)   SpO2 98%   BMI 44.66 kg/m    Body mass index is 44.66 kg/m .  Physical Exam   GENERAL: alert and no distress  RESP: lungs clear to " auscultation - no rales, rhonchi or wheezes  CV: regular rate and rhythm, normal S1 S2, no S3 or S4, no murmur, click or rub, no peripheral edema   ABDOMEN: obese, soft, no tenderness is able to be elicited in the RUQ or epigastric region.    BACK: no CVA tenderness, no paralumbar tenderness    Results for orders placed or performed in visit on 03/14/24 (from the past 24 hour(s))   Lipid panel reflex to direct LDL Fasting   Result Value Ref Range    Cholesterol 233 (H) <200 mg/dL    Triglycerides 126 <150 mg/dL    Direct Measure HDL 52 >=50 mg/dL    LDL Cholesterol Calculated 156 (H) <=100 mg/dL    Non HDL Cholesterol 181 (H) <130 mg/dL    Patient Fasting > 8hrs? Yes     Narrative    Cholesterol  Desirable:  <200 mg/dL    Triglycerides  Normal:  Less than 150 mg/dL  Borderline High:  150-199 mg/dL  High:  200-499 mg/dL  Very High:  Greater than or equal to 500 mg/dL    Direct Measure HDL  Female:  Greater than or equal to 50 mg/dL   Male:  Greater than or equal to 40 mg/dL    LDL Cholesterol  Desirable:  <100mg/dL  Above Desirable:  100-129 mg/dL   Borderline High:  130-159 mg/dL   High:  160-189 mg/dL   Very High:  >= 190 mg/dL    Non HDL Cholesterol  Desirable:  130 mg/dL  Above Desirable:  130-159 mg/dL  Borderline High:  160-189 mg/dL  High:  190-219 mg/dL  Very High:  Greater than or equal to 220 mg/dL   Comprehensive metabolic panel (BMP + Alb, Alk Phos, ALT, AST, Total. Bili, TP)   Result Value Ref Range    Sodium 135 135 - 145 mmol/L    Potassium 4.2 3.4 - 5.3 mmol/L    Carbon Dioxide (CO2) 25 22 - 29 mmol/L    Anion Gap 9 7 - 15 mmol/L    Urea Nitrogen 12.4 6.0 - 20.0 mg/dL    Creatinine 0.87 0.51 - 0.95 mg/dL    GFR Estimate 86 >60 mL/min/1.73m2    Calcium 9.7 8.6 - 10.0 mg/dL    Chloride 101 98 - 107 mmol/L    Glucose 90 70 - 99 mg/dL    Alkaline Phosphatase 90 40 - 150 U/L    AST 14 0 - 45 U/L    ALT 13 0 - 50 U/L    Protein Total 7.7 6.4 - 8.3 g/dL    Albumin 4.5 3.5 - 5.2 g/dL    Bilirubin Total 0.5  <=1.2 mg/dL   TSH with free T4 reflex   Result Value Ref Range    TSH 1.36 0.30 - 4.20 uIU/mL   Albumin Random Urine Quantitative with Creat Ratio   Result Value Ref Range    Creatinine Urine mg/dL 72.5 mg/dL    Albumin Urine mg/L 14.1 mg/L    Albumin Urine mg/g Cr 19.45 0.00 - 25.00 mg/g Cr         Electronically signed by:  Zechariah Mcdonald M.D.  3/14/2024

## 2024-03-14 NOTE — NURSING NOTE
Prior to immunization administration, verified patients identity using patient s name and date of birth. Please see Immunization Activity for additional information.     Screening Questionnaire for Adult Immunization    Are you sick today?   No   Do you have allergies to medications, food, a vaccine component or latex?   Yes   Have you ever had a serious reaction after receiving a vaccination?   No   Do you have a long-term health problem with heart, lung, kidney, or metabolic disease (e.g., diabetes), asthma, a blood disorder, no spleen, complement component deficiency, a cochlear implant, or a spinal fluid leak?  Are you on long-term aspirin therapy?   Yes, blood pressure    Do you have cancer, leukemia, HIV/AIDS, or any other immune system problem?   No   Do you have a parent, brother, or sister with an immune system problem?   No   In the past 3 months, have you taken medications that affect  your immune system, such as prednisone, other steroids, or anticancer drugs; drugs for the treatment of rheumatoid arthritis, Crohn s disease, or psoriasis; or have you had radiation treatments?   No   Have you had a seizure, or a brain or other nervous system problem?   No   During the past year, have you received a transfusion of blood or blood    products, or been given immune (gamma) globulin or antiviral drug?   No   For women: Are you pregnant or is there a chance you could become       pregnant during the next month?   No   Have you received any vaccinations in the past 4 weeks?   No     Immunization questionnaire was positive for at least one answer.  Notified Dr. Mcdonald.      Patient instructed to remain in clinic for 15 minutes afterwards, and to report any adverse reactions.     Screening performed by Rcahel Lamar CMA on 3/14/2024 at 11:36 AM.

## 2024-03-21 ENCOUNTER — HOSPITAL ENCOUNTER (OUTPATIENT)
Dept: ULTRASOUND IMAGING | Facility: CLINIC | Age: 39
Discharge: HOME | End: 2024-03-21
Attending: FAMILY MEDICINE
Payer: COMMERCIAL

## 2024-03-21 ENCOUNTER — HOSPITAL ENCOUNTER (OUTPATIENT)
Dept: CT IMAGING | Facility: CLINIC | Age: 39
Discharge: HOME | End: 2024-03-21
Attending: FAMILY MEDICINE
Payer: COMMERCIAL

## 2024-03-21 DIAGNOSIS — R10.11 ABDOMINAL PAIN, RIGHT UPPER QUADRANT: ICD-10-CM

## 2024-03-21 PROCEDURE — 250N000009 HC RX 250: Performed by: FAMILY MEDICINE

## 2024-03-21 PROCEDURE — 76705 ECHO EXAM OF ABDOMEN: CPT

## 2024-03-21 PROCEDURE — 250N000011 HC RX IP 250 OP 636: Performed by: FAMILY MEDICINE

## 2024-03-21 PROCEDURE — 74177 CT ABD & PELVIS W/CONTRAST: CPT

## 2024-03-21 RX ORDER — IOPAMIDOL 755 MG/ML
500 INJECTION, SOLUTION INTRAVASCULAR ONCE
Status: COMPLETED | OUTPATIENT
Start: 2024-03-21 | End: 2024-03-21

## 2024-03-21 RX ADMIN — IOPAMIDOL 100 ML: 755 INJECTION, SOLUTION INTRAVENOUS at 14:07

## 2024-03-21 RX ADMIN — SODIUM CHLORIDE 60 ML: 9 INJECTION, SOLUTION INTRAVENOUS at 14:05

## 2024-03-22 ENCOUNTER — MYC MEDICAL ADVICE (OUTPATIENT)
Dept: FAMILY MEDICINE | Facility: CLINIC | Age: 39
End: 2024-03-22
Payer: COMMERCIAL

## 2024-03-22 DIAGNOSIS — R10.11 ABDOMINAL PAIN, RIGHT UPPER QUADRANT: Primary | ICD-10-CM

## 2024-03-22 NOTE — TELEPHONE ENCOUNTER
I am not seeing anything about a urologist.   You did say a HIDA scan. That order is pending if you agree.

## 2024-03-26 NOTE — TELEPHONE ENCOUNTER
Yes, a HIDA scan is what I want to do.  Her kidney stones are not obstructing so if the HIDA is negative, then we will go the route of the Urologist.     Electronically signed by:  Zechariah Mcdonald M.D.  3/26/2024

## 2024-05-09 DIAGNOSIS — R12 HEARTBURN: ICD-10-CM

## 2024-05-10 DIAGNOSIS — J30.1 SEASONAL ALLERGIC RHINITIS DUE TO POLLEN: ICD-10-CM

## 2024-05-10 RX ORDER — MONTELUKAST SODIUM 10 MG/1
TABLET ORAL
Qty: 90 TABLET | Refills: 0 | Status: SHIPPED | OUTPATIENT
Start: 2024-05-10 | End: 2024-08-15

## 2024-05-11 DIAGNOSIS — J30.1 SEASONAL ALLERGIC RHINITIS DUE TO POLLEN: ICD-10-CM

## 2024-05-13 RX ORDER — MONTELUKAST SODIUM 10 MG/1
TABLET ORAL
Qty: 90 TABLET | Refills: 2 | OUTPATIENT
Start: 2024-05-13

## 2024-05-22 ENCOUNTER — HOSPITAL ENCOUNTER (OUTPATIENT)
Dept: NUCLEAR MEDICINE | Facility: CLINIC | Age: 39
Setting detail: NUCLEAR MEDICINE
Discharge: HOME | End: 2024-05-22
Attending: FAMILY MEDICINE | Admitting: FAMILY MEDICINE
Payer: COMMERCIAL

## 2024-05-22 DIAGNOSIS — R10.11 ABDOMINAL PAIN, RIGHT UPPER QUADRANT: ICD-10-CM

## 2024-05-22 PROCEDURE — 343N000001 HC RX 343: Performed by: FAMILY MEDICINE

## 2024-05-22 PROCEDURE — 250N000011 HC RX IP 250 OP 636: Performed by: FAMILY MEDICINE

## 2024-05-22 PROCEDURE — A9537 TC99M MEBROFENIN: HCPCS | Performed by: FAMILY MEDICINE

## 2024-05-22 PROCEDURE — 78227 HEPATOBIL SYST IMAGE W/DRUG: CPT

## 2024-05-22 RX ORDER — KIT FOR THE PREPARATION OF TECHNETIUM TC 99M MEBROFENIN 45 MG/10ML
5 INJECTION, POWDER, LYOPHILIZED, FOR SOLUTION INTRAVENOUS ONCE
Status: COMPLETED | OUTPATIENT
Start: 2024-05-22 | End: 2024-05-22

## 2024-05-22 RX ADMIN — MEBROFENIN 4.5 MILLICURIE: 45 INJECTION, POWDER, LYOPHILIZED, FOR SOLUTION INTRAVENOUS at 08:30

## 2024-05-22 RX ADMIN — SINCALIDE 2.4 MCG: 5 INJECTION, POWDER, LYOPHILIZED, FOR SOLUTION INTRAVENOUS at 09:50

## 2024-05-29 DIAGNOSIS — R94.8 ABNORMAL BILIARY HIDA SCAN: Primary | ICD-10-CM

## 2024-05-29 DIAGNOSIS — R10.11 RUQ ABDOMINAL PAIN: ICD-10-CM

## 2024-05-31 ENCOUNTER — OFFICE VISIT (OUTPATIENT)
Dept: SURGERY | Facility: CLINIC | Age: 39
End: 2024-05-31
Attending: FAMILY MEDICINE
Payer: COMMERCIAL

## 2024-05-31 VITALS
TEMPERATURE: 97.8 F | HEART RATE: 52 BPM | SYSTOLIC BLOOD PRESSURE: 128 MMHG | RESPIRATION RATE: 16 BRPM | BODY MASS INDEX: 43.07 KG/M2 | OXYGEN SATURATION: 98 % | DIASTOLIC BLOOD PRESSURE: 78 MMHG | WEIGHT: 268 LBS | HEIGHT: 66 IN

## 2024-05-31 DIAGNOSIS — K82.8 BILIARY DYSKINESIA: Primary | ICD-10-CM

## 2024-05-31 DIAGNOSIS — R94.8 ABNORMAL BILIARY HIDA SCAN: ICD-10-CM

## 2024-05-31 DIAGNOSIS — R10.11 RUQ ABDOMINAL PAIN: ICD-10-CM

## 2024-05-31 PROCEDURE — 99244 OFF/OP CNSLTJ NEW/EST MOD 40: CPT | Performed by: SURGERY

## 2024-05-31 RX ORDER — CEFAZOLIN SODIUM IN 0.9 % NACL 3 G/100 ML
3 INTRAVENOUS SOLUTION, PIGGYBACK (ML) INTRAVENOUS SEE ADMIN INSTRUCTIONS
Status: CANCELLED | OUTPATIENT
Start: 2024-05-31

## 2024-05-31 RX ORDER — INDOCYANINE GREEN AND WATER 25 MG
1.25 KIT INJECTION ONCE
Status: CANCELLED | OUTPATIENT
Start: 2024-05-31 | End: 2024-05-31

## 2024-05-31 RX ORDER — CEFAZOLIN SODIUM IN 0.9 % NACL 3 G/100 ML
3 INTRAVENOUS SOLUTION, PIGGYBACK (ML) INTRAVENOUS
Status: CANCELLED | OUTPATIENT
Start: 2024-05-31

## 2024-05-31 ASSESSMENT — PAIN SCALES - GENERAL: PAINLEVEL: MILD PAIN (2)

## 2024-05-31 NOTE — LETTER
2024         RE: Sahri Arshad  [address] [place] [address]        Dear Colleague,    Thank you for referring your patient, Shari Arshad, to the [facility]. Please see a copy of my visit note below.    Patient seen in consultation for biliary dyskinesia by Zechariah Mcdonald    HPI:  Patient is a 38 year old female with several year history of upper abdominal pain.  She has had this evaluated over the years but no etiology has been found.  She does endorse postprandial nature to her symptoms at times.  She says laying flat sometimes alleviates the pain.  She does also experience heartburn but this is different.  Recent HIDA scan showed decreased ejection fraction consistent with biliary dyskinesia.  She did endorse reproduction of symptoms but this happened after the exam.  She has had a  section in the past.    Review Of Systems    Skin: negative  Ears/Nose/Throat: negative  Respiratory: No shortness of breath, dyspnea on exertion, cough, or hemoptysis  Cardiovascular: negative  Gastrointestinal: negative  Genitourinary: negative  Musculoskeletal: negative  Neurologic: negative  Hematologic/Lymphatic/Immunologic: negative  Endocrine: negative      Past Medical History:   Diagnosis Date     Gastro-oesophageal reflux disease      Heartburn during pregnancy in third trimester 2017     Hypertension      Lactose intolerance      PIH (pregnancy induced hypertension) 2017     Rh(-), needs rhogam at 28 wks gestation 2017     S/P  section 2017       Past Surgical History:   Procedure Laterality Date      SECTION N/A 2017    Procedure:  SECTION;   Section;  Surgeon: Zechariah Mcdonald MD;  Location: [facility] OR     COLONOSCOPY N/A 2023    Procedure: COLONOSCOPY, WITH POLYPECTOMY AND BIOPSY;  Surgeon: Ashvin Collier DO;  Location: [facility] GI     HC EXCIS PRIMARY GANGLION WRIST  3/24/2004    Excision, volar wrist  ganglion, left.     HC EXCIS RECURRENT GANGLION WRIST  06/19/2006    Recurrent volar wrist ganglion, left.     OPEN REDUCTION INTERNAL FIXATION WRIST      Cadavar bone       Family History   Problem Relation Age of Onset     Lipids Mother      Musculoskeletal Disorder Mother         ms     Allergies Mother      Depression Mother      Genitourinary Problems Mother      Respiratory Mother         CPAP for sleep apnea     Multiple Sclerosis Mother      Pulmonary Embolism Mother         secondary to flying long periods of time and a recent fracture     Hypertension Father      Lipids Father      Ulcerative Colitis Father      Allergies Sister         2nd oldest     Asthma Sister         oldest     Psychotic Disorder Sister         depression (s/p an amputation at age 3)     Respiratory Sister         CPAP for sleep apnea     Diabetes Sister         adult-onset     Post-Traumatic Stress Disorder (PTSD) Brother         service connected     Depression Brother         4th oldest     Crohn's Disease Brother      Inflammatory Bowel Disease Brother 38        Chron's     Hypertension Brother         3rd oldest     Cerebrovascular Disease Maternal Grandmother      Cancer Maternal Grandmother         ovarian     Gynecology Maternal Grandmother         ovarian ca     Hypertension Paternal Grandmother      Hypertension Paternal Grandfather      Diabetes Maternal Uncle         type 2       Social History     Socioeconomic History     Marital status:      Spouse name: Errol     Number of children: 1     Years of education: Not on file     Highest education level: Not on file   Occupational History     Occupation:      Comment: [employer]   Tobacco Use     Smoking status: Former     Current packs/day: 0.50     Types: Cigarettes     Smokeless tobacco: Never   Vaping Use     Vaping status: Every Day   Substance and Sexual Activity     Alcohol use: Yes     Alcohol/week: 0.0 standard drinks of alcohol      Comment: 2x a month not while pregnant     Drug use: No     Sexual activity: Yes     Partners: Male     Comment:  had a vasectomy   Other Topics Concern      Service No     Blood Transfusions No     Caffeine Concern No     Comment: pop- 1-2 times qweek     Occupational Exposure Yes     Comment: TB     Hobby Hazards No     Sleep Concern No     Stress Concern No     Weight Concern No     Special Diet Yes     Comment: lactose intol     Back Care No     Exercise Yes     Comment: curves     Bike Helmet No     Seat Belt Yes     Self-Exams Yes     Parent/sibling w/ CABG, MI or angioplasty before 65F 55M? Not Asked   Social History Narrative    Lives in [place] with Errol and their daughter Teri.  They both smoke.  She is trying to quit.  Discussed risks of exposure to secondhand smoke.  Advised about toxoplasmosis precautions.       Social Determinants of Health     Financial Resource Strain: Low Risk  (7/16/2020)    Overall Financial Resource Strain (CARDIA)      Difficulty of Paying Living Expenses: Not hard at all   Food Insecurity: No Food Insecurity (7/16/2020)    Hunger Vital Sign      Worried About Running Out of Food in the Last Year: Never true      Ran Out of Food in the Last Year: Never true   Transportation Needs: No Transportation Needs (7/16/2020)    PRAPARE - Transportation      Lack of Transportation (Medical): No      Lack of Transportation (Non-Medical): No   Physical Activity: Insufficiently Active (7/16/2020)    Exercise Vital Sign      Days of Exercise per Week: 7 days      Minutes of Exercise per Session: 20 min   Stress: Stress Concern Present (7/16/2020)    [employer] [employer] - Occupational Stress Questionnaire      Feeling of Stress : Rather much   Social Connections: Moderately Integrated (7/16/2020)    Social Connection and Isolation Panel [NHANES]      Frequency of Communication with Friends and Family: More than three times a week      Frequency of Social  Gatherings with Friends and Family: Once a week      Attends Anglican Services: Never      Active Member of Clubs or Organizations: Yes      Attends Club or Organization Meetings: 1 to 4 times per year      Marital Status:    Interpersonal Safety: Low Risk  (3/14/2024)    Interpersonal Safety      Do you feel physically and emotionally safe where you currently live?: Yes      Within the past 12 months, have you been hit, slapped, kicked or otherwise physically hurt by someone?: No      Within the past 12 months, have you been humiliated or emotionally abused in other ways by your partner or ex-partner?: No   Housing Stability: Not on file       Current Outpatient Medications   Medication Sig Dispense Refill     albuterol (PROAIR HFA/PROVENTIL HFA/VENTOLIN HFA) 108 (90 Base) MCG/ACT inhaler Inhale 1-2 puffs into the lungs every 6 hours 18 g 1     FLUoxetine (PROZAC) 10 MG capsule Take 1 capsule (10 mg) by mouth daily 90 capsule 1     hydrochlorothiazide (HYDRODIURIL) 25 MG tablet Take 1 tablet (25 mg) by mouth daily 90 tablet 3     lisinopril (ZESTRIL) 20 MG tablet TAKE ONE TABLET BY MOUTH ONCE DAILY 90 tablet 3     metoprolol succinate ER (TOPROL XL) 100 MG 24 hr tablet TAKE ONE TABLET BY MOUTH ONCE DAILY 90 tablet 3     montelukast (SINGULAIR) 10 MG tablet TAKE ONE TABLET BY MOUTH AT BEDTIME 90 tablet 0     omeprazole (PRILOSEC) 20 MG DR capsule TAKE 1 CAPSULE BY MOUTH ONCE DAILY 90 capsule 1     clobetasol (TEMOVATE) 0.05 % GEL topical gel Apply topically 2 times daily (Patient not taking: Reported on 12/22/2022) 60 g 1     Multiple Vitamins-Minerals (MULTIVITAMIN WOMEN PO)  (Patient not taking: Reported on 5/31/2024)       varenicline (CHANTIX IVAN) 0.5 MG X 11 & 1 MG X 42 tablet Take 0.5 mg tab daily for 3 days, THEN 0.5 mg tab twice daily for 4 days, THEN 1 mg twice daily. (Patient not taking: Reported on 5/31/2024) 53 tablet 0       Medications and history reviewed    Physical exam:  Vitals: /78  "(BP Location: Left arm, Patient Position: Chair, Cuff Size: Adult Large)   Pulse 52   Temp 97.8  F (36.6  C) (Temporal)   Resp 16   Ht 1.676 m (5' 6\")   Wt 121.6 kg (268 lb)   SpO2 98%   BMI 43.26 kg/m    BMI= Body mass index is 43.26 kg/m .    Constitutional: alert, non-distressed   Head: normo-cephalic, atraumatic   Cardiovascular: RRR  Respiratory: equal chest rise, good respiratory effort, no audible wheeze  Gastrointestinal: Soft, non-tender, non distended, no masses or hernias palpated   : deferred  Musculoskeletal: moves all extremities   Skin: no suspicious lesions or rashes   Psychiatric: mentation appears normal, affect appropriate   Patient able to get up on table without difficulty.    Labs show:  No results found for this or any previous visit (from the past 24 hour(s)).    Imaging shows:  Recent Results (from the past 744 hour(s))   NM Hepatobiliary Scan with GB EF and/or Pharm    Narrative    EXAM: NM HEPATOBILIARY SCAN WITH GB EF AND/OR PHARM  LOCATION: [facility]  DATE: 5/22/2024    INDICATION: Right upper quadrant abdominal pain  COMPARISON: Abdominal ultrasound dated 3/21/2024.  TECHNIQUE: 4.5 mCi of technetium-99m mebrofenin, IV. Anterior planar imaging of the abdomen. 2.4 mcg of cholecystokinin analog, IV. Gallbladder imaging for 30-60 minutes.    FINDINGS: Normal radionuclide activity in liver, gallbladder, bile ducts, and small bowel. No evidence of cystic or common duct obstruction or intrinsic liver disease. Gallbladder ejection fraction measures 11% (Normal range = 35% or greater).      Impression    IMPRESSION:     Findings suspicious for biliary dyskinesia.        Assessment:     ICD-10-CM    1. Biliary dyskinesia  K82.8 Case Request: CHOLECYSTECTOMY, ROBOT-ASSISTED, LAPAROSCOPIC, USING DA [place] XI     Case Request: CHOLECYSTECTOMY, ROBOT-ASSISTED, LAPAROSCOPIC, USING DA [place] XI      2. Abnormal biliary HIDA scan  R94.8 Adult General Surg Referral "      3. RUQ abdominal pain  R10.11 Adult General Surg Referral        Plan: We discussed biliary dyskinesia and other gallbladder disease.  I recommend robot-assisted laparoscopic cholecystectomy. Discussed imaging findings and what to expect with surgery. Risks of bleeding, infection, anesthesia complications, conversion to open, injury to nearby structures and bile duct injury all discussed.   We went over my discharge instructions and post-op restrictions.  Patient questions answered and we will schedule the procedure.    45 minutes spent by me on the date of the encounter doing chart review, history and exam, documentation and further activities per the note    Ashvin Collier, DO      Again, thank you for allowing me to participate in the care of your patient.        Sincerely,        Ashvin Collier, DO

## 2024-05-31 NOTE — PROGRESS NOTES
Patient seen in consultation for biliary dyskinesia by Zechariah Mcdonald    HPI:  Patient is a 38 year old female with several year history of upper abdominal pain.  She has had this evaluated over the years but no etiology has been found.  She does endorse postprandial nature to her symptoms at times.  She says laying flat sometimes alleviates the pain.  She does also experience heartburn but this is different.  Recent HIDA scan showed decreased ejection fraction consistent with biliary dyskinesia.  She did endorse reproduction of symptoms but this happened after the exam.  She has had a  section in the past.    Review Of Systems    Skin: negative  Ears/Nose/Throat: negative  Respiratory: No shortness of breath, dyspnea on exertion, cough, or hemoptysis  Cardiovascular: negative  Gastrointestinal: negative  Genitourinary: negative  Musculoskeletal: negative  Neurologic: negative  Hematologic/Lymphatic/Immunologic: negative  Endocrine: negative      Past Medical History:   Diagnosis Date    Gastro-oesophageal reflux disease     Heartburn during pregnancy in third trimester 2017    Hypertension     Lactose intolerance     PIH (pregnancy induced hypertension) 2017    Rh(-), needs rhogam at 28 wks gestation 2017    S/P  section 2017       Past Surgical History:   Procedure Laterality Date     SECTION N/A 2017    Procedure:  SECTION;   Section;  Surgeon: eZchariah Mcdonald MD;  Location: [facility] OR    COLONOSCOPY N/A 2023    Procedure: COLONOSCOPY, WITH POLYPECTOMY AND BIOPSY;  Surgeon: Ashvin Collier DO;  Location: [facility]    HC EXCIS PRIMARY GANGLION WRIST  3/24/2004    Excision, volar wrist ganglion, left.    HC EXCIS RECURRENT GANGLION WRIST  2006    Recurrent volar wrist ganglion, left.    OPEN REDUCTION INTERNAL FIXATION WRIST      Cadavar bone       Family History   Problem Relation Age of Onset    Lipids Mother     Musculoskeletal  Disorder Mother         ms    Allergies Mother     Depression Mother     Genitourinary Problems Mother     Respiratory Mother         CPAP for sleep apnea    Multiple Sclerosis Mother     Pulmonary Embolism Mother         secondary to flying long periods of time and a recent fracture    Hypertension Father     Lipids Father     Ulcerative Colitis Father     Allergies Sister         2nd oldest    Asthma Sister         oldest    Psychotic Disorder Sister         depression (s/p an amputation at age 3)    Respiratory Sister         CPAP for sleep apnea    Diabetes Sister         adult-onset    Post-Traumatic Stress Disorder (PTSD) Brother         service connected    Depression Brother         4th oldest    Crohn's Disease Brother     Inflammatory Bowel Disease Brother 38        Chron's    Hypertension Brother         3rd oldest    Cerebrovascular Disease Maternal Grandmother     Cancer Maternal Grandmother         ovarian    Gynecology Maternal Grandmother         ovarian ca    Hypertension Paternal Grandmother     Hypertension Paternal Grandfather     Diabetes Maternal Uncle         type 2       Social History     Socioeconomic History    Marital status:      Spouse name: Errol    Number of children: 1    Years of education: Not on file    Highest education level: Not on file   Occupational History    Occupation:      Comment: [employer]   Tobacco Use    Smoking status: Former     Current packs/day: 0.50     Types: Cigarettes    Smokeless tobacco: Never   Vaping Use    Vaping status: Every Day   Substance and Sexual Activity    Alcohol use: Yes     Alcohol/week: 0.0 standard drinks of alcohol     Comment: 2x a month not while pregnant    Drug use: No    Sexual activity: Yes     Partners: Male     Comment:  had a vasectomy   Other Topics Concern     Service No    Blood Transfusions No    Caffeine Concern No     Comment: pop- 1-2 times qweek    Occupational Exposure Yes      Comment: TB    Hobby Hazards No    Sleep Concern No    Stress Concern No    Weight Concern No    Special Diet Yes     Comment: lactose intol    Back Care No    Exercise Yes     Comment: curves    Bike Helmet No    Seat Belt Yes    Self-Exams Yes    Parent/sibling w/ CABG, MI or angioplasty before 65F 55M? Not Asked   Social History Narrative    Lives in [place] with Errol and their daughter Teri.  They both smoke.  She is trying to quit.  Discussed risks of exposure to secondhand smoke.  Advised about toxoplasmosis precautions.       Social Determinants of Health     Financial Resource Strain: Low Risk  (7/16/2020)    Overall Financial Resource Strain (CARDIA)     Difficulty of Paying Living Expenses: Not hard at all   Food Insecurity: No Food Insecurity (7/16/2020)    Hunger Vital Sign     Worried About Running Out of Food in the Last Year: Never true     Ran Out of Food in the Last Year: Never true   Transportation Needs: No Transportation Needs (7/16/2020)    PRAPARE - Transportation     Lack of Transportation (Medical): No     Lack of Transportation (Non-Medical): No   Physical Activity: Insufficiently Active (7/16/2020)    Exercise Vital Sign     Days of Exercise per Week: 7 days     Minutes of Exercise per Session: 20 min   Stress: Stress Concern Present (7/16/2020)    [employer] - Occupational Stress Questionnaire     Feeling of Stress : Rather much   Social Connections: Moderately Integrated (7/16/2020)    Social Connection and Isolation Panel [NHANES]     Frequency of Communication with Friends and Family: More than three times a week     Frequency of Social Gatherings with Friends and Family: Once a week     Attends Scientologist Services: Never     Active Member of Clubs or Organizations: Yes     Attends Club or Organization Meetings: 1 to 4 times per year     Marital Status:    Interpersonal Safety: Low Risk  (3/14/2024)    Interpersonal Safety     Do you feel  "physically and emotionally safe where you currently live?: Yes     Within the past 12 months, have you been hit, slapped, kicked or otherwise physically hurt by someone?: No     Within the past 12 months, have you been humiliated or emotionally abused in other ways by your partner or ex-partner?: No   Housing Stability: Not on file       Current Outpatient Medications   Medication Sig Dispense Refill    albuterol (PROAIR HFA/PROVENTIL HFA/VENTOLIN HFA) 108 (90 Base) MCG/ACT inhaler Inhale 1-2 puffs into the lungs every 6 hours 18 g 1    FLUoxetine (PROZAC) 10 MG capsule Take 1 capsule (10 mg) by mouth daily 90 capsule 1    hydrochlorothiazide (HYDRODIURIL) 25 MG tablet Take 1 tablet (25 mg) by mouth daily 90 tablet 3    lisinopril (ZESTRIL) 20 MG tablet TAKE ONE TABLET BY MOUTH ONCE DAILY 90 tablet 3    metoprolol succinate ER (TOPROL XL) 100 MG 24 hr tablet TAKE ONE TABLET BY MOUTH ONCE DAILY 90 tablet 3    montelukast (SINGULAIR) 10 MG tablet TAKE ONE TABLET BY MOUTH AT BEDTIME 90 tablet 0    omeprazole (PRILOSEC) 20 MG DR capsule TAKE 1 CAPSULE BY MOUTH ONCE DAILY 90 capsule 1    clobetasol (TEMOVATE) 0.05 % GEL topical gel Apply topically 2 times daily (Patient not taking: Reported on 12/22/2022) 60 g 1    Multiple Vitamins-Minerals (MULTIVITAMIN WOMEN PO)  (Patient not taking: Reported on 5/31/2024)      varenicline (CHANTIX IVAN) 0.5 MG X 11 & 1 MG X 42 tablet Take 0.5 mg tab daily for 3 days, THEN 0.5 mg tab twice daily for 4 days, THEN 1 mg twice daily. (Patient not taking: Reported on 5/31/2024) 53 tablet 0       Medications and history reviewed    Physical exam:  Vitals: /78 (BP Location: Left arm, Patient Position: Chair, Cuff Size: Adult Large)   Pulse 52   Temp 97.8  F (36.6  C) (Temporal)   Resp 16   Ht 1.676 m (5' 6\")   Wt 121.6 kg (268 lb)   SpO2 98%   BMI 43.26 kg/m    BMI= Body mass index is 43.26 kg/m .    Constitutional: alert, non-distressed   Head: normo-cephalic, atraumatic "   Cardiovascular: RRR  Respiratory: equal chest rise, good respiratory effort, no audible wheeze  Gastrointestinal: Soft, non-tender, non distended, no masses or hernias palpated   : deferred  Musculoskeletal: moves all extremities   Skin: no suspicious lesions or rashes   Psychiatric: mentation appears normal, affect appropriate   Patient able to get up on table without difficulty.    Labs show:  No results found for this or any previous visit (from the past 24 hour(s)).    Imaging shows:  Recent Results (from the past 744 hour(s))   NM Hepatobiliary Scan with GB EF and/or Pharm    Narrative    EXAM: NM HEPATOBILIARY SCAN WITH GB EF AND/OR PHARM  LOCATION: [facility]  DATE: 5/22/2024    INDICATION: Right upper quadrant abdominal pain  COMPARISON: Abdominal ultrasound dated 3/21/2024.  TECHNIQUE: 4.5 mCi of technetium-99m mebrofenin, IV. Anterior planar imaging of the abdomen. 2.4 mcg of cholecystokinin analog, IV. Gallbladder imaging for 30-60 minutes.    FINDINGS: Normal radionuclide activity in liver, gallbladder, bile ducts, and small bowel. No evidence of cystic or common duct obstruction or intrinsic liver disease. Gallbladder ejection fraction measures 11% (Normal range = 35% or greater).      Impression    IMPRESSION:     Findings suspicious for biliary dyskinesia.        Assessment:     ICD-10-CM    1. Biliary dyskinesia  K82.8 Case Request: CHOLECYSTECTOMY, ROBOT-ASSISTED, LAPAROSCOPIC, USING DA [place] XI     Case Request: CHOLECYSTECTOMY, ROBOT-ASSISTED, LAPAROSCOPIC, USING [facility] [place] XI      2. Abnormal biliary HIDA scan  R94.8 Adult General Surg Referral      3. RUQ abdominal pain  R10.11 Adult General Surg Referral        Plan: We discussed biliary dyskinesia and other gallbladder disease.  I recommend robot-assisted laparoscopic cholecystectomy. Discussed imaging findings and what to expect with surgery. Risks of bleeding, infection, anesthesia complications, conversion  to open, injury to nearby structures and bile duct injury all discussed.   We went over my discharge instructions and post-op restrictions.  Patient questions answered and we will schedule the procedure.    45 minutes spent by me on the date of the encounter doing chart review, history and exam, documentation and further activities per the note    Ashvin Collier, DO

## 2024-06-03 ENCOUNTER — TELEPHONE (OUTPATIENT)
Dept: SURGERY | Facility: CLINIC | Age: 39
End: 2024-06-03
Payer: COMMERCIAL

## 2024-06-03 NOTE — TELEPHONE ENCOUNTER
Type of surgery: CHOLECYSTECTOMY, ROBOT-ASSISTED, LAPAROSCOPIC, USING DA [place] XI   Location of surgery: [facility] OR  Date and time of surgery: 6/28  Surgeon: Amira  Pre-Op Appt Date: 6/21  Post-Op Appt Date: 7/12   Packet sent out: Yes  Pre-cert/Authorization completed:  Not Applicable  Date: na

## 2024-06-13 DIAGNOSIS — N94.3 PMS (PREMENSTRUAL SYNDROME): ICD-10-CM

## 2024-06-13 RX ORDER — FLUOXETINE 10 MG/1
10 CAPSULE ORAL DAILY
Qty: 90 CAPSULE | Refills: 1 | OUTPATIENT
Start: 2024-06-13

## 2024-06-21 ENCOUNTER — OFFICE VISIT (OUTPATIENT)
Dept: FAMILY MEDICINE | Facility: CLINIC | Age: 39
End: 2024-06-21
Payer: COMMERCIAL

## 2024-06-21 VITALS
WEIGHT: 273.8 LBS | TEMPERATURE: 98.5 F | SYSTOLIC BLOOD PRESSURE: 121 MMHG | HEART RATE: 62 BPM | OXYGEN SATURATION: 100 % | RESPIRATION RATE: 11 BRPM | HEIGHT: 66 IN | BODY MASS INDEX: 44 KG/M2 | DIASTOLIC BLOOD PRESSURE: 76 MMHG

## 2024-06-21 DIAGNOSIS — Z01.818 PREOP GENERAL PHYSICAL EXAM: Primary | ICD-10-CM

## 2024-06-21 DIAGNOSIS — R10.11 ABDOMINAL PAIN, RIGHT UPPER QUADRANT: ICD-10-CM

## 2024-06-21 LAB
HGB BLD-MCNC: 13.4 G/DL (ref 11.7–15.7)
POTASSIUM SERPL-SCNC: 4 MMOL/L (ref 3.4–5.3)

## 2024-06-21 PROCEDURE — 99214 OFFICE O/P EST MOD 30 MIN: CPT

## 2024-06-21 PROCEDURE — 36415 COLL VENOUS BLD VENIPUNCTURE: CPT

## 2024-06-21 PROCEDURE — 85018 HEMOGLOBIN: CPT

## 2024-06-21 PROCEDURE — 84132 ASSAY OF SERUM POTASSIUM: CPT

## 2024-06-21 ASSESSMENT — PAIN SCALES - GENERAL: PAINLEVEL: MILD PAIN (2)

## 2024-06-21 NOTE — PATIENT INSTRUCTIONS
Preop completed today     Lab work    The morning of your procedure, do not take the following medications: Hydrochlorothiazide, and lisinopril    You may take all of your other prescription medications as directed     Contact your surgical team with any questions or concerns     Do not eat after midnight the night prior to procedure.  You may take a small sip of water in the morning if needed.     Follow-up if you become ill or have a change in health status between now and your procedure     Follow-up with any questions or concerns     Follow-up with any new, worsening, or persistent symptoms     Go to the ER or call 911 in the case of an emergency     Instructed the patient to cancel surgery and reschedule if develops high fever or is vomiting 1-3 days before surgery. Preoperative History and Physical is good for 30 days. May need additional blood work--contact primary provider.    Other Pre-Op Orders for when to stop eating the night before surgery, time of surgery, where & when to check in, parking, and any other specific pre-operative orders come from the surgeon's office. You should hear from them at least one day before surgery if not sooner for these specific instructions.    STOP any types of over the counter blood thinning medications (such as aspirin, Motrin/ibuprofen, Aleve/naproxen, vitamin E, or fish oil) 1 week prior to surgery. Tylenol (or acetaminophen) is okay to take for pain if needed   Recommend no alcohol consumption at least 48 hours prior to surgery    Patient understood and verbally consented to the treatment plan. Discussed symptoms that would warrant an urgent or emergent visit. All of the patients' questions were answered. Patient was instructed to contact the clinic if questions or concerns arise. Recommend follow up appointments if symptoms worsen or fail to improve. Recommend follow up as needed. Recommend ER in the case of an emergency.    Brooke Cole PA-C    Please note: Voice  recognition software may have been used in preparing this note, unintended word substitutions may be present.

## 2024-06-21 NOTE — PROGRESS NOTES
Preoperative Evaluation  [address] [address]  Phone: [phone]  Fax: [phone]  Primary Provider: Zechariah Mcdonald MD, MD  Pre-op Performing Provider: Brooke Cole PA-C  Jun 21, 2024 6/21/2024   Surgical Information   What procedure is being done? CHOLECYSTECTOMY, ROBOT-ASSISTED, LAPAROSCOPIC, USING DA A[place] XI    Facility or Hospital where procedure/surgery will be performed: [facility]   Who is doing the procedure / surgery? Ashvin Collier, DO    Date of surgery / procedure: 6/28/2024    Time of surgery / procedure: 3:30 PM    Where do you plan to recover after surgery? at home with family        Fax number for surgical facility: Note does not need to be faxed, will be available electronically in Epic.    Assessment & Plan     The proposed surgical procedure is considered INTERMEDIATE risk.    Preop general physical exam  - Hemoglobin; Future  - Potassium; Future  - Hemoglobin  - Potassium      I spent a total of 13 minutes on the day of the visit.   Time spent by me doing chart review, history and exam, documentation and further activities per the note  Possible Sleep Apnea:            - No identified additional risk factors other than previously addressed         Recommendation  Approval given to proceed with proposed procedure, without further diagnostic evaluation.    Patient instructions:  Preop completed today     Lab work    The morning of your procedure, do not take the following medications: Hydrochlorothiazide, and lisinopril    You may take all of your other prescription medications as directed     Contact your surgical team with any questions or concerns     Do not eat after midnight the night prior to procedure.  You may take a small sip of water in the morning if needed.     Follow-up if you become ill or have a change in health status between now and your procedure      Follow-up with any questions or concerns     Follow-up with any new, worsening, or persistent symptoms     Go to the ER or call 911 in the case of an emergency     Instructed the patient to cancel surgery and reschedule if develops high fever or is vomiting 1-3 days before surgery. Preoperative History and Physical is good for 30 days. May need additional blood work--contact primary provider.    Other Pre-Op Orders for when to stop eating the night before surgery, time of surgery, where & when to check in, parking, and any other specific pre-operative orders come from the surgeon's office. You should hear from them at least one day before surgery if not sooner for these specific instructions.    STOP any types of over the counter blood thinning medications (such as aspirin, Motrin/ibuprofen, Aleve/naproxen, vitamin E, or fish oil) 1 week prior to surgery. Tylenol (or acetaminophen) is okay to take for pain if needed   Recommend no alcohol consumption at least 48 hours prior to surgery    Patient understood and verbally consented to the treatment plan. Discussed symptoms that would warrant an urgent or emergent visit. All of the patients' questions were answered. Patient was instructed to contact the clinic if questions or concerns arise. Recommend follow up appointments if symptoms worsen or fail to improve. Recommend follow up as needed. Recommend ER in the case of an emergency.    Brooke Cole PA-C    Please note: Voice recognition software may have been used in preparing this note, unintended word substitutions may be present.        Cali Mccarthy is a 38 year old, presenting for the following:  Pre-Op Exam          6/21/2024     1:46 PM   Additional Questions   Roomed by Romy         6/21/2024     1:46 PM   Patient Reported Additional Medications   Patient reports taking the following new medications none     HPI related to upcoming procedure: Laparoscopic cholecystectomy planned        6/21/2024    Pre-Op Questionnaire   Have you ever had a heart attack or stroke? No   Have you ever had surgery on your heart or blood vessels, such as a stent placement, a coronary artery bypass, or surgery on an artery in your head, neck, heart, or legs? No   Do you have chest pain with activity? No   Do you have a history of heart failure? No   Do you currently have a cold, bronchitis or symptoms of other infection? No   Do you have a cough, shortness of breath, or wheezing? No   Do you or anyone in your family have previous history of blood clots? (!) YES , mother with history of PE   Do you or does anyone in your family have a serious bleeding problem such as prolonged bleeding following surgeries or cuts? No   Have you ever had problems with anemia or been told to take iron pills? No   Have you had any abnormal blood loss such as black, tarry or bloody stools, or abnormal vaginal bleeding? No   Have you ever had a blood transfusion? No   Are you willing to have a blood transfusion if it is medically needed before, during, or after your surgery? Yes   Have you or any of your relatives ever had problems with anesthesia? (!) YES , sister (unsure what reaction she had)   Do you have sleep apnea, excessive snoring or daytime drowsiness? (!) YES, snoring   Do you have a CPAP machine? (!) NO , no sleep apnea   Do you have any artifical heart valves or other implanted medical devices like a pacemaker, defibrillator, or continuous glucose monitor? No   Do you have artificial joints? No   Are you allergic to latex? No        Health Care Directive  Patient does not have a Health Care Directive or Living Will: Discussed advance care planning with patient; information given to patient to review.    Preoperative Review of    reviewed - no record of controlled substances prescribed.          Patient Active Problem List    Diagnosis Date Noted    Tobacco use disorder 03/14/2024     Priority: Medium    Abdominal pain, generalized  2024     Priority: Medium    Abdominal pain, right upper quadrant 2024     Priority: Medium    Lower abdominal pain 2023     Priority: Medium    LINWOOD 2020     Priority: Medium    PMS (premenstrual syndrome) 2020     Priority: Medium    Paresthesia of foot, bilateral 2020     Priority: Medium    Morbid obesity (H) 2019     Priority: Medium    Benign essential hypertension, postpartum 2017     Priority: Medium    Benign essential hypertension 2017     Priority: Medium    Heartburn 2017     Priority: Medium    Lactose intolerances 2013     Priority: Medium     Problem list name updated by automated process. Provider to review and confirm      Esophageal reflux 2006     Priority: Medium      Past Medical History:   Diagnosis Date    Gastro-oesophageal reflux disease     Heartburn during pregnancy in third trimester 2017    Hypertension     Lactose intolerance     PIH (pregnancy induced hypertension) 2017    Rh(-), needs rhogam at 28 wks gestation 2017    S/P  section 2017     Past Surgical History:   Procedure Laterality Date     SECTION N/A 2017    Procedure:  SECTION;   Section;  Surgeon: Zechariah Mcdonald MD;  Location: [facility]    COLONOSCOPY N/A 2023    Procedure: COLONOSCOPY, WITH POLYPECTOMY AND BIOPSY;  Surgeon: Ashvin Collier DO;  Location: [facility]    HC EXCIS PRIMARY GANGLION WRIST  3/24/2004    Excision, volar wrist ganglion, left.    HC EXCIS RECURRENT GANGLION WRIST  2006    Recurrent volar wrist ganglion, left.    OPEN REDUCTION INTERNAL FIXATION WRIST      Cadavar bone     Current Outpatient Medications   Medication Sig Dispense Refill    albuterol (PROAIR HFA/PROVENTIL HFA/VENTOLIN HFA) 108 (90 Base) MCG/ACT inhaler Inhale 1-2 puffs into the lungs every 6 hours 18 g 1    FLUoxetine (PROZAC) 10 MG capsule Take 1 capsule (10 mg) by mouth daily 90 capsule 1     hydrochlorothiazide (HYDRODIURIL) 25 MG tablet Take 1 tablet (25 mg) by mouth daily 90 tablet 3    lisinopril (ZESTRIL) 20 MG tablet TAKE ONE TABLET BY MOUTH ONCE DAILY 90 tablet 3    metoprolol succinate ER (TOPROL XL) 100 MG 24 hr tablet TAKE ONE TABLET BY MOUTH ONCE DAILY 90 tablet 3    montelukast (SINGULAIR) 10 MG tablet TAKE ONE TABLET BY MOUTH AT BEDTIME 90 tablet 0    omeprazole (PRILOSEC) 20 MG DR capsule TAKE 1 CAPSULE BY MOUTH ONCE DAILY 90 capsule 1       Allergies   Allergen Reactions    Fruit Extracts Hives     Fruits from trees only if she touches them    Milk [Milk (Cow)] GI Disturbance    No Known Drug Allergy         Social History     Tobacco Use    Smoking status: Former     Current packs/day: 0.50     Types: Cigarettes    Smokeless tobacco: Never   Substance Use Topics    Alcohol use: Yes     Alcohol/week: 0.0 standard drinks of alcohol     Comment: 2x a month not while pregnant     Family History   Problem Relation Age of Onset    Lipids Mother     Musculoskeletal Disorder Mother         ms    Allergies Mother     Depression Mother     Genitourinary Problems Mother     Respiratory Mother         CPAP for sleep apnea    Multiple Sclerosis Mother     Pulmonary Embolism Mother         secondary to flying long periods of time and a recent fracture    Hypertension Father     Lipids Father     Ulcerative Colitis Father     Allergies Sister         2nd oldest    Asthma Sister         oldest    Psychotic Disorder Sister         depression (s/p an amputation at age 3)    Respiratory Sister         CPAP for sleep apnea    Diabetes Sister         adult-onset    Post-Traumatic Stress Disorder (PTSD) Brother         service connected    Depression Brother         4th oldest    Crohn's Disease Brother     Inflammatory Bowel Disease Brother 38        Chron's    Hypertension Brother         3rd oldest    Cerebrovascular Disease Maternal Grandmother     Cancer Maternal Grandmother         ovarian    Gynecology  "Maternal Grandmother         ovarian ca    Hypertension Paternal Grandmother     Hypertension Paternal Grandfather     Diabetes Maternal Uncle         type 2     History   Drug Use No             Review of Systems  Constitutional, HEENT, cardiovascular, pulmonary, GI, , musculoskeletal, neuro, skin, endocrine and psych systems are negative, except as otherwise noted.    Objective    /76 (BP Location: Left arm, Patient Position: Sitting, Cuff Size: Adult Large)   Pulse 62   Temp 98.5  F (36.9  C) (Oral)   Resp 11   Ht 1.676 m (5' 6\")   Wt 124.2 kg (273 lb 12.8 oz)   LMP 05/23/2024 (Approximate)   SpO2 100%   Breastfeeding No   BMI 44.19 kg/m     Estimated body mass index is 44.19 kg/m  as calculated from the following:    Height as of this encounter: 1.676 m (5' 6\").    Weight as of this encounter: 124.2 kg (273 lb 12.8 oz).  Physical Exam  GENERAL: alert and no distress  EYES: Eyes grossly normal to inspection, PERRL and conjunctivae and sclerae normal  HENT: ear canals and TM's normal, nose and mouth without ulcers or lesions  RESP: lungs clear to auscultation - no rales, rhonchi or wheezes  CV: regular rate and rhythm, normal S1 S2, no S3 or S4, no murmur, click or rub, no peripheral edema  SKIN: no suspicious lesions or rashes  NEURO: Normal strength and tone, mentation intact and speech normal  PSYCH: mentation appears normal, affect normal/bright    Recent Labs   Lab Test 03/14/24  1144      POTASSIUM 4.2   CR 0.87        Diagnostics  No results found for this or any previous visit (from the past 720 hour(s)).   No EKG required, no history of coronary heart disease, significant arrhythmia, peripheral arterial disease or other structural heart disease.    Revised Cardiac Risk Index (RCRI)  The patient has the following serious cardiovascular risks for perioperative complications:   - No serious cardiac risks = 0 points     RCRI Interpretation: 0 points: Class I (very low risk - 0.4% " complication rate)         Signed Electronically by: Brooke Cole PA-C  Copy of this evaluation report is provided to requesting physician.

## 2024-06-27 ENCOUNTER — ANESTHESIA EVENT (OUTPATIENT)
Dept: SURGERY | Facility: CLINIC | Age: 39
End: 2024-06-27
Payer: COMMERCIAL

## 2024-06-28 ENCOUNTER — HOSPITAL ENCOUNTER (OUTPATIENT)
Facility: CLINIC | Age: 39
Discharge: HOME | End: 2024-06-28
Attending: SURGERY | Admitting: SURGERY
Payer: COMMERCIAL

## 2024-06-28 ENCOUNTER — ANESTHESIA (OUTPATIENT)
Dept: SURGERY | Facility: CLINIC | Age: 39
End: 2024-06-28
Payer: COMMERCIAL

## 2024-06-28 VITALS
TEMPERATURE: 91.8 F | HEART RATE: 43 BPM | RESPIRATION RATE: 16 BRPM | OXYGEN SATURATION: 98 % | SYSTOLIC BLOOD PRESSURE: 133 MMHG | DIASTOLIC BLOOD PRESSURE: 69 MMHG

## 2024-06-28 DIAGNOSIS — Z90.49 S/P LAPAROSCOPIC CHOLECYSTECTOMY: Primary | ICD-10-CM

## 2024-06-28 PROCEDURE — 250N000009 HC RX 250: Performed by: SURGERY

## 2024-06-28 PROCEDURE — 258N000003 HC RX IP 258 OP 636: Performed by: NURSE ANESTHETIST, CERTIFIED REGISTERED

## 2024-06-28 PROCEDURE — S2900 ROBOTIC SURGICAL SYSTEM: HCPCS | Performed by: SURGERY

## 2024-06-28 PROCEDURE — 250N000011 HC RX IP 250 OP 636: Performed by: SURGERY

## 2024-06-28 PROCEDURE — 710N000012 HC RECOVERY PHASE 2, PER MINUTE: Performed by: SURGERY

## 2024-06-28 PROCEDURE — 710N000010 HC RECOVERY PHASE 1, LEVEL 2, PER MIN: Performed by: SURGERY

## 2024-06-28 PROCEDURE — 250N000009 HC RX 250: Performed by: NURSE ANESTHETIST, CERTIFIED REGISTERED

## 2024-06-28 PROCEDURE — 47562 LAPAROSCOPIC CHOLECYSTECTOMY: CPT | Mod: AS | Performed by: PHYSICIAN ASSISTANT

## 2024-06-28 PROCEDURE — 250N000011 HC RX IP 250 OP 636: Performed by: NURSE ANESTHETIST, CERTIFIED REGISTERED

## 2024-06-28 PROCEDURE — 88304 TISSUE EXAM BY PATHOLOGIST: CPT | Mod: TC | Performed by: SURGERY

## 2024-06-28 PROCEDURE — 360N000080 HC SURGERY LEVEL 7, PER MIN: Performed by: SURGERY

## 2024-06-28 PROCEDURE — 999N000141 HC STATISTIC PRE-PROCEDURE NURSING ASSESSMENT: Performed by: SURGERY

## 2024-06-28 PROCEDURE — 370N000017 HC ANESTHESIA TECHNICAL FEE, PER MIN: Performed by: SURGERY

## 2024-06-28 PROCEDURE — 272N000001 HC OR GENERAL SUPPLY STERILE: Performed by: SURGERY

## 2024-06-28 PROCEDURE — 88304 TISSUE EXAM BY PATHOLOGIST: CPT | Mod: 26 | Performed by: PATHOLOGY

## 2024-06-28 PROCEDURE — 250N000025 HC SEVOFLURANE, PER MIN: Performed by: SURGERY

## 2024-06-28 PROCEDURE — 47562 LAPAROSCOPIC CHOLECYSTECTOMY: CPT | Performed by: SURGERY

## 2024-06-28 PROCEDURE — 250N000013 HC RX MED GY IP 250 OP 250 PS 637: Performed by: NURSE ANESTHETIST, CERTIFIED REGISTERED

## 2024-06-28 RX ORDER — HYDROMORPHONE HYDROCHLORIDE 1 MG/ML
0.2 INJECTION, SOLUTION INTRAMUSCULAR; INTRAVENOUS; SUBCUTANEOUS EVERY 5 MIN PRN
Status: DISCONTINUED | OUTPATIENT
Start: 2024-06-28 | End: 2024-06-28 | Stop reason: HOSPADM

## 2024-06-28 RX ORDER — CEFAZOLIN SODIUM/WATER 3 G/30 ML
3 SYRINGE (ML) INTRAVENOUS SEE ADMIN INSTRUCTIONS
Status: DISCONTINUED | OUTPATIENT
Start: 2024-06-28 | End: 2024-06-28

## 2024-06-28 RX ORDER — LIDOCAINE HYDROCHLORIDE 10 MG/ML
INJECTION, SOLUTION INFILTRATION; PERINEURAL PRN
Status: DISCONTINUED | OUTPATIENT
Start: 2024-06-28 | End: 2024-06-28

## 2024-06-28 RX ORDER — DEXAMETHASONE SODIUM PHOSPHATE 10 MG/ML
4 INJECTION, SOLUTION INTRAMUSCULAR; INTRAVENOUS
Status: DISCONTINUED | OUTPATIENT
Start: 2024-06-28 | End: 2024-06-28 | Stop reason: HOSPADM

## 2024-06-28 RX ORDER — OXYCODONE AND ACETAMINOPHEN 5; 325 MG/1; MG/1
2 TABLET ORAL
Status: DISCONTINUED | OUTPATIENT
Start: 2024-06-28 | End: 2024-06-28 | Stop reason: HOSPADM

## 2024-06-28 RX ORDER — ACETAMINOPHEN 325 MG/1
975 TABLET ORAL
Status: COMPLETED | OUTPATIENT
Start: 2024-06-28 | End: 2024-06-28

## 2024-06-28 RX ORDER — NALOXONE HYDROCHLORIDE 0.4 MG/ML
0.1 INJECTION, SOLUTION INTRAMUSCULAR; INTRAVENOUS; SUBCUTANEOUS
Status: DISCONTINUED | OUTPATIENT
Start: 2024-06-28 | End: 2024-06-28 | Stop reason: HOSPADM

## 2024-06-28 RX ORDER — CEFAZOLIN SODIUM/WATER 3 G/30 ML
3 SYRINGE (ML) INTRAVENOUS
Status: COMPLETED | OUTPATIENT
Start: 2024-06-28 | End: 2024-06-28

## 2024-06-28 RX ORDER — INDOCYANINE GREEN AND WATER 25 MG
1.25 KIT INJECTION ONCE
Status: COMPLETED | OUTPATIENT
Start: 2024-06-28 | End: 2024-06-28

## 2024-06-28 RX ORDER — CEFAZOLIN SODIUM/WATER 3 G/30 ML
3 SYRINGE (ML) INTRAVENOUS
Status: DISCONTINUED | OUTPATIENT
Start: 2024-06-28 | End: 2024-06-28

## 2024-06-28 RX ORDER — FENTANYL CITRATE 50 UG/ML
50 INJECTION, SOLUTION INTRAMUSCULAR; INTRAVENOUS EVERY 5 MIN PRN
Status: DISCONTINUED | OUTPATIENT
Start: 2024-06-28 | End: 2024-06-28 | Stop reason: HOSPADM

## 2024-06-28 RX ORDER — OXYCODONE AND ACETAMINOPHEN 5; 325 MG/1; MG/1
1-2 TABLET ORAL EVERY 4 HOURS PRN
Qty: 12 TABLET | Refills: 0 | Status: SHIPPED | OUTPATIENT
Start: 2024-06-28

## 2024-06-28 RX ORDER — BUPIVACAINE HYDROCHLORIDE AND EPINEPHRINE 2.5; 5 MG/ML; UG/ML
INJECTION, SOLUTION INFILTRATION; PERINEURAL PRN
Status: DISCONTINUED | OUTPATIENT
Start: 2024-06-28 | End: 2024-06-28 | Stop reason: HOSPADM

## 2024-06-28 RX ORDER — HYDROMORPHONE HYDROCHLORIDE 1 MG/ML
0.4 INJECTION, SOLUTION INTRAMUSCULAR; INTRAVENOUS; SUBCUTANEOUS EVERY 5 MIN PRN
Status: DISCONTINUED | OUTPATIENT
Start: 2024-06-28 | End: 2024-06-28 | Stop reason: HOSPADM

## 2024-06-28 RX ORDER — FENTANYL CITRATE 50 UG/ML
INJECTION, SOLUTION INTRAMUSCULAR; INTRAVENOUS PRN
Status: DISCONTINUED | OUTPATIENT
Start: 2024-06-28 | End: 2024-06-28

## 2024-06-28 RX ORDER — CEFAZOLIN SODIUM/WATER 3 G/30 ML
3 SYRINGE (ML) INTRAVENOUS SEE ADMIN INSTRUCTIONS
Status: DISCONTINUED | OUTPATIENT
Start: 2024-06-28 | End: 2024-06-28 | Stop reason: HOSPADM

## 2024-06-28 RX ORDER — SODIUM CHLORIDE, SODIUM LACTATE, POTASSIUM CHLORIDE, CALCIUM CHLORIDE 600; 310; 30; 20 MG/100ML; MG/100ML; MG/100ML; MG/100ML
INJECTION, SOLUTION INTRAVENOUS CONTINUOUS
Status: DISCONTINUED | OUTPATIENT
Start: 2024-06-28 | End: 2024-06-28 | Stop reason: HOSPADM

## 2024-06-28 RX ORDER — PROPOFOL 10 MG/ML
INJECTION, EMULSION INTRAVENOUS PRN
Status: DISCONTINUED | OUTPATIENT
Start: 2024-06-28 | End: 2024-06-28

## 2024-06-28 RX ORDER — GLYCOPYRROLATE 0.2 MG/ML
INJECTION, SOLUTION INTRAMUSCULAR; INTRAVENOUS PRN
Status: DISCONTINUED | OUTPATIENT
Start: 2024-06-28 | End: 2024-06-28

## 2024-06-28 RX ORDER — ONDANSETRON 2 MG/ML
4 INJECTION INTRAMUSCULAR; INTRAVENOUS EVERY 30 MIN PRN
Status: DISCONTINUED | OUTPATIENT
Start: 2024-06-28 | End: 2024-06-28 | Stop reason: HOSPADM

## 2024-06-28 RX ORDER — ONDANSETRON 2 MG/ML
INJECTION INTRAMUSCULAR; INTRAVENOUS PRN
Status: DISCONTINUED | OUTPATIENT
Start: 2024-06-28 | End: 2024-06-28

## 2024-06-28 RX ORDER — DEXAMETHASONE SODIUM PHOSPHATE 4 MG/ML
INJECTION, SOLUTION INTRA-ARTICULAR; INTRALESIONAL; INTRAMUSCULAR; INTRAVENOUS; SOFT TISSUE PRN
Status: DISCONTINUED | OUTPATIENT
Start: 2024-06-28 | End: 2024-06-28

## 2024-06-28 RX ORDER — PROPOFOL 10 MG/ML
INJECTION, EMULSION INTRAVENOUS CONTINUOUS PRN
Status: DISCONTINUED | OUTPATIENT
Start: 2024-06-28 | End: 2024-06-28

## 2024-06-28 RX ORDER — FENTANYL CITRATE 50 UG/ML
25 INJECTION, SOLUTION INTRAMUSCULAR; INTRAVENOUS EVERY 5 MIN PRN
Status: DISCONTINUED | OUTPATIENT
Start: 2024-06-28 | End: 2024-06-28 | Stop reason: HOSPADM

## 2024-06-28 RX ORDER — ONDANSETRON 4 MG/1
4 TABLET, ORALLY DISINTEGRATING ORAL EVERY 30 MIN PRN
Status: DISCONTINUED | OUTPATIENT
Start: 2024-06-28 | End: 2024-06-28 | Stop reason: HOSPADM

## 2024-06-28 RX ORDER — KETOROLAC TROMETHAMINE 30 MG/ML
INJECTION, SOLUTION INTRAMUSCULAR; INTRAVENOUS PRN
Status: DISCONTINUED | OUTPATIENT
Start: 2024-06-28 | End: 2024-06-28

## 2024-06-28 RX ADMIN — FENTANYL CITRATE 50 MCG: 50 INJECTION, SOLUTION INTRAMUSCULAR; INTRAVENOUS at 15:10

## 2024-06-28 RX ADMIN — FENTANYL CITRATE 50 MCG: 50 INJECTION INTRAMUSCULAR; INTRAVENOUS at 14:37

## 2024-06-28 RX ADMIN — SODIUM CHLORIDE, POTASSIUM CHLORIDE, SODIUM LACTATE AND CALCIUM CHLORIDE: 600; 310; 30; 20 INJECTION, SOLUTION INTRAVENOUS at 12:59

## 2024-06-28 RX ADMIN — Medication 1.25 MG: at 13:10

## 2024-06-28 RX ADMIN — Medication 3 G: at 13:38

## 2024-06-28 RX ADMIN — ONDANSETRON 4 MG: 2 INJECTION INTRAMUSCULAR; INTRAVENOUS at 14:24

## 2024-06-28 RX ADMIN — DEXAMETHASONE SODIUM PHOSPHATE 6 MG: 4 INJECTION, SOLUTION INTRA-ARTICULAR; INTRALESIONAL; INTRAMUSCULAR; INTRAVENOUS; SOFT TISSUE at 14:02

## 2024-06-28 RX ADMIN — KETOROLAC TROMETHAMINE 15 MG: 30 INJECTION, SOLUTION INTRAMUSCULAR at 14:37

## 2024-06-28 RX ADMIN — FENTANYL CITRATE 50 MCG: 50 INJECTION, SOLUTION INTRAMUSCULAR; INTRAVENOUS at 15:06

## 2024-06-28 RX ADMIN — MIDAZOLAM 2 MG: 1 INJECTION INTRAMUSCULAR; INTRAVENOUS at 13:38

## 2024-06-28 RX ADMIN — ACETAMINOPHEN 975 MG: 325 TABLET, FILM COATED ORAL at 16:25

## 2024-06-28 RX ADMIN — ROCURONIUM BROMIDE 50 MG: 50 INJECTION, SOLUTION INTRAVENOUS at 13:48

## 2024-06-28 RX ADMIN — LIDOCAINE HYDROCHLORIDE 1 ML: 10 INJECTION, SOLUTION EPIDURAL; INFILTRATION; INTRACAUDAL; PERINEURAL at 12:58

## 2024-06-28 RX ADMIN — PROPOFOL 180 MG: 10 INJECTION, EMULSION INTRAVENOUS at 13:47

## 2024-06-28 RX ADMIN — HYDROMORPHONE HYDROCHLORIDE 0.5 MG: 1 INJECTION, SOLUTION INTRAMUSCULAR; INTRAVENOUS; SUBCUTANEOUS at 14:19

## 2024-06-28 RX ADMIN — FENTANYL CITRATE 50 MCG: 50 INJECTION INTRAMUSCULAR; INTRAVENOUS at 13:47

## 2024-06-28 RX ADMIN — GLYCOPYRROLATE 0.1 MG: 0.2 INJECTION, SOLUTION INTRAMUSCULAR; INTRAVENOUS at 13:47

## 2024-06-28 RX ADMIN — PROPOFOL 100 MCG/KG/MIN: 10 INJECTION, EMULSION INTRAVENOUS at 13:47

## 2024-06-28 RX ADMIN — LIDOCAINE HYDROCHLORIDE 50 MG: 10 INJECTION, SOLUTION INFILTRATION; PERINEURAL at 13:47

## 2024-06-28 RX ADMIN — PHENYLEPHRINE HYDROCHLORIDE 100 MCG: 10 INJECTION INTRAVENOUS at 14:13

## 2024-06-28 RX ADMIN — PROPOFOL 20 MG: 10 INJECTION, EMULSION INTRAVENOUS at 13:48

## 2024-06-28 RX ADMIN — SUGAMMADEX 200 MG: 100 INJECTION, SOLUTION INTRAVENOUS at 14:36

## 2024-06-28 ASSESSMENT — LIFESTYLE VARIABLES: TOBACCO_USE: 1

## 2024-06-28 ASSESSMENT — ACTIVITIES OF DAILY LIVING (ADL)
ADLS_ACUITY_SCORE: 35
ADLS_ACUITY_SCORE: 35
ADLS_ACUITY_SCORE: 33
ADLS_ACUITY_SCORE: 35

## 2024-06-28 NOTE — ANESTHESIA PROCEDURE NOTES
Airway       Patient location during procedure: OR       Procedure Start/Stop Times: 6/28/2024 1:51 PM  Staff -        CRNA: Chetan Schmidt APRN CRNA       Other Anesthesia Staff: Lis Ross       Performed By: DONNA  Consent for Airway        Urgency: elective  Indications and Patient Condition       Indications for airway management: lisa-procedural       Induction type:intravenous       Mask difficulty assessment: 2 - vent by mask + OA or adjuvant +/- NMBA    Final Airway Details       Final airway type: endotracheal airway       Successful airway: ETT - single and Oral  Endotracheal Airway Details        ETT size (mm): 6.5       Cuffed: yes       Successful intubation technique: direct laryngoscopy       DL Blade Type: MAC 3       Grade View of Cords: 2       Adjucts: stylet       Position: Center       Measured from: gums/teeth       Secured at (cm): 22       Bite block used: None    Post intubation assessment        Placement verified by: capnometry, equal breath sounds and chest rise        Number of attempts at approach: 1       Number of other approaches attempted: 0       Secured with: tape       Ease of procedure: easy       Dentition: Unchanged    Medication(s) Administered   Medication Administration Time: 6/28/2024 1:51 PM

## 2024-06-28 NOTE — ANESTHESIA CARE TRANSFER NOTE
Patient: Shari Arshad    Procedure: Procedure(s):  CHOLECYSTECTOMY, ROBOT-ASSISTED, LAPAROSCOPIC, USING DA [place] XI       Diagnosis: Biliary dyskinesia [K82.8]  Diagnosis Additional Information: No value filed.    Anesthesia Type:   General     Note:    Oropharynx: spontaneously breathing and oral airway in place  Level of Consciousness: drowsy  Oxygen Supplementation: face mask  Level of Supplemental Oxygen (L/min / FiO2): 5    Dentition: dentition unchanged  Vital Signs Stable: post-procedure vital signs reviewed and stable  Report to RN Given: handoff report given  Patient transferred to: PACU    Handoff Report: Identifed the Patient, Identified the Reponsible Provider, Reviewed the pertinent medical history, Discussed the surgical course, Reviewed Intra-OP anesthesia mangement and issues during anesthesia, Set expectations for post-procedure period and Allowed opportunity for questions and acknowledgement of understanding      Vitals:  Vitals Value Taken Time   /78 06/28/24 1450   Temp     Pulse 51 06/28/24 1452   Resp 12 06/28/24 1452   SpO2 93 % 06/28/24 1452   Vitals shown include unfiled device data.    Electronically Signed By: QASIM Swartz CRNA  June 28, 2024  2:53 PM

## 2024-06-28 NOTE — OP NOTE
Procedure Date: 6/28/2024     PROCEDURE:  Robot-assisted laparoscopic cholecystectomy.     PREOPERATIVE DIAGNOSIS: Biliary dyskinesia     POSTOPERATIVE DIAGNOSIS: Biliary dyskinesia     SURGEON:  Ashvin Collier DO     ASSISTANT:  Beverley Fink PA-C; Assistance was utilized for port placement, instrument exchange, and incision closure.     ANESTHESIA:  General endotracheal anesthesia.     COMPLICATIONS:  None immediately apparent.     SPECIMENS:  Gallbladder and contents.     ESTIMATED BLOOD LOSS:  5 mL     FINDINGS: No gross abnormalities     INDICATIONS FOR PROCEDURE:  The patient is a 38-year-old female whom I met in the surgical clinic with biliary dyskinesia. After our discussion, I recommended robot-assisted laparoscopic cholecystectomy, possible open.  After our informed discussion we agreed to proceed with surgery.     DESCRIPTION OF PROCEDURE:  After the informed consent was obtained, the patient was brought to the preoperative holding area to the operating room and placed in the supine position.  Anesthesia was induced.  They were prepped and draped in the normal sterile fashion.  Timeout was performed.  After the correct patient and correct procedure were verified, we began by making a supraumbilical 8 mm incision.  Veress needle was inserted into the peritoneal cavity and the abdomen was insufflated to 15 mmHg.  Robotic trocar was placed. General survey of the abdomen revealed no gross abnormalities.  The patient was placed in the head up rotated left position.  We placed three additional robotic trocars in the left mid, right mid, and right lateral positions, all under direct visualization.  The robot was then docked.  We used two Cadiere graspers in the lateral most ports and a monopolar hook in the other working port.  The gallbladder had some mild omental adhesions, which were taken down with mostly blunt dissection with a small amount of electrocautery.  Gallbladder was then retracted from the left  lateral most port cephalad.  Then, using the two other working ports, we removed the peritoneum from Criss's pouch.  Using the hook, I was able to circumferentially dissect around the cystic duct.  This dissection was brought posteriorly until I encountered the cystic artery.  This was also circumferentially dissected.  Posterior to this, the cystic plate was visualized.  At this point, two structures were clearly going into the gallbladder, namely the cystic duct and cystic artery; therefore, the critical view of safety had been achieved.  The robotic clip applier was used to clip the cystic duct on the stay side x2 and the specimen side x1.  The cystic artery was clipped on the stay side x2 and the specimen x1.  Using the hook, the cystic duct and cystic artery were transected.  The gallbladder was then removed from the liver bed using electrocautery with the hook without issue.  Visual inspection of the operative field revealed no apparent complications and no ongoing bleeding.  The gallbladder was placed in an EndoCatch bag through the supraumbilical port.  The robot was then undocked.  Using the robotic camera through the remaining ports, we removed the gallbladder from the abdominal cavity.  The supraumbilical port site fascial defect was closed using 0 Vicryl suture and a PMI closure device.  The patient's abdomen was then desufflated, while the ports were removed under direct visualization.  The skin was instilled with 0.25% Marcaine with epinephrine for local anesthesia.  Skin was closed with inverted interrupted 4-0 Monocryl sutures and topical adhesive dressing was applied.  At the completion of the case all instruments, needles and sponges were accounted for after a correct count.  The patient was then awoken from anesthesia and brought to recovery room in stable condition.     Ashvin Collier DO, FACS

## 2024-06-28 NOTE — ANESTHESIA PREPROCEDURE EVALUATION
Anesthesia Pre-Procedure Evaluation    Patient: Shari Arshad   MRN: [id number] : 1985        Procedure : Procedure(s):  CHOLECYSTECTOMY, ROBOT-ASSISTED, LAPAROSCOPIC, USING DA [place] XI          Past Medical History:   Diagnosis Date     Gastro-oesophageal reflux disease      Heartburn during pregnancy in third trimester 2017     Hypertension      Lactose intolerance      Motion sickness      PIH (pregnancy induced hypertension) 2017     Rh(-), needs rhogam at 28 wks gestation 2017     S/P  section 2017      Past Surgical History:   Procedure Laterality Date      SECTION N/A 2017    Procedure:  SECTION;   Section;  Surgeon: Zechariah Mcdonald MD;  Location: [facility]     COLONOSCOPY N/A 2023    Procedure: COLONOSCOPY, WITH POLYPECTOMY AND BIOPSY;  Surgeon: Ashvin Collier DO;  Location: [facility]     HC EXCIS PRIMARY GANGLION WRIST  3/24/2004    Excision, volar wrist ganglion, left.     HC EXCIS RECURRENT GANGLION WRIST  2006    Recurrent volar wrist ganglion, left.     OPEN REDUCTION INTERNAL FIXATION WRIST      Cadavar bone      Allergies   Allergen Reactions     Fruit Extracts Hives     Fruits from trees only if she touches them     Milk [Milk (Cow)] GI Disturbance     No Known Drug Allergy       Social History     Tobacco Use     Smoking status: Former     Current packs/day: 0.50     Types: Cigarettes     Smokeless tobacco: Never   Substance Use Topics     Alcohol use: Yes     Alcohol/week: 0.0 standard drinks of alcohol     Comment: 2x a month not while pregnant      Wt Readings from Last 1 Encounters:   24 124.2 kg (273 lb 12.8 oz)        Anesthesia Evaluation   Pt has had prior anesthetic. Type: General, Regional and MAC.        ROS/MED HX  ENT/Pulmonary:     (+)     BERNADINE risk factors, snores loudly, hypertension, obese,        tobacco use, Past use,                       Neurologic: Comment: Paresthesia feet     "  Cardiovascular:     (+)  hypertension- -   -  - -                                      METS/Exercise Tolerance:     Hematologic:       Musculoskeletal:  - neg musculoskeletal ROS     GI/Hepatic:     (+) GERD,                   Renal/Genitourinary:  - neg Renal ROS     Endo:     (+)               Obesity,       Psychiatric/Substance Use:  - neg psychiatric ROS     Infectious Disease:  - neg infectious disease ROS     Malignancy:  - neg malignancy ROS     Other:  - neg other ROS          Physical Exam    Airway  airway exam normal      Mallampati: I   TM distance: > 3 FB   Neck ROM: full   Mouth opening: > 3 cm    Respiratory Devices and Support         Dental       (+) Minor Abnormalities - some fillings, tiny chips      Cardiovascular   cardiovascular exam normal       Rhythm and rate: regular and normal     Pulmonary   pulmonary exam normal        breath sounds clear to auscultation         OUTSIDE LABS:  CBC:   Lab Results   Component Value Date    WBC 8.7 04/04/2023    WBC 12.0 (H) 12/22/2022    HGB 13.4 06/21/2024    HGB 14.8 04/04/2023    HCT 44.9 04/04/2023    HCT 46.2 12/22/2022     04/04/2023     12/22/2022     BMP:   Lab Results   Component Value Date     03/14/2024     04/04/2023    POTASSIUM 4.0 06/21/2024    POTASSIUM 4.2 03/14/2024    CHLORIDE 101 03/14/2024    CHLORIDE 104 04/04/2023    CO2 25 03/14/2024    CO2 25 04/04/2023    BUN 12.4 03/14/2024    BUN 9.5 04/04/2023    CR 0.87 03/14/2024    CR 0.74 04/04/2023    GLC 90 03/14/2024    GLC 99 04/04/2023     COAGS: No results found for: \"PTT\", \"INR\", \"FIBR\"  POC: No results found for: \"BGM\", \"HCG\", \"HCGS\"  HEPATIC:   Lab Results   Component Value Date    ALBUMIN 4.5 03/14/2024    PROTTOTAL 7.7 03/14/2024    ALT 13 03/14/2024    AST 14 03/14/2024    ALKPHOS 90 03/14/2024    BILITOTAL 0.5 03/14/2024     OTHER:   Lab Results   Component Value Date    KONG 9.7 03/14/2024    TSH 1.36 03/14/2024       Anesthesia Plan    ASA Status:  " "3    NPO Status:  NPO Appropriate    Anesthesia Type: General.     - Airway: ETT   Induction: Intravenous, Propofol.   Maintenance: Balanced.        Consents    Anesthesia Plan(s) and associated risks, benefits, and realistic alternatives discussed. Questions answered and patient/representative(s) expressed understanding.     - Discussed: Risks, Benefits and Alternatives for BOTH SEDATION and the PROCEDURE were discussed     - Discussed with:  Patient      - Extended Intubation/Ventilatory Support Discussed: No.      - Patient is DNR/DNI Status: No     Use of blood products discussed: No .     Postoperative Care    Pain management: IV analgesics, Oral pain medications, Multi-modal analgesia, Peripheral nerve block (Continuous).   PONV prophylaxis: Ondansetron (or other 5HT-3), Dexamethasone or Solumedrol, Background Propofol Infusion     Comments:               QASIM Swartz CRNA    I have reviewed the pertinent notes and labs in the chart from the past 30 days and (re)examined the patient.  Any updates or changes from those notes are reflected in this note.              # Severe Obesity: Estimated body mass index is 44.19 kg/m  as calculated from the following:    Height as of 6/21/24: 1.676 m (5' 6\").    Weight as of 6/21/24: 124.2 kg (273 lb 12.8 oz).      "

## 2024-06-28 NOTE — DISCHARGE INSTRUCTIONS
[facility] Same-Day Surgery   Adult Discharge Orders & Instructions     For 24 hours after surgery    Get plenty of rest.  A responsible adult must stay with you for at least 24 hours after you leave the hospital.   Do not drive or use heavy equipment.  If you have weakness or tingling, don't drive or use heavy equipment until this feeling goes away.  Do not drink alcohol.  Avoid strenuous or risky activities.  Ask for help when climbing stairs.   You may feel lightheaded.  If so, sit for a few minutes before standing.  Have someone help you get up.   You may have a slight fever. Call the doctor if your fever is over 100 F (37.7 C) (taken under the tongue) or lasts longer than 24 hours.  You may have a dry mouth, a sore throat, muscle aches or trouble sleeping.  These should go away after 24 hours.  Do not make important or legal decisions.  We don t expect you to have any problems from the surgery or treatment you had today. Just in case, here s what to do if you have pain, upset stomach (nausea), bleeding or infection:  Pain:  Take medicines your doctor has prescribed or over-the-counter medicine they have suggested. Resting and using ice packs can help, too. For surgery on an arm or leg, raise it on a pillow to ease swelling. Call your doctor if these methods don t work.  Copyright Pablito Marks, Licensed under CC4.0 International  Upset stomach (nausea):  Take anti-nausea medicine approved by your doctor. Drink clear liquids like apple juice, ginger ale, broth or 7-Up. Be sure to drink enough fluids. Rest can help, too. Move to normal foods when you re ready.   Bleeding:  In the first 24 hours, you may see a little blood on your dressing, about the size of a quarter. You don t need to worry about this much blood, but if the blood spot keeps getting bigger:  Put pressure on the wound if you can, AND  Call your doctor.  Copyright miLibris, Licensed under CC4.0 International  Fever/Infection: Please call  your doctor if you have any of these signs:  Redness  Swelling  Wound feels warm  Pain gets worse  Bad-smelling fluid leaks from wound  Fever or chills  Call your doctor for any of the followin.  It has been over 8 to 10 hours since surgery and you are still not able to urinate (pass water).    2.  Headache for over 24 hours.    3.  Numbness, tingling or weakness in your legs the day after surgery (if you had spinal anesthesia).    For patient questions , Please call:  General Surgery: [phone]

## 2024-07-05 ASSESSMENT — SLEEP AND FATIGUE QUESTIONNAIRES
HOW LIKELY ARE YOU TO NOD OFF OR FALL ASLEEP WHILE LYING DOWN TO REST IN THE AFTERNOON WHEN CIRCUMSTANCES PERMIT: SLIGHT CHANCE OF DOZING
HOW LIKELY ARE YOU TO NOD OFF OR FALL ASLEEP WHILE SITTING AND TALKING TO SOMEONE: WOULD NEVER DOZE
HOW LIKELY ARE YOU TO NOD OFF OR FALL ASLEEP WHILE SITTING INACTIVE IN A PUBLIC PLACE: WOULD NEVER DOZE
HOW LIKELY ARE YOU TO NOD OFF OR FALL ASLEEP WHEN YOU ARE A PASSENGER IN A CAR FOR AN HOUR WITHOUT A BREAK: SLIGHT CHANCE OF DOZING
HOW LIKELY ARE YOU TO NOD OFF OR FALL ASLEEP WHILE WATCHING TV: SLIGHT CHANCE OF DOZING
HOW LIKELY ARE YOU TO NOD OFF OR FALL ASLEEP WHILE SITTING QUIETLY AFTER LUNCH WITHOUT ALCOHOL: WOULD NEVER DOZE
HOW LIKELY ARE YOU TO NOD OFF OR FALL ASLEEP IN A CAR, WHILE STOPPED FOR A FEW MINUTES IN TRAFFIC: SLIGHT CHANCE OF DOZING
HOW LIKELY ARE YOU TO NOD OFF OR FALL ASLEEP WHILE SITTING AND READING: WOULD NEVER DOZE

## 2024-07-08 ENCOUNTER — VIRTUAL VISIT (OUTPATIENT)
Dept: SURGERY | Facility: CLINIC | Age: 39
End: 2024-07-08
Payer: COMMERCIAL

## 2024-07-08 VITALS — HEIGHT: 66 IN | WEIGHT: 246 LBS | BODY MASS INDEX: 39.53 KG/M2

## 2024-07-08 DIAGNOSIS — K21.9 GASTROESOPHAGEAL REFLUX DISEASE, UNSPECIFIED WHETHER ESOPHAGITIS PRESENT: ICD-10-CM

## 2024-07-08 DIAGNOSIS — E66.01 MORBID OBESITY (H): Primary | ICD-10-CM

## 2024-07-08 PROCEDURE — 99204 OFFICE O/P NEW MOD 45 MIN: CPT | Mod: 95 | Performed by: FAMILY MEDICINE

## 2024-07-08 ASSESSMENT — PAIN SCALES - GENERAL: PAINLEVEL: MILD PAIN (2)

## 2024-07-08 NOTE — OR NURSING
S:pt s/p laparoscopic cholecystectomy  B:pt requiring IV pain meds in PACU  A:Pt given 2 doses of IV Fentanyl with little relief, pulled IV Dilaudid to give for continued pain, pt then stated that she did not like the way the IV pain meds made her feel, and requested no further IV pain meds.   R:Writer attempted to waste 0.5mg IV Dilaudid, with RN Jamie Pipkin, mistakenly wasted the 0.1 mg instead (order was for 0.4 mg Dilaudid). Pharmacy aware and advised note.

## 2024-07-08 NOTE — LETTER
"2024      Shari Arshad  [address] [place] [id number]      Dear Colleague,    Thank you for referring your patient, Shari Arshad, to the [facility] SUR[facility] AND BARIATRICS CARE [place]. Please see a copy of my visit note below.        New Medical Weight Management Consult    PATIENT:  Shari Arshad  MRN:         [id number]  :         1985  RIP:         2024    Dear Dr. Mcdonald,    I had the pleasure of seeing your patient, Shari Arshad. Full intake/assessment was done to determine barriers to weight loss success and develop a treatment plan. Shari Arshad is a 38 year old female interested in treatment of medical problems associated with excess weight. She has a height of 5' 5.984\", a weight of 246 lbs 0 oz, and the calculated Body mass index is 39.72 kg/m .    ASSESSMENT/PLAN:  1. Morbid obesity (H)  We discussed healthy habits to assist with weight loss. She will work on planning meals ahead of time using the plate method for portion control and macronutrient proportions. She will try to switch to fruits or vegetables for snacks and try to keep added sugars to <10 grams per meal. She will try keeping a food journal with i[employer]. She plans to get back to the gym and weight lift. Sleep consult ordered.  We discussed medication that may assist with weight loss. She would be a candidate for a GLP1 agonist or phentermine or  topamax. She is not interested at this time.   - Adult Comprehensive Weight Management  Referral    2. Benign essential hypertension  This may improve with healthy habits and weight loss.     3. Gastroesophageal reflux disease, unspecified whether esophagitis present  This may improve with healthy habits and weight loss.      She has the following co-morbidities:        2024    10:33 AM   --   I have the following health issues associated with obesity High Blood Pressure    GERD (Reflux)   I have the following symptoms " "associated with obesity Knee Pain    Back Pain    Groin Rash    Irregular Menstral Cycle             7/5/2024    10:33 AM   Referring Provider   Please name the provider who referred you to Medical Weight Management  If you do not know, please answer \"I Don't Know\" Zechariah sena           7/5/2024    10:33 AM   Weight History   How concerned are you about your weight? Somewhat Concerned   I became overweight As a Teenager   The following factors have contributed to my weight gain Change in Schedule    Eating Wrong Types of Food    Eating Too Much    Lack of Exercise    Stress   I have tried the following methods to lose weight Watching Portions or Calories    Exercise   My lowest weight since age 17 was 189   My highest weight since age 17 was 250   The most weight I have ever lost was (lbs) 75   I have the following family history of obesity/being overweight My mother is overweight    My father is overweight    One or more of my siblings are overweight    Many of my relatives are overweight   How has your weight changed over the last year? Gained   How many pounds? 25     She is not sure why she gained weight in the past year. She was struggling with abdominal pain due to some gallbladder issues during that time. She recently had her gallbladder removed.         7/5/2024    10:33 AM   Diet Recall Review with Patient   If you do eat breakfast, what types of food do you eat? Egg bagel with two eggs and cheese, or yogert with granola or cereal with almond milk   If you do eat lunch, what types of food do you typically eat? Dinner leftovers from night before. Chips or crackers and usually snacks- packs and brings to work   If you do eat supper, what types of food do you typically eat? Meat, vegetable and grain   If you do snack, what types of food do you typically eat? Granola bars, fruit snacks, nutriagrain bars, trail mix- between lunch and dinner   How many glasses of juice do you drink in a typical day? 0   How " many of glasses of milk do you drink in a typical day? 0   How many 8oz glasses of sugar containing drinks such as Randolph-Aid/sweet tea do you drink in a day? Lemonaid and tea, once every 2 weeks   How many cans/bottles of sugar pop/soda/tea/sports drinks do you drink in a day? 0   How many cans/bottles of diet pop/soda/tea or sports drink do you drink in a day? 0   How often do you have a drink of alcohol? Monthly or Less   If you do drink, how many drinks might you have in a day? 1 or 2   Water: 88 oz per day  Coffee (black) 12-24 oz per day        7/5/2024    10:33 AM   Eating Habits   Generally, my meals include foods like these bread, pasta, rice, potatoes, corn, crackers, sweet dessert, pop, or juice Once a Week   Generally, my meals include foods like these fried meats, brats, burgers, french fries, pizza, cheese, chips, or ice cream Once a Week   Eat fast food (like McDonalds, Burger Jose, Taco Bell) Less Than Weekly   Eat at a buffet or sit-down restaurant Less Than Weekly   Eat most of my meals in front of the TV or computer A Few Times a Week   Often skip meals, eat at random times, have no regular eating times A Few Times a Week   Rarely sit down for a meal but snack or graze throughout Less Than Weekly   Eat extra snacks between meals A Few Times a Week   Eat most of my food at the end of the day A Few Times a Week   Eat in the middle of the night or wake up at night to eat Never   Eat extra snacks to prevent or correct low blood sugar Never   Eat to prevent acid reflux or stomach pain Once a Week   Worry about not having enough food to eat Less Than Weekly   I eat when I am depressed Once a Week   I eat when I am stressed Less Than Weekly   I eat when I am bored A Few Times a Week   I eat when I am anxious A Few Times a Week   I eat when I am happy or as a reward Once a Week   I feel hungry all the time even if I just have eaten Less Than Weekly   Feeling full is important to me A Few Times a Week   I  finish all the food on my plate even if I am already full A Few Times a Week   I can't resist eating delicious food or walk past the good food/smell Less Than Weekly   I eat/snack without noticing that I am eating A Few Times a Week   I eat when I am preparing the meal Less Than Weekly   I eat more than usual when I see others eating Never   I have trouble not eating sweets, ice cream, cookies, or chips if they are around the house Less Than Weekly   I think about food all day Less Than Weekly   What foods, if any, do you crave? Cheese   Please list any other foods you crave? Sweet/salty around my period. Meat.     Meals not prepared at home per week: 1-2, 2 cheeseburger meal or chicken sandwich and fries          7/5/2024    10:33 AM   Amount of Food   I feel out of control when eating Monthly   I eat a large amount of food, like a loaf of bread, a box of cookies, a pint/quart of ice cream, all at once Never   I eat a large amount of food even when I am not hungry Weekly   I eat rapidly Everyday   I eat alone because I feel embarrassed and do not want others to see how much I have eaten Never   I eat until I am uncomfortably full Weekly   I feel bad, disgusted, or guilty after I overeat Weekly           7/5/2024    10:33 AM   Activity/Exercise History   How much of a typical 12 hour day do you spend sitting? Half the Day   How much of a typical 12 hour day do you spend lying down? Less Than Half the Day   How much of a typical day do you spend walking/standing? Half the Day   How many hours (not including work) do you spend on the TV/Video Games/Computer/Tablet/Phone? 2-3 Hours   How many times a week are you active for the purpose of exercise? Never   What keeps you from being more active? Lack of Time    Other- recent surgery   How many total minutes do you spend doing some activity for the purpose of exercising when you exercise? Less Than 15 Minutes     Patient lost 80# 6 years ago using [email or web address] pal. She was  using her eliptical and going to the gym- walking, yoga and lifting weights.     PAST MEDICAL HISTORY:  Past Medical History:   Diagnosis Date     Gastro-oesophageal reflux disease      Heartburn during pregnancy in third trimester 2017     Hypertension      Lactose intolerance      Motion sickness      PIH (pregnancy induced hypertension) 2017     Rh(-), needs rhogam at 28 wks gestation 2017     S/P  section 2017    10:33 AM   Work/Social History Reviewed With Patient   My employment status is Full-Time   My job is [employer] in [facility] [employer] [facility]   How much of your job is spent on the computer or phone? 75%   How many hours do you spend commuting to work daily? 1   What is your marital status? /In a Relationship   If in a relationship, is your significant other overweight? No   If you have children, are they overweight? No   Who do you live with? My  and daugter   Who does the food shopping? I do           2024    10:33 AM   Mental Health History Reviewed With Patient   Have you ever been physically or sexually abused? No   How often in the past 2 weeks have you felt little interest or pleasure in doing things? Not at all   Over the past 2 weeks how often have you felt down, depressed, or hopeless? Not at all           2024    10:33 AM   Sleep History Reviewed With Patient   How many hours do you sleep at night? 8       MEDICATIONS:   Current Outpatient Medications   Medication Sig Dispense Refill     albuterol (PROAIR HFA/PROVENTIL HFA/VENTOLIN HFA) 108 (90 Base) MCG/ACT inhaler Inhale 1-2 puffs into the lungs every 6 hours 18 g 1     FLUoxetine (PROZAC) 10 MG capsule Take 1 capsule (10 mg) by mouth daily 90 capsule 1     hydrochlorothiazide (HYDRODIURIL) 25 MG tablet Take 1 tablet (25 mg) by mouth daily 90 tablet 3     lisinopril (ZESTRIL) 20 MG tablet TAKE ONE TABLET BY MOUTH ONCE DAILY 90 tablet 3     metoprolol succinate ER (TOPROL  XL) 100 MG 24 hr tablet TAKE ONE TABLET BY MOUTH ONCE DAILY 90 tablet 3     montelukast (SINGULAIR) 10 MG tablet TAKE ONE TABLET BY MOUTH AT BEDTIME 90 tablet 0     omeprazole (PRILOSEC) 20 MG DR capsule TAKE 1 CAPSULE BY MOUTH ONCE DAILY 90 capsule 1     oxyCODONE-acetaminophen (PERCOCET) 5-325 MG tablet Take 1-2 tablets by mouth every 4 hours as needed for pain 12 tablet 0       ALLERGIES:   Allergies   Allergen Reactions     Fruit Extracts Hives     Fruits from trees only if she touches them     Milk [Milk (Cow)] GI Disturbance     No Known Drug Allergy      ROS  General  Fatigue: sometimes  HEENT  Hx of glaucoma: no  Vision changes: no  Cardiovascular  Hx of heart disease: no  Chest Pain with Exertion: no  Palpitations: no  Pulmonary  Shortness of breath at rest: no  Shortness of breath with exertion: yes  Stop-bang score: 4  Uxbridge Score: 5  Gastrointestinal  Heartburn: yes  Pancreatitis: no  Psychiatric  Moods Stable: yes  Endocrine  Polydipsia: no  No personal or family history of medullary thyroid cancer: no  No personal or family history of MEN2   Neurologic  Hx of seizures: no  Migraine headaches: history of    Birth control: 's vasectomy  Kidney stones: yes     PHYSICAL EXAM:  Wt Readings from Last 4 Encounters:   07/08/24 111.6 kg (246 lb)   06/21/24 124.2 kg (273 lb 12.8 oz)   05/31/24 121.6 kg (268 lb)   03/14/24 121.7 kg (268 lb 6.4 oz)      BP Readings from Last 3 Encounters:   06/28/24 133/69   06/21/24 121/76   05/31/24 128/78      GENERAL: alert and no distress  EYES: Eyes grossly normal to inspection.  No discharge or erythema, or obvious scleral/conjunctival abnormalities.  RESP: No audible wheeze, cough, or visible cyanosis.    SKIN: Visible skin clear. No significant rash, abnormal pigmentation or lesions.  NEURO: Cranial nerves grossly intact.  Mentation and speech appropriate for age.  PSYCH: Appropriate affect, tone, and pace of words     FOLLOW-UP:   12 weeks.    Total time spent  on the date of this encounter doing: chart review, review of test results, patient visit, physical exam, education, counseling, developing plan of care and documenting = 46 minutes.     Sincerely,    NATTY Brooks MD          Virtual Visit Details    Type of service:  Video Visit     Originating Location (pt. Location): Home    Distant Location (provider location):  Off-site  Platform used for Video Visit: PlaySpan    Video start time: 9:41 am  Video end time: 10:13 am      Again, thank you for allowing me to participate in the care of your patient.        Sincerely,        NATTY Brooks MD

## 2024-07-08 NOTE — NURSING NOTE
Current patient location: [address] [place] [address]    Is the patient currently in the state of [place]? YES    Visit mode:VIDEO    If the visit is dropped, the patient can be reconnected by: VIDEO VISIT: Text to cell phone:   Telephone Information:   Mobile [phone]       Will anyone else be joining the visit? NO  (If patient encounters technical issues they should call [phone]    How would you like to obtain your AVS? MyChart    Are changes needed to the allergy or medication list? No    Are refills needed on medications prescribed by this physician? NO    Reason for visit: Consult    Megan ALEJO

## 2024-07-08 NOTE — PROGRESS NOTES
Virtual Visit Details    Type of service:  Video Visit     Originating Location (pt. Location): Home    Distant Location (provider location):  Off-site  Platform used for Video Visit: [employer]    Video start time: 9:41 am  Video end time: 10:13 am

## 2024-07-08 NOTE — PATIENT INSTRUCTIONS
Eat Better ? Move More ? Live Well    Eat 3 nutrient-rich meals each day    Don t skip meals--it will cause you to overeat later in the day!    Eating fiber (vegetables/fruits/whole grains) and protein with meals helps you stay full longer    Choose foods with less than 10 grams of sugar and 5 grams of fat per serving to prevent excess calories and weight re-gain  Eat around the same times each day to develop a routine eating schedule   Avoid snacking unless physically hungry.   Planned snacks: 1-2 times per day and no more than 150 calories    Eat protein first   Protein helps with healing, maintaining adequate muscle mass, reducing hunger and optimizing nutritional status   Aim for 60-80 grams of protein per day   Fill up on Fiber   Fiber comes from plants--fruits, veggies, whole grains, nuts/seeds and beans   Fiber is low in calories, high in phytonutrients and helps you stay full longer   Aim for 25-35 grams per day by eating fiber with meals and snacks  Eat S-[id number]   Take 20-30 minutes to eat each meal by taking small bites, chewing foods to applesauce consistency or 20-30 times before you swallow   Eating foods too fast can delay satiety/fullness signals and increase overeating   Slow down your eating by using toddler utensils, putting your fork/spoon down between bites and not watching TV or emailing during meals!   Keep a Journal         Writing down what you eat, how you feel and when you are active helps you identify new changes to work on from week to week         Look for ways to cut 100 calories from your current diet 2-3 times per day  Drink 64 ounces of 0-Calorie drinks between meals   Water   Zero calorie Propel  or Vitamin Water     SoBe [place]  Zero Calories   Crystal Light , Sugar-Free Randolph-Aid , and other sugar-free lemonade or flavored carrera   Keep Caffeine to less than 300mg per day ie: 3-6oz cups coffee     Work up to 45-60 minutes of physical activity most days of the week   Helps  with losing weight and prevent regaining those extra pounds!    Do a combo of cardio (walking/water exercises) and strength training (lifting weights/Vinyasa yoga)    Avoid Mindless Eating   Be present when you eat--take note of the smell, taste and quality of your food   Make a list of alternative activities you could do to prevent eating out of boredom/stress  Go for a walk, call a friend, chew gum, paint your nails, re-organize the garage, etc    LEAN PROTEIN SOURCES      Protein Source Portion Calories Grams of Protein                           Nonfat, plain Greek yogurt    (10 grams sugar or less) 3/4 cup (6 oz)  12-17   Light Yogurt (10 grams sugar or less) 3/4 cup (6 oz)  6-8   Protein Shake 1 shake 110-180 15-30   Skim/1% Milk or lactose-free milk 1 cup ( 8 oz)  8   Plain or light, flavored soymilk 1 cup  7-8   Plain or light, hemp milk 1 cup 110 6   Fat Free or 1% Cottage Cheese 1/2 cup 90 15   Part skim ricotta cheese 1/2 cup 100 14   Part skim or reduced fat cheese slices 1 ounce 65-80 8     Mozzarella String Cheese 1 80 8   Canned tuna, chicken, crab or salmon  (canned in water)  1/2 cup 100 15-20   White fish (broiled, grilled, baked) 3 ounces 100 21   [place]/Tuna (broiled, grilled, baked) 3 ounces 150-180 21   Shrimp, Scallops, Lobster, Crab 3 ounces 100 21   Pork loin, Pork Tenderloin 3 ounces 150 21   Boneless, skinless chicken /turkey breast                          (broiled, grilled, baked) 3 ounces 120 21   [place], [place], [place], and Venison 3 ounces 120 21   Lean cuts of red meat and pork (sirloin,   round, tenderloin, flank, ground 93%-96%) 3 ounces 170 21   Lean or Extra Lean Ground Turkey 1/2 cup 150 20   90-95% Lean Mitchell Burger 1 ap 140-180 21   Low-fat casserole with lean meat 3/4 cup 200 17   Luncheon Meats                                                        (turkey, lean ham, roast beef, chicken) 3 ounces 100 21   Egg (boiled, poached, scrambled) 1 Egg 60 7    Egg Substitute 1/2 cup 70 10   Nuts (limit to 1 serving per day)  3 Tbsp. 150 7   Nut [place] (peanut, almond)  Limit to 1 serving or less daily 1 Tbsp. 90 4   Soy Burger (varies) 1  15   Garbanzo, Black, Perkins Beans 1/2 cup 110 7   Refried Beans 1/2 cup 100 7   Kidney and Lima beans 1/2 cup 110 7   Tempeh 3 oz 175 18   Vegan crumbles 1/2 cup 100 14   Tofu 1/2 cup 110 14   Chili (beans and extra lean beef or turkey) 1 cup 200 23   Lentil Stew/Soup 1 cup 150 12   Black Bean Soup 1 cup 175 12

## 2024-07-08 NOTE — PROGRESS NOTES
"    New Medical Weight Management Consult    PATIENT:  Shari Arshad  MRN:         [id number]  :         1985  RIP:         2024    Dear Dr. Mcdonald,    I had the pleasure of seeing your patient, Shari Arshad. Full intake/assessment was done to determine barriers to weight loss success and develop a treatment plan. Shari Arshad is a 38 year old female interested in treatment of medical problems associated with excess weight. She has a height of 5' 5.984\", a weight of 246 lbs 0 oz, and the calculated Body mass index is 39.72 kg/m .    ASSESSMENT/PLAN:  1. Morbid obesity (H)  We discussed healthy habits to assist with weight loss. She will work on planning meals ahead of time using the plate method for portion control and macronutrient proportions. She will try to switch to fruits or vegetables for snacks and try to keep added sugars to <10 grams per meal. She will try keeping a food journal with [employer]. She plans to get back to the gym and weight lift. Sleep consult ordered.  We discussed medication that may assist with weight loss. She would be a candidate for a GLP1 agonist or phentermine or  topamax. She is not interested at this time.   - Adult Comprehensive Weight Management  Referral    2. Benign essential hypertension  This may improve with healthy habits and weight loss.     3. Gastroesophageal reflux disease, unspecified whether esophagitis present  This may improve with healthy habits and weight loss.      She has the following co-morbidities:        2024    10:33 AM   --   I have the following health issues associated with obesity High Blood Pressure    GERD (Reflux)   I have the following symptoms associated with obesity Knee Pain    Back Pain    Groin Rash    Irregular Menstral Cycle             2024    10:33 AM   Referring Provider   Please name the provider who referred you to Medical Weight Management  If you do not know, please answer \"I Don't Know\" " Zechariah sena           7/5/2024    10:33 AM   Weight History   How concerned are you about your weight? Somewhat Concerned   I became overweight As a Teenager   The following factors have contributed to my weight gain Change in Schedule    Eating Wrong Types of Food    Eating Too Much    Lack of Exercise    Stress   I have tried the following methods to lose weight Watching Portions or Calories    Exercise   My lowest weight since age 17 was 189   My highest weight since age 17 was 250   The most weight I have ever lost was (lbs) 75   I have the following family history of obesity/being overweight My mother is overweight    My father is overweight    One or more of my siblings are overweight    Many of my relatives are overweight   How has your weight changed over the last year? Gained   How many pounds? 25     She is not sure why she gained weight in the past year. She was struggling with abdominal pain due to some gallbladder issues during that time. She recently had her gallbladder removed.         7/5/2024    10:33 AM   Diet Recall Review with Patient   If you do eat breakfast, what types of food do you eat? Egg bagel with two eggs and cheese, or yogert with granola or cereal with almond milk   If you do eat lunch, what types of food do you typically eat? Dinner leftovers from night before. Chips or crackers and usually snacks- packs and brings to work   If you do eat supper, what types of food do you typically eat? Meat, vegetable and grain   If you do snack, what types of food do you typically eat? Granola bars, fruit snacks, nutriagrain bars, trail mix- between lunch and dinner   How many glasses of juice do you drink in a typical day? 0   How many of glasses of milk do you drink in a typical day? 0   How many 8oz glasses of sugar containing drinks such as Randolph-Aid/sweet tea do you drink in a day? Lemonaid and tea, once every 2 weeks   How many cans/bottles of sugar pop/soda/tea/sports drinks do you drink  in a day? 0   How many cans/bottles of diet pop/soda/tea or sports drink do you drink in a day? 0   How often do you have a drink of alcohol? Monthly or Less   If you do drink, how many drinks might you have in a day? 1 or 2   Water: 88 oz per day  Coffee (black) 12-24 oz per day        7/5/2024    10:33 AM   Eating Habits   Generally, my meals include foods like these bread, pasta, rice, potatoes, corn, crackers, sweet dessert, pop, or juice Once a Week   Generally, my meals include foods like these fried meats, brats, burgers, french fries, pizza, cheese, chips, or ice cream Once a Week   Eat fast food (like McDonalds, [employer], Taco Bell) Less Than Weekly   Eat at a buffet or sit-down restaurant Less Than Weekly   Eat most of my meals in front of the TV or computer A Few Times a Week   Often skip meals, eat at random times, have no regular eating times A Few Times a Week   Rarely sit down for a meal but snack or graze throughout Less Than Weekly   Eat extra snacks between meals A Few Times a Week   Eat most of my food at the end of the day A Few Times a Week   Eat in the middle of the night or wake up at night to eat Never   Eat extra snacks to prevent or correct low blood sugar Never   Eat to prevent acid reflux or stomach pain Once a Week   Worry about not having enough food to eat Less Than Weekly   I eat when I am depressed Once a Week   I eat when I am stressed Less Than Weekly   I eat when I am bored A Few Times a Week   I eat when I am anxious A Few Times a Week   I eat when I am happy or as a reward Once a Week   I feel hungry all the time even if I just have eaten Less Than Weekly   Feeling full is important to me A Few Times a Week   I finish all the food on my plate even if I am already full A Few Times a Week   I can't resist eating delicious food or walk past the good food/smell Less Than Weekly   I eat/snack without noticing that I am eating A Few Times a Week   I eat when I am preparing the meal  Less Than Weekly   I eat more than usual when I see others eating Never   I have trouble not eating sweets, ice cream, cookies, or chips if they are around the house Less Than Weekly   I think about food all day Less Than Weekly   What foods, if any, do you crave? Cheese   Please list any other foods you crave? Sweet/salty around my period. Meat.     Meals not prepared at home per week: 1-2, 2 cheeseburger meal or chicken sandwich and fries          7/5/2024    10:33 AM   Amount of Food   I feel out of control when eating Monthly   I eat a large amount of food, like a loaf of bread, a box of cookies, a pint/quart of ice cream, all at once Never   I eat a large amount of food even when I am not hungry Weekly   I eat rapidly Everyday   I eat alone because I feel embarrassed and do not want others to see how much I have eaten Never   I eat until I am uncomfortably full Weekly   I feel bad, disgusted, or guilty after I overeat Weekly           7/5/2024    10:33 AM   Activity/Exercise History   How much of a typical 12 hour day do you spend sitting? Half the Day   How much of a typical 12 hour day do you spend lying down? Less Than Half the Day   How much of a typical day do you spend walking/standing? Half the Day   How many hours (not including work) do you spend on the TV/Video Games/Computer/Tablet/Phone? 2-3 Hours   How many times a week are you active for the purpose of exercise? Never   What keeps you from being more active? Lack of Time    Other- recent surgery   How many total minutes do you spend doing some activity for the purpose of exercising when you exercise? Less Than 15 Minutes     Patient lost 80# 6 years ago using Myfitness pal. She was using her eliptical and going to the gym- walking, yoga and lifting weights.     PAST MEDICAL HISTORY:  Past Medical History:   Diagnosis Date    Gastro-oesophageal reflux disease     Heartburn during pregnancy in third trimester 06/20/2017    Hypertension     Lactose  intolerance     Motion sickness     PIH (pregnancy induced hypertension) 2017    Rh(-), needs rhogam at 28 wks gestation 2017    S/P  section 2017    10:33 AM   Work/Social History Reviewed With Patient   My employment status is Full-Time   My job is [employer] in [employer]   How much of your job is spent on the computer or phone? 75%   How many hours do you spend commuting to work daily? 1   What is your marital status? /In a Relationship   If in a relationship, is your significant other overweight? No   If you have children, are they overweight? No   Who do you live with? My  and daugter   Who does the food shopping? I do           2024    10:33 AM   Mental Health History Reviewed With Patient   Have you ever been physically or sexually abused? No   How often in the past 2 weeks have you felt little interest or pleasure in doing things? Not at all   Over the past 2 weeks how often have you felt down, depressed, or hopeless? Not at all           2024    10:33 AM   Sleep History Reviewed With Patient   How many hours do you sleep at night? 8       MEDICATIONS:   Current Outpatient Medications   Medication Sig Dispense Refill    albuterol (PROAIR HFA/PROVENTIL HFA/VENTOLIN HFA) 108 (90 Base) MCG/ACT inhaler Inhale 1-2 puffs into the lungs every 6 hours 18 g 1    FLUoxetine (PROZAC) 10 MG capsule Take 1 capsule (10 mg) by mouth daily 90 capsule 1    hydrochlorothiazide (HYDRODIURIL) 25 MG tablet Take 1 tablet (25 mg) by mouth daily 90 tablet 3    lisinopril (ZESTRIL) 20 MG tablet TAKE ONE TABLET BY MOUTH ONCE DAILY 90 tablet 3    metoprolol succinate ER (TOPROL XL) 100 MG 24 hr tablet TAKE ONE TABLET BY MOUTH ONCE DAILY 90 tablet 3    montelukast (SINGULAIR) 10 MG tablet TAKE ONE TABLET BY MOUTH AT BEDTIME 90 tablet 0    omeprazole (PRILOSEC) 20 MG DR capsule TAKE 1 CAPSULE BY MOUTH ONCE DAILY 90 capsule 1    oxyCODONE-acetaminophen  (PERCOCET) 5-325 MG tablet Take 1-2 tablets by mouth every 4 hours as needed for pain 12 tablet 0       ALLERGIES:   Allergies   Allergen Reactions    Fruit Extracts Hives     Fruits from trees only if she touches them    Milk [Milk (Cow)] GI Disturbance    No Known Drug Allergy      ROS  General  Fatigue: sometimes  HEENT  Hx of glaucoma: no  Vision changes: no  Cardiovascular  Hx of heart disease: no  Chest Pain with Exertion: no  Palpitations: no  Pulmonary  Shortness of breath at rest: no  Shortness of breath with exertion: yes  Stop-bang score: 4  Anson Score: 5  Gastrointestinal  Heartburn: yes  Pancreatitis: no  Psychiatric  Moods Stable: yes  Endocrine  Polydipsia: no  No personal or family history of medullary thyroid cancer: no  No personal or family history of MEN2   Neurologic  Hx of seizures: no  Migraine headaches: history of    Birth control: 's vasectomy  Kidney stones: yes     PHYSICAL EXAM:  Wt Readings from Last 4 Encounters:   07/08/24 111.6 kg (246 lb)   06/21/24 124.2 kg (273 lb 12.8 oz)   05/31/24 121.6 kg (268 lb)   03/14/24 121.7 kg (268 lb 6.4 oz)      BP Readings from Last 3 Encounters:   06/28/24 133/69   06/21/24 121/76   05/31/24 128/78      GENERAL: alert and no distress  EYES: Eyes grossly normal to inspection.  No discharge or erythema, or obvious scleral/conjunctival abnormalities.  RESP: No audible wheeze, cough, or visible cyanosis.    SKIN: Visible skin clear. No significant rash, abnormal pigmentation or lesions.  NEURO: Cranial nerves grossly intact.  Mentation and speech appropriate for age.  PSYCH: Appropriate affect, tone, and pace of words     FOLLOW-UP:   12 weeks.    Total time spent on the date of this encounter doing: chart review, review of test results, patient visit, physical exam, education, counseling, developing plan of care and documenting = 46 minutes.     Sincerely,    NATTY Brooks MD

## 2024-07-09 ENCOUNTER — VIRTUAL VISIT (OUTPATIENT)
Dept: SURGERY | Facility: CLINIC | Age: 39
End: 2024-07-09
Payer: COMMERCIAL

## 2024-07-09 DIAGNOSIS — E66.9 OBESITY (BMI 30-39.9): Primary | ICD-10-CM

## 2024-07-09 PROCEDURE — 97802 MEDICAL NUTRITION INDIV IN: CPT | Mod: 95

## 2024-07-09 NOTE — LETTER
7/9/2024      Shari Arshad  [address] [address]      Dear Colleague,    Thank you for referring your patient, Shari Arshad, to the [facility] [place]. Please see a copy of my visit note below.    Shari Arshad is a 38 year old who is being evaluated via a billable video visit.        Medical Weight Loss Initial Diet Evaluation  Assessment:  This patient was referred by Dr. Brooks for MNT as treatment for Obesity     Shari is presenting today for a new weight management nutrition consultation. Pt has had an initial appointment with Dr. Brooks.    Weight loss medication:  none .       Anthropometrics:    Pt's weight is 0 lbs 0 oz  Initial weight: 246 lbs  Weight change: n/a  BMI: There is no height or weight on file to calculate BMI.   Ideal body weight: 59.3 kg (130 lb 10.4 oz)  Adjusted ideal body weight: 80.2 kg (176 lb 12.7 oz)  Estimated RMR ([place]-St Jeor equation):  1810 kcals x 1.2 (sedentary) = 2170 kcals (for weight maintenance)    Recommended Protein Intake: 60-80 grams of protein/day    Medical History:  Patient Active Problem List   Diagnosis     Esophageal reflux     Lactose intolerances     Heartburn     Benign essential hypertension     Benign essential hypertension, postpartum     Morbid obesity (H)     PMS (premenstrual syndrome)     Paresthesia of foot, bilateral     LINWOOD     Lower abdominal pain     Tobacco use disorder     Abdominal pain, generalized     Abdominal pain, right upper quadrant      Nutrition History:   Food allergies/intolerances/cultural or religous food customs: Yes Pineapple, walnuts, raw apples     Weight loss history: Patient reports in the last few years she has gained 25 lbs. Has had recent gull bladder removal.     Vitamins/Mineral Supplementation: None    Dietary Recall:  Breakfast: x2 egg bagel (plain) with two eggs and cheese, or yogurt (Greek, regular) with granola or cereal with almond milk    Lunch: Dinner leftovers from night before. Chips or crackers and usually snacks  Dinner: Meat (ground turkey, chicken, reg meats), vegetable and grain   Typical Snacks: Granola bars, fruit snacks, nutriagrain bars, trail mix- between lunch and dinner   Overnight eating: No  Eating out: 0-1x per week    Beverages:     Water  oz  Coffee - black   Occasional lemonade/sweet tea    Exercise: no routine at this time     Nutrition Diagnosis (PES statement):     Obesity related to excessive energy intake as evidence by BMI of 39.72     Nutrition Intervention  Food and/or Nutrient Delivery   Placed emphasis on importance of developing a healthy meal routine, aiming for 3 meals a day and no snacks.  Discussed using a protein supplement as a meal replacement.  Nutrition Education   Discussed with patient how to build a meal: the importance of including a lean/low fat protein at each meal, include a source of vegetables at a minimum of lunch and dinner and limiting carbohydrate intake to 25% per meal.  Educated on sources of lean protein, portion sizes, the amount of grams found in each source. Recommend patient to aim for 20-30g protein at each meal.  Educated on how to read a food label: keeping total fat <10g and sugar <10g per serving.    Nutrition Counseling   Encouraged importance of developing routine exercise for health benefits and weight loss.      Goals established by patient:   Aim for 4386-7085 kcal per day   Aim for 25-30g protein per day (70-90g per day)  Follow the 10/10 rule   Increase activity, aiming for 3-4k steps per day       Handouts provided:  Intor to MWM  10/10 rule  Quick meals     Assessment/Plan:    Pt will follow up in 4 month(s) with bariatrician and 4 month(s) with dietitian.       Video-Visit Details    Type of service:  Video Visit    Video Start Time (time video started): 9:03 AM    Video End Time (time video stopped): 9:30 AM    Originating Location (pt. Location): Home      Distant  Location (provider location):  Off-site    Mode of Communication:  Video Conference via [facility]    Physician has received verbal consent for a Video Visit from the patient? Yes      Caryl Meyers RD         Again, thank you for allowing me to participate in the care of your patient.        Sincerely,        Caryl Meyers RD

## 2024-07-09 NOTE — PROGRESS NOTES
Shari Arshad is a 38 year old who is being evaluated via a billable video visit.        Medical Weight Loss Initial Diet Evaluation  Assessment:  This patient was referred by Dr. Brooks for MNT as treatment for Obesity     Shari is presenting today for a new weight management nutrition consultation. Pt has had an initial appointment with Dr. Brooks.    Weight loss medication:  none .       Anthropometrics:    Pt's weight is 0 lbs 0 oz  Initial weight: 246 lbs  Weight change: n/a  BMI: There is no height or weight on file to calculate BMI.   Ideal body weight: 59.3 kg (130 lb 10.4 oz)  Adjusted ideal body weight: 80.2 kg (176 lb 12.7 oz)  Estimated RMR ([place]-St Jeor equation):  1810 kcals x 1.2 (sedentary) = 2170 kcals (for weight maintenance)    Recommended Protein Intake: 60-80 grams of protein/day    Medical History:  Patient Active Problem List   Diagnosis    Esophageal reflux    Lactose intolerances    Heartburn    Benign essential hypertension    Benign essential hypertension, postpartum    Morbid obesity (H)    PMS (premenstrual syndrome)    Paresthesia of foot, bilateral    LINWOOD    Lower abdominal pain    Tobacco use disorder    Abdominal pain, generalized    Abdominal pain, right upper quadrant      Nutrition History:   Food allergies/intolerances/cultural or religous food customs: Yes Pineapple, walnuts, raw apples     Weight loss history: Patient reports in the last few years she has gained 25 lbs. Has had recent gull bladder removal.     Vitamins/Mineral Supplementation: None    Dietary Recall:  Breakfast: x2 egg bagel (plain) with two eggs and cheese, or yogurt (Greek, regular) with granola or cereal with almond milk   Lunch: Dinner leftovers from night before. Chips or crackers and usually snacks  Dinner: Meat (ground turkey, chicken, reg meats), vegetable and grain   Typical Snacks: Granola bars, fruit snacks, nutriagrain bars, trail mix- between lunch and dinner   Overnight eating:  No  Eating out: 0-1x per week    Beverages:     Water  oz  Coffee - black   Occasional lemonade/sweet tea    Exercise: no routine at this time     Nutrition Diagnosis (PES statement):     Obesity related to excessive energy intake as evidence by BMI of 39.72     Nutrition Intervention  Food and/or Nutrient Delivery   Placed emphasis on importance of developing a healthy meal routine, aiming for 3 meals a day and no snacks.  Discussed using a protein supplement as a meal replacement.  Nutrition Education   Discussed with patient how to build a meal: the importance of including a lean/low fat protein at each meal, include a source of vegetables at a minimum of lunch and dinner and limiting carbohydrate intake to 25% per meal.  Educated on sources of lean protein, portion sizes, the amount of grams found in each source. Recommend patient to aim for 20-30g protein at each meal.  Educated on how to read a food label: keeping total fat <10g and sugar <10g per serving.    Nutrition Counseling   Encouraged importance of developing routine exercise for health benefits and weight loss.      Goals established by patient:   Aim for 5578-8624 kcal per day   Aim for 25-30g protein per day (70-90g per day)  Follow the 10/10 rule   Increase activity, aiming for 3-4k steps per day       Handouts provided:  Intor to MWM  10/10 rule  Quick meals     Assessment/Plan:    Pt will follow up in 4 month(s) with bariatrician and 4 month(s) with dietitian.       Video-Visit Details    Type of service:  Video Visit    Video Start Time (time video started): 9:03 AM    Video End Time (time video stopped): 9:30 AM    Originating Location (pt. Location): Home      Distant Location (provider location):  Off-site    Mode of Communication:  Video Conference via [facility]    Physician has received verbal consent for a Video Visit from the patient? Yes      Caryl Meyers RD

## 2024-07-09 NOTE — PATIENT INSTRUCTIONS
Eat Better ? Move More ? Live Well    Eat 3 nutrient-rich meals each day     Don't skip meals--it will cause you to overeat later in the day!     Eating fiber (vegetables/fruits/whole grains) and protein with meals helps you stay full longer     Choose foods with less than 10 grams of sugar and 5 grams of fat per serving to prevent excess calories and weight re-gain   Eat around the same times each day to develop a routine eating schedule    Avoid snacking unless physically hungry.   Planned snacks: 1-2 times per day and no more than 150 calories    Eat protein first    Protein helps with healing, maintaining adequate muscle mass, reducing hunger and optimizing nutritional status    Aim for 70-90 grams of protein per day   Fill up on Fiber    Fiber comes from plants--fruits, veggies, whole grains, nuts/seeds and beans    Fiber is low in calories, high in phytonutrients and helps you stay full longer    Aim for 25-35 grams per day by eating fiber with meals and snacks  Eat [id number]    Take 20-30 minutes to eat each meal by taking small bites, chewing foods to applesauce consistency or 20-30 times before you swallow    Eating foods too fast can delay satiety/fullness signals and increase overeating   Slow down your eating by using toddler utensils, putting your fork/spoon down between bites and not watching TV or emailing during meals!   Keep a Journal          Writing down what you eat, how you feel and when you are active helps you identify new changes to work on from week to week          Look for ways to cut 100 calories from your current diet 2-3 times per day  Drink 64 ounces of 0-Calorie drinks between meals    Water    Zero calorie Propel  or Vitamin Water      SoBe Lifewater  Zero Calories    Crystal Light , Sugar-Free [place]-Aid , and other sugar-free lemonade or flavored carrera    Keep Caffeine to less than 300mg per day ie: 3-6oz cups coffee     Work up to 45-60 minutes of physical activity most days of  the week    Helps with losing weight and prevent regaining those extra pounds!     Do a combo of cardio (walking/water exercises) and strength training (lifting weights/Vinyasa yoga)    Avoid Mindless Eating    Be present when you eat--take note of the smell, taste and quality of your food    Make a list of alternative activities you could do to prevent eating out of boredom/stress  Go for a walk, call a friend, chew gum, paint your nails, re-organize the garage, etc      LEAN PROTEIN SOURCES    Protein Source Portion Calories Grams of Protein                           Nonfat, plain Greek yogurt    (10 grams sugar or less) 3/4 cup (6 oz)  12-17   Light Yogurt (10 grams sugar or less) 3/4 cup (6 oz)  6-8   Protein Shake 1 shake 110-180 15-30   Skim/1% Milk or lactose-free milk 1 cup ( 8 oz)  8   Plain or light, flavored soymilk 1 cup  7-8   Plain or light, hemp milk 1 cup 110 6   Fat Free or 1% Cottage Cheese 1/2 cup 90 15   Part skim ricotta cheese 1/2 cup 100 14   Part skim or reduced fat cheese slices 1/4cup, 3 dice 65-80 8     Mozzarella String Cheese 1 80 8   Canned tuna, chicken, crab or salmon  (canned in water)  1/2 cup 100 15-20   White fish (broiled, grilled, baked) 3 ounces 100 21   [place]/Tuna (broiled, grilled, baked) 3 ounces 150-180 21   Shrimp, Scallops, Lobster, Crab 3 ounces 100 21   Pork loin, Pork Tenderloin 3 ounces 150 21   Boneless, skinless chicken /turkey breast                          (broiled, grilled, baked) 3 ounces 120 21   Champion, Cassia, Dunn, and Venison 3 ounces 120 21   Lean cuts of red meat and pork (sirloin,   round, tenderloin, flank, ground 93%-96%) 3 ounces 170 21   Lean or Extra Lean Ground Turkey 1/2 cup 150 20   90-95% Lean [place] Burger 1 ap 140-180 21   Low-fat casserole with lean meat 3/4 cup 200 17   Luncheon Meats                                                        (turkey, lean ham, roast beef, chicken) 3 ounces 100 21   Egg (boiled,  poached, scrambled) 1 Egg 60 7   Egg Substitute 1/2 cup 70 10   Nuts (limit to 1 serving per day)  3 Tbsp. 150 7   Nut Candor (peanut, almond)  Limit to 1 serving or less daily 1 Tbsp. 90 4   Soy Burger (varies) 1  10-15   Edamame  1/2 cup ~95 9   Garbanzo, Black, Perkins Beans 1/2 cup 110 7   Refried Beans 1/2 cup 100 7   Kidney and Lima beans 1/2 cup 110 7   Tempeh 3 oz 175 18   Vegan crumbles 1/2 cup 100 14   Tofu 1/2 cup 110 14   Chili (beans and extra lean beef or turkey) 1 cup 200 23   Lentil Stew/Soup 1 cup 150 12   Black Bean Soup 1 cup 175 12     Carbohydrates  Carbohydrates fuel your body with glucose (sugar)--the energy your body needs so you can do your daily activities.  Carbohydrates offer an immediate source of energy for your body. They provide the fuel for your muscles and organs, such as your brain.     Types of Carbohydrates     Complex Carbohydrates are higher in fiber and keep you feeling full longer--helping you eat less.   These are found in nearly all plant-based foods and usually take longer for the body to digest.  They are most commonly found in whole-wheat bread, whole-grain pasta, brown rice, starchy vegetables,   and fruits  Refined Carbohydrates require almost NO WORK for digestion and break down into glucose more quickly   than complex carbohydrates. Refined carbohydrates are usually high in calories and low in nutrients and fiber--  eating more of these can lead to weight gain.  Thinking about eliminating carbohydrates???  If you do not eat enough carbohydrates, the following can occur:  Fatigue  Muscle cramps  Poor mental function  Fatigue easily results from deprivation of carbohydrates, which is seen in people who fast, possibly interfering with activities of daily living.      Thinking about eliminating carbohydrates???  If you do not eat enough carbohydrates, the following can occur:  Fatigue  Muscle cramps  Poor mental function  Fatigue easily results from deprivation of  carbohydrates, which is seen in people who fast, possibly interfering with activities of daily living    Carbohydrates are your body's first choice for fuel. If given a choice of several types of foods simultaneously, your body will use the energy from carbohydrates first.    What foods contain carbohydrates?  Choose the following foods containing carbohydrates (the BEST ones to eat):   Fruit-fresh, frozen, canned in their own juices  Whole grains:  Whole-wheat breads  Brown rice  Oatmeal  Whole-grain cereals  Other starchy foods containing a minimum of 3 grams (g) fiber/100 calories  The ingredient label should list whole wheat or whole grain as one of the first ingredients (bulgur, quinoa, buckwheat, millet, spelt, faro, kasha)  Milk or yogurt (a natural source of carbohydrates):  Low-fat milk  Fat-free milk  Yogurt   Beans or legumes     Starchy vegetables, raw or frozen:  Potatoes  Peas  Corn    AVOID or limit the following foods containing carbohydrates:  Refined sugars, such as in:  Candy  Desserts-ice cream, cakes, pies, brownies, frozen yogurt, sherbet/sorbet  Cookies  White flour: bread/pasta/crackers/rice/tortillas  Sugary snacks: sweetened cereal, granola bars, cereal bars, donuts, muffins, bagels  Sugary Drinks:  Fruit Juice, Smoothies  Sports Drinks  Regular Soda    What are typical serving sizes or portions?  The following are some serving and portion sizes for foods containing carbohydrates:  One medium piece of fruit, about 4?5 ounces (oz) (-tennis ball)  1 cup (C) berries or melon    C canned fruit    C juice (100% vegetable)    C starchy vegetables, cooked or chopped  One slice whole-grain bread  ? C brown rice, quinoa, buckwheat, millet, spelt, faro, kasha    C oatmeal (dry)    C bulgur  One small tortilla (less than 6inch diameter)    C wheat germ  1 oz pretzels     C flaked cereal        Calorie-Controlled Sample Meal Plans    Examples of small healthy meals    Breakfast   Omelet made with   cup  to   cup egg substitute or 2 eggs    cup chopped vegetables  1-2 tbsp. of light cheese     cup salsa  Medium banana    1 cup non-fat plain, Greek yogurt mixed with 1 cup berries and 1-2 Tbsp nuts or cereal   -3/4 cup skim or 1% cottage cheese    cup unsweetened whole-grain cereal  1/2 cup of fresh strawberries  Whole-wheat English muffin or mini bagel, 1 scrambled egg and 1 slice Swiss cheese   Small orange  Protein Bar or Shake (15-30 grams protein and 15-25 grams Carbohydrates)    cup cottage cheese, low-fat    cup fresh fruit    11 ounces of Slim Fast Low Carb (only), Jose's Advantage, EAS Carb Control    Lunch/Dinner  2-3 slices roasted turkey breast  1 tbsp. of fat free mayonnaise  2 slices of  whole-wheat bread, Medium apple  10 baby carrots with 1 tbsp. of low-fat dip     cup water packed tuna or chicken  1 tablespoons of low-fat mayonnaise  1-2 tbsp. dill relish  1 serving of whole-grain crackers  1 cup of strawberries   6 inch turkey sub sandwich with light mayonnaise,   cup cottage cheese                                                                                                                                                      Black bean and low-fat cheese on a whole wheat tortilla with salsa and light sour cream  Grilled chicken sandwich  Tossed salad with light dressing    Baked potato with 3/4 cup of extra lean ground beef, light shredded cheese and salsa  Fresh fruit                                                 Chicken chunks with lettuce and vegetables stuffed in maggie  Steamed broccoli                                                 3 oz boneless/skinless chicken breast  1/2 cup brown rice with stir-fried vegetables    grapefruit  3 ounces of salmon, trout, or tuna  1 cup of steamed asparagus  1 small slice whole grain Italian bread  Broiled white or pink fish  3/4 cup whole wheat pasta with tomatoes  3/4 cup of roasted red peppers  3 oz. of extra lean (93/7) hamburger on a Arnold's  Marshall Thins  Tossed salad with light dressing       Black bean or Tuscan bean soup with grated mozzarella cheese    of a flour tortilla    3 ounces of grilled pork loin with 1 tbsp. of low-sugar barbeque sauce, 1 cup of green beans seasoned with pepper  Small dinner roll or   cup of grapefruit sections    1-2 cups of torn tiki    cup of garbanzo beans or diced skinless chicken breast  5-6 cherry tomatoes  1  tbsp. of crumbled feta cheese  1 tbsp. of roasted soy nuts  1 tsp. of olive oil and 2-3 Tbsp. of balsamic or red wine vinegar  Small whole-wheat dinner roll or   cup of cut up pineapple         Quick and Easy Meals    Salad kit with rotisserie chicken, eggs, lunch meat, beans or tuna    Chicken nuggets on top of Caesar salad kit     Lunch meat sandwich or wrap with veggies (sugar snap peas, bell pepper slices, carrot sticks, celery, sliced cucumber, cherry tomatoes, etc.)    Greek or light yogurt with a handful of nuts and fruit    Cottage cheese, cherry tomatoes and Triscuit crackers    Refried bean and cheese quesadilla on whole wheat tortilla    Can of Progresso Light or Camb[place] Well Yes soup - add additional leftover protein like chicken to boost protein    Plains cakes pancake or waffles with peanut butter or berries    Baked sweet potato with black beans and salsa and a side salad    Adult lunchable: lunch meat, string cheese, crackers and veggies    Tuna Cucumber Boats: cut cucumber in half, scoop out cucumber seeds, fill with mixture of tuna, light adames, renay mustard, red onion, banana peppers, dried dill, salt and pepper    Protein pasta salad: whole wheat or bean pasta with chicken sausage, broccoli and italian dressing    Egg sandwich on english muffin: egg, slice of cheese and piece of ham    Grilled cheese and turkey sandwich with a side salad or cup of soup    BBQ Flatbread: Flat Out high protein flatbread with rotisserie chicken, BBQ sauce and red onion, topped with mozzarella cheese and  baked in the oven    [place] Chicken Wrap: Rotisserie chicken warmed up with Torey's Hot Sauce in a medium size tortilla with light ranch dressing. Add mixed greens, red onion, diced tomato, 2% shredded cheddar cheese.    Cottage cheese Pizza Bowl - mix cottage cheese, marinara, mozzarella cheese, turkey pepperonis, italian seasoning, onions, bell peppers, heated up, serve with bell peppers and/or crackers    Cottage cheese Taco Bowl - mix cottage cheese, ground beef with taco seasoning, shredded cheese, lettuce, tomato, heated up    Greek cottage cheese bowl - mix cottage cheese, dill, vegetables (cucumber, bell pepper, cucumber, cherry tomatoes), salt and pepper, olives, feta, a little drizzle of olive oil     Whole wheat or bean pasta with pesto and chicken sausage     Sheet Pan Dinner: Chicken sausage, herbed baby potatoes, asparagus, carrots, and onions roasted in olive oil    Omelet with chicken or beans, low-fat cheese, veggies (bell pepper, tomato, spinach, mushroom, onions), and salsa    Whole wheat toast topped with mashed avocado, sliced tomato, and a fried egg     Berry Salad: Spinach/lettuce, berries, grilled chicken/salmon/tofu, low-fat feta cheese, with vinaigrette dressing     Otto-Parish Salad: Mixed greens, chicken breast, black beans, corn, diced tomatoes, green onions, cheddar cheese, cilantro, crushed tortilla chips, and a dressing of light ranch mixed with taco seasoning    Sweet potato, bell pepper, chickpeas, onion, and tomato sauteed in light oil with spices of choice (paprika, cumin, riaz, garlic, cayenne, black pepper)    Cranberry Apple Quinoa Salad: quinoa, grilled chicken, diced apple, celery, green onion, unsweetened dried cranberries, and chopped pecans, with a dressing of olive oil, renay mustard, and honey    Asian Cabbage Salad: Sliced green and red cabbage, sliced bell pepper, edamame, shredded carrots, cilantro, slivered almonds, and grilled chicken or tofu, with riaz peanut  dressing

## 2024-07-12 ENCOUNTER — OFFICE VISIT (OUTPATIENT)
Dept: SURGERY | Facility: CLINIC | Age: 39
End: 2024-07-12
Payer: COMMERCIAL

## 2024-07-12 VITALS
WEIGHT: 266.6 LBS | TEMPERATURE: 97.9 F | HEIGHT: 66 IN | BODY MASS INDEX: 42.85 KG/M2 | SYSTOLIC BLOOD PRESSURE: 134 MMHG | DIASTOLIC BLOOD PRESSURE: 80 MMHG

## 2024-07-12 DIAGNOSIS — Z90.49 S/P LAPAROSCOPIC CHOLECYSTECTOMY: Primary | ICD-10-CM

## 2024-07-12 PROCEDURE — 99024 POSTOP FOLLOW-UP VISIT: CPT | Performed by: SURGERY

## 2024-07-12 ASSESSMENT — PAIN SCALES - GENERAL: PAINLEVEL: MILD PAIN (2)

## 2024-07-12 NOTE — PROGRESS NOTES
General Surgery Follow Up    Pt returns for follow up visit s/p robotic cholecystectomy    HPI:  Doing great.  No acute concerns.      Past Medical History:   Diagnosis Date    Gastro-oesophageal reflux disease     Heartburn during pregnancy in third trimester 2017    Hypertension     Lactose intolerance     Motion sickness     PIH (pregnancy induced hypertension) 2017    Rh(-), needs rhogam at 28 wks gestation 2017    S/P  section 2017       Past Surgical History:   Procedure Laterality Date     SECTION N/A 2017    Procedure:  SECTION;   Section;  Surgeon: Zechariah Mcdonald MD;  Location: [facility]    COLONOSCOPY N/A 2023    Procedure: COLONOSCOPY, WITH POLYPECTOMY AND BIOPSY;  Surgeon: Ashvin Collier DO;  Location: [facility] GI    HC EXCIS PRIMARY GANGLION WRIST  3/24/2004    Excision, volar wrist ganglion, left.    HC EXCIS RECURRENT GANGLION WRIST  2006    Recurrent volar wrist ganglion, left.    LAPAROSCOPIC CHOLECYSTECTOMY N/A 2024    Procedure: CHOLECYSTECTOMY, ROBOT-ASSISTED, LAPAROSCOPIC, USING DA [place] XI;  Surgeon: Ashvin Collier DO;  Location: [facility] OR    OPEN REDUCTION INTERNAL FIXATION WRIST      Cadavar bone       Social History     Socioeconomic History    Marital status:      Spouse name: Errol    Number of children: 1    Years of education: Not on file    Highest education level: Not on file   Occupational History    Occupation:      Comment: [employer]   Tobacco Use    Smoking status: Former     Current packs/day: 0.50     Types: Cigarettes    Smokeless tobacco: Never   Vaping Use    Vaping status: Every Day   Substance and Sexual Activity    Alcohol use: Yes     Alcohol/week: 0.0 standard drinks of alcohol     Comment: 2x a month not while pregnant    Drug use: No    Sexual activity: Yes     Partners: Male     Comment:  had a vasectomy   Other Topics Concern      Service No    Blood Transfusions No    Caffeine Concern No     Comment: pop- 1-2 times qweek    Occupational Exposure Yes     Comment: TB    Hobby Hazards No    Sleep Concern No    Stress Concern No    Weight Concern No    Special Diet Yes     Comment: lactose intol    Back Care No    Exercise Yes     Comment: curves    Bike Helmet No    Seat Belt Yes    Self-Exams Yes    Parent/sibling w/ CABG, MI or angioplasty before 65F 55M? Not Asked   Social History Narrative    Lives in [place] with Errol and their daughter Teri.  They both smoke.  She is trying to quit.  Discussed risks of exposure to secondhand smoke.  Advised about toxoplasmosis precautions.       Social Determinants of Health     Financial Resource Strain: Low Risk  (7/16/2020)    Overall Financial Resource Strain (CARDIA)     Difficulty of Paying Living Expenses: Not hard at all   Food Insecurity: No Food Insecurity (7/16/2020)    Hunger Vital Sign     Worried About Running Out of Food in the Last Year: Never true     Ran Out of Food in the Last Year: Never true   Transportation Needs: No Transportation Needs (7/16/2020)    PRAPARE - Transportation     Lack of Transportation (Medical): No     Lack of Transportation (Non-Medical): No   Physical Activity: Insufficiently Active (7/16/2020)    Exercise Vital Sign     Days of Exercise per Week: 7 days     Minutes of Exercise per Session: 20 min   Stress: Stress Concern Present (7/16/2020)    [place] [employer] - Occupational Stress Questionnaire     Feeling of Stress : Rather much   Social Connections: Moderately Integrated (7/16/2020)    Social Connection and Isolation Panel [NHANES]     Frequency of Communication with Friends and Family: More than three times a week     Frequency of Social Gatherings with Friends and Family: Once a week     Attends Nondenominational Services: Never     Active Member of Clubs or Organizations: Yes     Attends Club or Organization Meetings: 1 to 4 times per year  "    Marital Status:    Interpersonal Safety: Low Risk  (3/14/2024)    Interpersonal Safety     Do you feel physically and emotionally safe where you currently live?: Yes     Within the past 12 months, have you been hit, slapped, kicked or otherwise physically hurt by someone?: No     Within the past 12 months, have you been humiliated or emotionally abused in other ways by your partner or ex-partner?: No   Housing Stability: Not on file       Current Outpatient Medications   Medication Sig Dispense Refill    albuterol (PROAIR HFA/PROVENTIL HFA/VENTOLIN HFA) 108 (90 Base) MCG/ACT inhaler Inhale 1-2 puffs into the lungs every 6 hours 18 g 1    FLUoxetine (PROZAC) 10 MG capsule Take 1 capsule (10 mg) by mouth daily 90 capsule 1    hydrochlorothiazide (HYDRODIURIL) 25 MG tablet Take 1 tablet (25 mg) by mouth daily 90 tablet 3    lisinopril (ZESTRIL) 20 MG tablet TAKE ONE TABLET BY MOUTH ONCE DAILY 90 tablet 3    metoprolol succinate ER (TOPROL XL) 100 MG 24 hr tablet TAKE ONE TABLET BY MOUTH ONCE DAILY 90 tablet 3    montelukast (SINGULAIR) 10 MG tablet TAKE ONE TABLET BY MOUTH AT BEDTIME 90 tablet 0    omeprazole (PRILOSEC) 20 MG DR capsule TAKE 1 CAPSULE BY MOUTH ONCE DAILY 90 capsule 1    oxyCODONE-acetaminophen (PERCOCET) 5-325 MG tablet Take 1-2 tablets by mouth every 4 hours as needed for pain (Patient not taking: Reported on 7/12/2024) 12 tablet 0       Medications and history reviewed    Physical exam:  Vitals: /80   Temp 97.9  F (36.6  C) (Temporal)   Ht 1.676 m (5' 5.98\")   Wt 120.9 kg (266 lb 9.6 oz)   LMP 05/23/2024 (Approximate)   BMI 43.06 kg/m    BMI= Body mass index is 43.06 kg/m .    HEART: RRR, no new murmurs  LUNGS: CTAB, equal chest rise, good effort  ABD: soft, non tender, non distended  INCISIONS: Clean dry intact, mild ecchymosis  EXT: GOMEZ, no deformities    PATHOLOGY:  Chronic cholecystitis    Assessment:     ICD-10-CM    1. S/P laparoscopic cholecystectomy  Z90.49         Plan: " Doing well.  Pathology reviewed and questions answered.  IntraOp photos reviewed.  Follow-up as needed.    15 minutes spent by me on the date of the encounter doing chart review, history and exam, documentation and further activities per the note    Ashvin Collier, DO

## 2024-07-12 NOTE — LETTER
2024      Shari Arshad  [address] [facility] [id number]      Dear Colleague,    Thank you for referring your patient, Shari Arshad, to the [facility]. Please see a copy of my visit note below.    General Surgery Follow Up    Pt returns for follow up visit s/p robotic cholecystectomy    HPI:  Doing great.  No acute concerns.      Past Medical History:   Diagnosis Date     Gastro-oesophageal reflux disease      Heartburn during pregnancy in third trimester 2017     Hypertension      Lactose intolerance      Motion sickness      PIH (pregnancy induced hypertension) 2017     Rh(-), needs rhogam at 28 wks gestation 2017     S/P  section 2017       Past Surgical History:   Procedure Laterality Date      SECTION N/A 2017    Procedure:  SECTION;   Section;  Surgeon: Zechariah Mcdonald MD;  [facility]     COLONOSCOPY N/A 2023    Procedure: COLONOSCOPY, WITH POLYPECTOMY AND BIOPSY;  Surgeon: Ashvin Collier DO;  [facility]     HC EXCIS PRIMARY GANGLION WRIST  3/24/2004    Excision, volar wrist ganglion, left.     HC EXCIS RECURRENT GANGLION WRIST  2006    Recurrent volar wrist ganglion, left.     LAPAROSCOPIC CHOLECYSTECTOMY N/A 2024    Procedure: CHOLECYSTECTOMY, ROBOT-ASSISTED, LAPAROSCOPIC, USING DA [place] XI;  Surgeon: Ashvin Collier DO;  Location[facility]     OPEN REDUCTION INTERNAL FIXATION WRIST      Cadavar bone       Social History     Socioeconomic History     Marital status:      Spouse name: Errol     Number of children: 1     Years of education: Not on file     Highest education level: Not on file   Occupational History     Occupation:      Comment: [employer]   Tobacco Use     Smoking status: Former     Current packs/day: 0.50     Types: Cigarettes     Smokeless tobacco: Never   Vaping Use     Vaping status: Every Day   Substance and  Sexual Activity     Alcohol use: Yes     Alcohol/week: 0.0 standard drinks of alcohol     Comment: 2x a month not while pregnant     Drug use: No     Sexual activity: Yes     Partners: Male     Comment:  had a vasectomy   Other Topics Concern      Service No     Blood Transfusions No     Caffeine Concern No     Comment: pop- 1-2 times qweek     Occupational Exposure Yes     Comment: TB     Hobby Hazards No     Sleep Concern No     Stress Concern No     Weight Concern No     Special Diet Yes     Comment: lactose intol     Back Care No     Exercise Yes     Comment: curves     Bike Helmet No     Seat Belt Yes     Self-Exams Yes     Parent/sibling w/ CABG, MI or angioplasty before 65F 55M? Not Asked   Social History Narrative    Lives in [place] with Errol and their daughter Teri.  They both smoke.  She is trying to quit.  Discussed risks of exposure to secondhand smoke.  Advised about toxoplasmosis precautions.       Social Determinants of Health     Financial Resource Strain: Low Risk  (7/16/2020)    Overall Financial Resource Strain (CARDIA)      Difficulty of Paying Living Expenses: Not hard at all   Food Insecurity: No Food Insecurity (7/16/2020)    Hunger Vital Sign      Worried About Running Out of Food in the Last Year: Never true      Ran Out of Food in the Last Year: Never true   Transportation Needs: No Transportation Needs (7/16/2020)    PRAPARE - Transportation      Lack of Transportation (Medical): No      Lack of Transportation (Non-Medical): No   Physical Activity: Insufficiently Active (7/16/2020)    Exercise Vital Sign      Days of Exercise per Week: 7 days      Minutes of Exercise per Session: 20 min   Stress: Stress Concern Present (7/16/2020)    [place] [employer] - Occupational Stress Questionnaire      Feeling of Stress : Rather much   Social Connections: Moderately Integrated (7/16/2020)    Social Connection and Isolation Panel [NHANES]      Frequency of  "Communication with Friends and Family: More than three times a week      Frequency of Social Gatherings with Friends and Family: Once a week      Attends Episcopal Services: Never      Active Member of Clubs or Organizations: Yes      Attends Club or Organization Meetings: 1 to 4 times per year      Marital Status:    Interpersonal Safety: Low Risk  (3/14/2024)    Interpersonal Safety      Do you feel physically and emotionally safe where you currently live?: Yes      Within the past 12 months, have you been hit, slapped, kicked or otherwise physically hurt by someone?: No      Within the past 12 months, have you been humiliated or emotionally abused in other ways by your partner or ex-partner?: No   Housing Stability: Not on file       Current Outpatient Medications   Medication Sig Dispense Refill     albuterol (PROAIR HFA/PROVENTIL HFA/VENTOLIN HFA) 108 (90 Base) MCG/ACT inhaler Inhale 1-2 puffs into the lungs every 6 hours 18 g 1     FLUoxetine (PROZAC) 10 MG capsule Take 1 capsule (10 mg) by mouth daily 90 capsule 1     hydrochlorothiazide (HYDRODIURIL) 25 MG tablet Take 1 tablet (25 mg) by mouth daily 90 tablet 3     lisinopril (ZESTRIL) 20 MG tablet TAKE ONE TABLET BY MOUTH ONCE DAILY 90 tablet 3     metoprolol succinate ER (TOPROL XL) 100 MG 24 hr tablet TAKE ONE TABLET BY MOUTH ONCE DAILY 90 tablet 3     montelukast (SINGULAIR) 10 MG tablet TAKE ONE TABLET BY MOUTH AT BEDTIME 90 tablet 0     omeprazole (PRILOSEC) 20 MG DR capsule TAKE 1 CAPSULE BY MOUTH ONCE DAILY 90 capsule 1     oxyCODONE-acetaminophen (PERCOCET) 5-325 MG tablet Take 1-2 tablets by mouth every 4 hours as needed for pain (Patient not taking: Reported on 7/12/2024) 12 tablet 0       Medications and history reviewed    Physical exam:  Vitals: /80   Temp 97.9  F (36.6  C) (Temporal)   Ht 1.676 m (5' 5.98\")   Wt 120.9 kg (266 lb 9.6 oz)   LMP 05/23/2024 (Approximate)   BMI 43.06 kg/m    BMI= Body mass index is 43.06 " kg/m .    HEART: RRR, no new murmurs  LUNGS: CTAB, equal chest rise, good effort  ABD: soft, non tender, non distended  INCISIONS: Clean dry intact, mild ecchymosis  EXT: GOMEZ, no deformities    PATHOLOGY:  Chronic cholecystitis    Assessment:     ICD-10-CM    1. S/P laparoscopic cholecystectomy  Z90.49         Plan: Doing well.  Pathology reviewed and questions answered.  IntraOp photos reviewed.  Follow-up as needed.    15 minutes spent by me on the date of the encounter doing chart review, history and exam, documentation and further activities per the note    Ashvin Collier DO      Again, thank you for allowing me to participate in the care of your patient.        Sincerely,        Ashvin Collier DO

## 2024-08-15 ENCOUNTER — MYC REFILL (OUTPATIENT)
Dept: FAMILY MEDICINE | Facility: CLINIC | Age: 39
End: 2024-08-15
Payer: COMMERCIAL

## 2024-08-15 DIAGNOSIS — J30.1 SEASONAL ALLERGIC RHINITIS DUE TO POLLEN: ICD-10-CM

## 2024-08-15 DIAGNOSIS — I10 BENIGN ESSENTIAL HYPERTENSION: ICD-10-CM

## 2024-08-16 RX ORDER — MONTELUKAST SODIUM 10 MG/1
TABLET ORAL
Qty: 90 TABLET | Refills: 0 | OUTPATIENT
Start: 2024-08-16

## 2024-08-16 RX ORDER — METOPROLOL SUCCINATE 100 MG/1
100 TABLET, EXTENDED RELEASE ORAL DAILY
Qty: 90 TABLET | Refills: 0 | Status: SHIPPED | OUTPATIENT
Start: 2024-08-16

## 2024-08-16 RX ORDER — MONTELUKAST SODIUM 10 MG/1
1 TABLET ORAL AT BEDTIME
Qty: 90 TABLET | Refills: 0 | Status: SHIPPED | OUTPATIENT
Start: 2024-08-16

## 2024-08-16 RX ORDER — METOPROLOL SUCCINATE 100 MG/1
100 TABLET, EXTENDED RELEASE ORAL DAILY
Qty: 90 TABLET | Refills: 3 | OUTPATIENT
Start: 2024-08-16

## 2024-09-12 DIAGNOSIS — I10 BENIGN ESSENTIAL HYPERTENSION: ICD-10-CM

## 2024-09-12 RX ORDER — LISINOPRIL 20 MG/1
TABLET ORAL
Qty: 90 TABLET | Refills: 2 | Status: SHIPPED | OUTPATIENT
Start: 2024-09-12

## 2024-10-24 ENCOUNTER — PATIENT OUTREACH (OUTPATIENT)
Dept: CARE COORDINATION | Facility: CLINIC | Age: 39
End: 2024-10-24
Payer: COMMERCIAL

## 2024-11-14 ENCOUNTER — MYC REFILL (OUTPATIENT)
Dept: FAMILY MEDICINE | Facility: CLINIC | Age: 39
End: 2024-11-14
Payer: COMMERCIAL

## 2024-11-14 DIAGNOSIS — J30.1 SEASONAL ALLERGIC RHINITIS DUE TO POLLEN: ICD-10-CM

## 2024-11-14 DIAGNOSIS — I10 BENIGN ESSENTIAL HYPERTENSION: ICD-10-CM

## 2024-11-15 DIAGNOSIS — I10 BENIGN ESSENTIAL HYPERTENSION: ICD-10-CM

## 2024-11-15 RX ORDER — MONTELUKAST SODIUM 10 MG/1
1 TABLET ORAL AT BEDTIME
Qty: 90 TABLET | Refills: 0 | OUTPATIENT
Start: 2024-11-15

## 2024-11-15 RX ORDER — METOPROLOL SUCCINATE 100 MG/1
100 TABLET, EXTENDED RELEASE ORAL DAILY
Qty: 90 TABLET | Refills: 0 | Status: SHIPPED | OUTPATIENT
Start: 2024-11-15

## 2024-11-15 RX ORDER — MONTELUKAST SODIUM 10 MG/1
1 TABLET ORAL
Qty: 90 TABLET | Refills: 0 | Status: SHIPPED | OUTPATIENT
Start: 2024-11-15

## 2024-11-15 RX ORDER — METOPROLOL SUCCINATE 100 MG/1
100 TABLET, EXTENDED RELEASE ORAL DAILY
Qty: 90 TABLET | Refills: 0 | OUTPATIENT
Start: 2024-11-15

## 2024-11-22 ENCOUNTER — APPOINTMENT (OUTPATIENT)
Dept: GENERAL RADIOLOGY | Facility: CLINIC | Age: 39
End: 2024-11-22
Attending: NURSE PRACTITIONER
Payer: COMMERCIAL

## 2024-11-22 ENCOUNTER — HOSPITAL ENCOUNTER (EMERGENCY)
Facility: CLINIC | Age: 39
Discharge: HOME | End: 2024-11-22
Attending: NURSE PRACTITIONER | Admitting: NURSE PRACTITIONER
Payer: COMMERCIAL

## 2024-11-22 VITALS
HEART RATE: 50 BPM | WEIGHT: 266 LBS | TEMPERATURE: 98 F | BODY MASS INDEX: 42.96 KG/M2 | RESPIRATION RATE: 18 BRPM | SYSTOLIC BLOOD PRESSURE: 150 MMHG | DIASTOLIC BLOOD PRESSURE: 76 MMHG | OXYGEN SATURATION: 98 %

## 2024-11-22 DIAGNOSIS — M25.532 LEFT WRIST PAIN: ICD-10-CM

## 2024-11-22 DIAGNOSIS — M77.9 ACUTE CALCIFIC PERIARTHRITIS: ICD-10-CM

## 2024-11-22 PROCEDURE — 73110 X-RAY EXAM OF WRIST: CPT | Mod: LT

## 2024-11-22 PROCEDURE — 99283 EMERGENCY DEPT VISIT LOW MDM: CPT | Performed by: NURSE PRACTITIONER

## 2024-11-22 ASSESSMENT — ACTIVITIES OF DAILY LIVING (ADL)
ADLS_ACUITY_SCORE: 0
ADLS_ACUITY_SCORE: 0

## 2024-11-22 ASSESSMENT — COLUMBIA-SUICIDE SEVERITY RATING SCALE - C-SSRS
2. HAVE YOU ACTUALLY HAD ANY THOUGHTS OF KILLING YOURSELF IN THE PAST MONTH?: NO
6. HAVE YOU EVER DONE ANYTHING, STARTED TO DO ANYTHING, OR PREPARED TO DO ANYTHING TO END YOUR LIFE?: NO
1. IN THE PAST MONTH, HAVE YOU WISHED YOU WERE DEAD OR WISHED YOU COULD GO TO SLEEP AND NOT WAKE UP?: NO

## 2024-11-22 NOTE — DISCHARGE INSTRUCTIONS
Wear the wrist splint 24/7 for the next 2 weeks. May remove for short periods of time to shower.  Tylenol 650 mg every 4-6 hours as needed for pain.  Ibuprofen 400-600 mg every 6-8 hours as needed for pain  (take with food, stop if causing stomach pains.)  Follow-up with orthopedics. Referral has been sent. They should call you or you can contact them to set-up appointment [phone].

## 2024-11-22 NOTE — ED PROVIDER NOTES
History     Chief Complaint   Patient presents with    Wrist Pain     HPI  Shari Arshad is a 39 year old female who presents for evaluation of left wrist pain and swelling.  Most of the pain and swelling is along the radial aspect.  Pain started yesterday, she noticed it upon awakening.  She did fall from a deer stand a few weeks ago but used her right hand to try to break her fall and catch herself.  She suffered multiple bruising/contusions to her left leg.  She does not recall injuring her left hand or wrist during that time.    Allergies:  Allergies   Allergen Reactions    Fruit Extracts Hives     Fruits from trees only if she touches them    Milk [Milk (Cow)] GI Disturbance    No Known Drug Allergy        Problem List:    Patient Active Problem List    Diagnosis Date Noted    Tobacco use disorder 03/14/2024     Priority: Medium    Abdominal pain, generalized 03/14/2024     Priority: Medium    Abdominal pain, right upper quadrant 03/14/2024     Priority: Medium    Lower abdominal pain 04/04/2023     Priority: Medium    LINWOOD 09/02/2020     Priority: Medium    PMS (premenstrual syndrome) 07/17/2020     Priority: Medium    Paresthesia of foot, bilateral 07/17/2020     Priority: Medium    Morbid obesity (H) 02/14/2019     Priority: Medium    Benign essential hypertension, postpartum 09/07/2017     Priority: Medium    Benign essential hypertension 08/29/2017     Priority: Medium    Heartburn 06/20/2017     Priority: Medium    Lactose intolerances 01/06/2013     Priority: Medium     Problem list name updated by automated process. Provider to review and confirm      Esophageal reflux 08/22/2006     Priority: Medium        Past Medical History:    Past Medical History:   Diagnosis Date    Gastro-oesophageal reflux disease     Heartburn during pregnancy in third trimester 06/20/2017    Hypertension     Lactose intolerance     Motion sickness     PIH (pregnancy induced hypertension) 08/29/2017    Rh(-), needs rhogam  at 28 wks gestation 2017    S/P  section 2017       Past Surgical History:    Past Surgical History:   Procedure Laterality Date     SECTION N/A 2017    Procedure:  SECTION;   Section;  Surgeon: Zechariah Mcdonald MD;  [facility]    COLONOSCOPY N/A 2023    Procedure: COLONOSCOPY, WITH POLYPECTOMY AND BIOPSY;  Surgeon: Ashvin Collier DO;  [facility]    HC EXCIS PRIMARY GANGLION WRIST  3/24/2004    Excision, volar wrist ganglion, left.    HC EXCIS RECURRENT GANGLION WRIST  2006    Recurrent volar wrist ganglion, left.    LAPAROSCOPIC CHOLECYSTECTOMY N/A 2024    Procedure: CHOLECYSTECTOMY, ROBOT-ASSISTED, LAPAROSCOPIC, USING DA [place] XI;  Surgeon: Ashvin Collier DO;  [facility]    OPEN REDUCTION INTERNAL FIXATION WRIST      Cadavar bone       Family History:    Family History   Problem Relation Age of Onset    Lipids Mother     Musculoskeletal Disorder Mother         ms    Allergies Mother     Depression Mother     Genitourinary Problems Mother     Respiratory Mother         CPAP for sleep apnea    Multiple Sclerosis Mother     Pulmonary Embolism Mother         secondary to flying long periods of time and a recent fracture    Hypertension Father     Lipids Father     Ulcerative Colitis Father     Allergies Sister         2nd oldest    Asthma Sister         oldest    Psychotic Disorder Sister         depression (s/p an amputation at age 3)    Respiratory Sister         CPAP for sleep apnea    Diabetes Sister         adult-onset    Post-Traumatic Stress Disorder (PTSD) Brother         service connected    Depression Brother         4th oldest    Crohn's Disease Brother     Inflammatory Bowel Disease Brother 38        Chron's    Hypertension Brother         3rd oldest    Cerebrovascular Disease Maternal Grandmother     Cancer Maternal Grandmother         ovarian    Gynecology Maternal Grandmother         ovarian ca     Hypertension Paternal Grandmother     Hypertension Paternal Grandfather     Diabetes Maternal Uncle         type 2       Social History:  Marital Status:   [2]  Social History     Tobacco Use    Smoking status: Former     Current packs/day: 0.50     Types: Cigarettes    Smokeless tobacco: Never   Vaping Use    Vaping status: Every Day   Substance Use Topics    Alcohol use: Yes     Alcohol/week: 0.0 standard drinks of alcohol     Comment: 2x a month not while pregnant    Drug use: No        Medications:    albuterol (PROAIR HFA/PROVENTIL HFA/VENTOLIN HFA) 108 (90 Base) MCG/ACT inhaler  FLUoxetine (PROZAC) 10 MG capsule  hydrochlorothiazide (HYDRODIURIL) 25 MG tablet  lisinopril (ZESTRIL) 20 MG tablet  metoprolol succinate ER (TOPROL XL) 100 MG 24 hr tablet  montelukast (SINGULAIR) 10 MG tablet  omeprazole (PRILOSEC) 20 MG DR capsule  oxyCODONE-acetaminophen (PERCOCET) 5-325 MG tablet          Review of Systems  As mentioned above in the history present illness. All other systems were reviewed and are negative.    Physical Exam   BP: (!) 150/76  Pulse: 50  Temp: 98  F (36.7  C)  Resp: 18  Weight: 120.7 kg (266 lb)  SpO2: 98 %      Physical Exam  Constitutional:       General: She is not in acute distress.     Appearance: Normal appearance. She is well-developed. She is not ill-appearing.   HENT:      Head: Normocephalic and atraumatic.      Right Ear: External ear normal.      Left Ear: External ear normal.      Nose: Nose normal.      Mouth/Throat:      Mouth: Mucous membranes are moist.   Eyes:      Conjunctiva/sclera: Conjunctivae normal.   Cardiovascular:      Rate and Rhythm: Normal rate.   Pulmonary:      Effort: Pulmonary effort is normal.   Musculoskeletal:         General: Normal range of motion.      Left wrist: Swelling (radial aspect), tenderness (radial aspect) and snuff box tenderness present. Normal range of motion.   Skin:     General: Skin is warm and dry.      Findings: No rash.    Neurological:      General: No focal deficit present.      Mental Status: She is alert and oriented to person, place, and time.         ED Course        Procedures              Results for orders placed or performed during the hospital encounter of 11/22/24 (from the past 24 hours)   XR Wrist Left G/E 3 Views    Narrative    XR WRIST LEFT G/E 3 VIEWS   11/22/2024 2:28 PM     HISTORY: pain mostly radial aspect. no known injury.  COMPARISON: None.       Impression    IMPRESSION:     Negative for acute left wrist fracture or dislocation. There are small  foci of mineralization along the radial aspect of the radiocarpal  joint. These are nonspecific but could reflect small intra-articular  bodies or calcium hydroxyapatite deposition (calcific periarthritis).  MRI could better assess.    JANNA CEDENO MD         SYSTEM ID:  [id number]       Medications - No data to display    Assessments & Plan (with Medical Decision Making)     39 year old female acute left wrist pain for the last 2 days.. No known recent injury. She did fall from a deer stand a few weeks ago but had no left wrist pain at that time.  Xray of the left wrist shows small foci of mineralization along the radial aspect of the radiocarpal joint.  This is nonspecific but could reflect small intra-articular bodies or calcium hydroxyapatite deposition.  This is in the region of her pain.  Patient was fitted with a left Velcro wrist splint.  I recommend close follow-up with orthopedics.    Plan as follows:  Wear the wrist splint 24/7 for the next 2 weeks. May remove for short periods of time to shower.  Tylenol 650 mg every 4-6 hours as needed for pain.  Ibuprofen 400-600 mg every 6-8 hours as needed for pain  (take with food, stop if causing stomach pains.)  Follow-up with orthopedics. Referral has been sent. They should call you or you can contact them to set-up appointment [phone].      Discharge Medication List as of 11/22/2024  3:07 PM           Final diagnoses:   Left wrist pain   Acute calcific periarthritis       11/22/2024   [facility] EMERGENCY DEPT       Nichelle, QASIM Palm CNP  11/22/24 [id number]

## 2024-11-25 ENCOUNTER — PATIENT OUTREACH (OUTPATIENT)
Dept: CARE COORDINATION | Facility: CLINIC | Age: 39
End: 2024-11-25
Payer: COMMERCIAL

## 2024-11-26 NOTE — PROGRESS NOTES
Shari Arshad  :  1985  DOS: 2024  MRN: [id number]  PCP: Pratibha Merino    Sports Medicine Clinic Visit      HPI  Shari Arshad is a 39 year old female who is seen in consultation at the request of  Connie Steward C.N.P. presenting with left wrist pain.    - Mechanism of Injury:    -  Fall from tree stand few weeks ago and used her right wrist to catch her fall. No left hand/wrist involvement at that time  - Pertinent history and prior evaluations:    -2024 ED visit:     -2024 xray left wrist is Negative for acute left wrist fracture or dislocation. There are small  foci of mineralization along the radial aspect of the radiocarpal  joint. These are nonspecific but could reflect small intra-articular  bodies or calcium hydroxyapatite deposition (calcific periarthritis).  MRI could better assess.    - Pain Character:    - Location:  Left radial wrist pain and thumb  - Character:  aching  - Duration:  1 week approximately. 4 days of pain, 3 days of relief  - Course:  improved since Monday  - Endorses:    -  some pain, swelling over radial wrist that has diminished  - Denies:    - clicking/popping, grinding, mechanical locking symptoms, instability, numbness, tingling, radicular shooting pain, weakness  - Alleviating factors:    -  bracing , time  - Aggravating factors:    - initially wrist extension and use of her thumb. 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       Review of Systems  Musculoskeletal: as above  Remainder of review of systems is negative including constitutional, CV, pulmonary, GI, Skin and Neurologic except as noted in HPI or medical history.    Past Medical History:   Diagnosis Date    Gastro-oesophageal reflux disease     Heartburn during pregnancy in third trimester 2017     Hypertension     Lactose intolerance     Motion sickness     PIH (pregnancy induced hypertension) 2017    Rh(-), needs rhogam at 28 wks gestation 2017    S/P  section 2017     Past Surgical History:   Procedure Laterality Date     SECTION N/A 2017    Procedure:  SECTION;   Section;  Surgeon: Zechariah Mcdonald MD;  [facility]    COLONOSCOPY N/A 2023    Procedure: COLONOSCOPY, WITH POLYPECTOMY AND BIOPSY;  Surgeon: Ashvin Collier DO;  [facility]    HC EXCIS PRIMARY GANGLION WRIST  3/24/2004    Excision, volar wrist ganglion, left.    HC EXCIS RECURRENT GANGLION WRIST  2006    Recurrent volar wrist ganglion, left.    LAPAROSCOPIC CHOLECYSTECTOMY N/A 2024    Procedure: CHOLECYSTECTOMY, ROBOT-ASSISTED, LAPAROSCOPIC, USING DA [place] XI;  Surgeon: Ashvin Collier DO;  [facility]    OPEN REDUCTION INTERNAL FIXATION WRIST      Cadavar bone     Family History   Problem Relation Age of Onset    Lipids Mother     Musculoskeletal Disorder Mother         ms    Allergies Mother     Depression Mother     Genitourinary Problems Mother     Respiratory Mother         CPAP for sleep apnea    Multiple Sclerosis Mother     Pulmonary Embolism Mother         secondary to flying long periods of time and a recent fracture    Hypertension Father     Lipids Father     Ulcerative Colitis Father     Allergies Sister         2nd oldest    Asthma Sister         oldest    Psychotic Disorder Sister         depression (s/p an amputation at age 3)    Respiratory Sister         CPAP for sleep apnea    Diabetes Sister         adult-onset    Post-Traumatic Stress Disorder (PTSD) Brother         service connected    Depression Brother         4th oldest    Crohn's Disease Brother     Inflammatory Bowel Disease Brother 38        Chron's    Hypertension Brother         3rd oldest    Cerebrovascular Disease Maternal Grandmother     Cancer Maternal  "Grandmother         ovarian    Gynecology Maternal Grandmother         ovarian ca    Hypertension Paternal Grandmother     Hypertension Paternal Grandfather     Diabetes Maternal Uncle         type 2         Objective  Ht 1.676 m (5' 6\")   Wt 120.7 kg (266 lb)   BMI 42.93 kg/m      General: healthy, alert and in no acute distress.    HEENT: no scleral icterus or conjunctival erythema.   Skin: no suspicious lesions or rash. No jaundice.   CV: regular rhythm by palpation, 2+ distal pulses.  Resp: normal respiratory effort without conversational dyspnea.   Psych: normal mood and affect.    Gait: nonantalgic, appropriate coordination and balance.     Neuro:        - Sensation to light touch:    - Intact throughout the BUE including all peripheral nerve distributions.     MSK - Wrist/Hand:        - Inspection:    -Mild swelling of the radial wrist without surrounding erythema, warmth, ecchymosis, lesion, or atrophy noted.        - ROM:    - Full AROM/PROM without pain today throughout the hand/wrist       - Palpation:    - NTTP throughout the hand/wrist today       - Strength:  (*antalgic)   - Wrist Extension   5   - Wrist Flexion    5   - FDI     5   - ADM     5   - FPL     5   - APB     5   - EIP     5   - EDC     5   - APL/EPB    5            - Special tests:        - CMC grind:  Neg    - Finkelstein's:  Neg    - TFCC compression:  Neg    - Franco's:  Neg       Radiology  I independently reviewed the available relevant imaging in the chart with my interpretations as above in HPI.       Assessment  1. Acute calcific periarthritis        Teetee Arshad is a pleasant 39 year old female that presents with acute left wrist pain that started approximately 1 week ago and accounted for about 4 days of severe radial wrist pain that hurt worse with wrist motions and she was previously evaluated with radiographs that revealed calcific deposits within the wrist joint, directly adjacent to her area of pain.  There is " some swelling involved as well to.  After those 4 days, she had acute resolution of symptoms and has been relatively pain-free since Monday.  She continues to wear a thumb spica wrist brace for protection.  On exam, she has full strength and function of her hand/wrist without pain, nontender as well. History and physical exam appear most consistent with acute calcific periarthritis of the left wrist that has resolved.  - We discussed that this is typically a self-limited process and her pain pattern has been consistent with typical timing.  I do not expect her to have an exacerbation of pain, but if she does she knows that she can use the thumb spica brace and oral and topical NSAIDs for 1 to 2 weeks until pain goes away again.  She is more than welcome to follow-up in the future as needed for reevaluation.      Hilario Olvera DO, [place]  [facility]  [facility] - Department of Orthopedic Surgery       Disclaimer:  This note was prepared and written using Dragon Medical dictation software. As a result, there may be errors in the script that have gone undetected. Please consider this when interpreting the information in this note.

## 2024-11-27 ENCOUNTER — OFFICE VISIT (OUTPATIENT)
Dept: ORTHOPEDICS | Facility: CLINIC | Age: 39
End: 2024-11-27
Attending: NURSE PRACTITIONER
Payer: COMMERCIAL

## 2024-11-27 VITALS — BODY MASS INDEX: 42.75 KG/M2 | HEIGHT: 66 IN | WEIGHT: 266 LBS

## 2024-11-27 DIAGNOSIS — M77.9 ACUTE CALCIFIC PERIARTHRITIS: Primary | ICD-10-CM

## 2024-11-27 PROCEDURE — 99204 OFFICE O/P NEW MOD 45 MIN: CPT | Performed by: STUDENT IN AN ORGANIZED HEALTH CARE EDUCATION/TRAINING PROGRAM

## 2024-11-27 ASSESSMENT — PAIN SCALES - GENERAL: PAINLEVEL_OUTOF10: NO PAIN (1)

## 2024-11-27 NOTE — LETTER
2024      Shari Arshad  [address] [facility] [address]      Dear Colleague,    Thank you for referring your patient, Shari Arshad, to the [facility] [place]. Please see a copy of my visit note below.    Shari Arshad  :  1985  DOS: 2024  MRN: [id number]  PCP: Pratibha Merino    Sports Medicine Clinic Visit      HPI  Shari Arshad is a 39 year old female who is seen in consultation at the request of  Connie Steward C.N.P. presenting with left wrist pain.    - Mechanism of Injury:    -  Fall from tree stand few weeks ago and used her right wrist to catch her fall. No left hand/wrist involvement at that time  - Pertinent history and prior evaluations:    -2024 ED visit:     -2024 xray left wrist is Negative for acute left wrist fracture or dislocation. There are small  foci of mineralization along the radial aspect of the radiocarpal  joint. These are nonspecific but could reflect small intra-articular  bodies or calcium hydroxyapatite deposition (calcific periarthritis).  MRI could better assess.    - Pain Character:    - Location:  Left radial wrist pain and thumb  - Character:  aching  - Duration:  1 week approximately. 4 days of pain, 3 days of relief  - Course:  improved since Monday  - Endorses:    -  some pain, swelling over radial wrist that has diminished  - Denies:    - clicking/popping, grinding, mechanical locking symptoms, instability, numbness, tingling, radicular shooting pain, weakness  - Alleviating factors:    -  bracing , time  - Aggravating factors:    - initially wrist extension and use of her thumb. 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       Review of Systems  Musculoskeletal: as above  Remainder of review of systems is negative  including constitutional, CV, pulmonary, GI, Skin and Neurologic except as noted in HPI or medical history.    Past Medical History:   Diagnosis Date     Gastro-oesophageal reflux disease      Heartburn during pregnancy in third trimester 2017     Hypertension      Lactose intolerance      Motion sickness      PIH (pregnancy induced hypertension) 2017     Rh(-), needs rhogam at 28 wks gestation 2017     S/P  section 2017     Past Surgical History:   Procedure Laterality Date      SECTION N/A 2017    Procedure:  SECTION;   Section;  Surgeon: Zechariah Mcdonald MD;  Location: [facility] OR     COLONOSCOPY N/A 2023    Procedure: COLONOSCOPY, WITH POLYPECTOMY AND BIOPSY;  Surgeon: Ashvin Collier DO;  Location: [facility] GI     HC EXCIS PRIMARY GANGLION WRIST  3/24/2004    Excision, volar wrist ganglion, left.     HC EXCIS RECURRENT GANGLION WRIST  2006    Recurrent volar wrist ganglion, left.     LAPAROSCOPIC CHOLECYSTECTOMY N/A 2024    Procedure: CHOLECYSTECTOMY, ROBOT-ASSISTED, LAPAROSCOPIC, USING DA [place] XI;  Surgeon: Ashvin Collier DO;  Location: [facility] OR     OPEN REDUCTION INTERNAL FIXATION WRIST      [place]avar bone     Family History   Problem Relation Age of Onset     Lipids Mother      Musculoskeletal Disorder Mother         ms     Allergies Mother      Depression Mother      Genitourinary Problems Mother      Respiratory Mother         CPAP for sleep apnea     Multiple Sclerosis Mother      Pulmonary Embolism Mother         secondary to flying long periods of time and a recent fracture     Hypertension Father      Lipids Father      Ulcerative Colitis Father      Allergies Sister         2nd oldest     Asthma Sister         oldest     Psychotic Disorder Sister         depression (s/p an amputation at age 3)     Respiratory Sister         CPAP for sleep apnea     Diabetes Sister         adult-onset     Post-Traumatic Stress Disorder  "(PTSD) Brother         service connected     Depression Brother         4th oldest     Crohn's Disease Brother      Inflammatory Bowel Disease Brother 38        Chron's     Hypertension Brother         3rd oldest     Cerebrovascular Disease Maternal Grandmother      Cancer Maternal Grandmother         ovarian     Gynecology Maternal Grandmother         ovarian ca     Hypertension Paternal Grandmother      Hypertension Paternal Grandfather      Diabetes Maternal Uncle         type 2         Objective  Ht 1.676 m (5' 6\")   Wt 120.7 kg (266 lb)   BMI 42.93 kg/m      General: healthy, alert and in no acute distress.    HEENT: no scleral icterus or conjunctival erythema.   Skin: no suspicious lesions or rash. No jaundice.   CV: regular rhythm by palpation, 2+ distal pulses.  Resp: normal respiratory effort without conversational dyspnea.   Psych: normal mood and affect.    Gait: nonantalgic, appropriate coordination and balance.     Neuro:        - Sensation to light touch:    - Intact throughout the BUE including all peripheral nerve distributions.     MSK - Wrist/Hand:        - Inspection:    -Mild swelling of the radial wrist without surrounding erythema, warmth, ecchymosis, lesion, or atrophy noted.        - ROM:    - Full AROM/PROM without pain today throughout the hand/wrist       - Palpation:    - NTTP throughout the hand/wrist today       - Strength:  (*antalgic)   - Wrist Extension   5   - Wrist Flexion    5   - FDI     5   - ADM     5   - FPL     5   - APB     5   - EIP     5   - EDC     5   - APL/EPB    5            - Special tests:        - CMC grind:  Neg    - Finkelstein's:  Neg    - TFCC compression:  Neg    - Franco's:  Neg       Radiology  I independently reviewed the available relevant imaging in the chart with my interpretations as above in HPI.       Assessment  1. Acute calcific periarthritis        Teetee Arshad is a pleasant 39 year old female that presents with acute left wrist pain " that started approximately 1 week ago and accounted for about 4 days of severe radial wrist pain that hurt worse with wrist motions and she was previously evaluated with radiographs that revealed calcific deposits within the wrist joint, directly adjacent to her area of pain.  There is some swelling involved as well to.  After those 4 days, she had acute resolution of symptoms and has been relatively pain-free since Monday.  She continues to wear a thumb spica wrist brace for protection.  On exam, she has full strength and function of her hand/wrist without pain, nontender as well. History and physical exam appear most consistent with acute calcific periarthritis of the left wrist that has resolved.  - We discussed that this is typically a self-limited process and her pain pattern has been consistent with typical timing.  I do not expect her to have an exacerbation of pain, but if she does she knows that she can use the thumb spica brace and oral and topical NSAIDs for 1 to 2 weeks until pain goes away again.  She is more than welcome to follow-up in the future as needed for reevaluation.      Hilario Olvera DO, CAQSM  [facility]  [facility] - Department of Orthopedic Surgery       Disclaimer:  This note was prepared and written using Dragon Medical dictation software. As a result, there may be errors in the script that have gone undetected. Please consider this when interpreting the information in this note.        Again, thank you for allowing me to participate in the care of your patient.        Sincerely,        Hilario Olvera DO

## 2024-12-10 ENCOUNTER — OFFICE VISIT (OUTPATIENT)
Dept: INTERNAL MEDICINE | Facility: CLINIC | Age: 39
End: 2024-12-10
Payer: COMMERCIAL

## 2024-12-10 VITALS
BODY MASS INDEX: 42.23 KG/M2 | DIASTOLIC BLOOD PRESSURE: 69 MMHG | TEMPERATURE: 97.5 F | WEIGHT: 262.8 LBS | SYSTOLIC BLOOD PRESSURE: 103 MMHG | HEIGHT: 66 IN | RESPIRATION RATE: 12 BRPM | HEART RATE: 64 BPM | OXYGEN SATURATION: 97 %

## 2024-12-10 DIAGNOSIS — E66.01 MORBID OBESITY (H): ICD-10-CM

## 2024-12-10 DIAGNOSIS — F41.1 GAD (GENERALIZED ANXIETY DISORDER): ICD-10-CM

## 2024-12-10 DIAGNOSIS — E78.5 HYPERLIPIDEMIA LDL GOAL <130: Primary | ICD-10-CM

## 2024-12-10 DIAGNOSIS — Z12.31 ENCOUNTER FOR SCREENING MAMMOGRAM FOR BREAST CANCER: ICD-10-CM

## 2024-12-10 LAB
ALBUMIN SERPL BCG-MCNC: 4.3 G/DL (ref 3.5–5.2)
ALBUMIN UR-MCNC: NEGATIVE MG/DL
ALP SERPL-CCNC: 91 U/L (ref 40–150)
ALT SERPL W P-5'-P-CCNC: 20 U/L (ref 0–50)
ANION GAP SERPL CALCULATED.3IONS-SCNC: 10 MMOL/L (ref 7–15)
APPEARANCE UR: CLEAR
AST SERPL W P-5'-P-CCNC: 17 U/L (ref 0–45)
BILIRUB SERPL-MCNC: <0.2 MG/DL
BILIRUB UR QL STRIP: NEGATIVE
BUN SERPL-MCNC: 15.7 MG/DL (ref 6–20)
CALCIUM SERPL-MCNC: 9.1 MG/DL (ref 8.8–10.4)
CHLORIDE SERPL-SCNC: 101 MMOL/L (ref 98–107)
CHOLEST SERPL-MCNC: 220 MG/DL
COLOR UR AUTO: NORMAL
CREAT SERPL-MCNC: 0.92 MG/DL (ref 0.51–0.95)
EGFRCR SERPLBLD CKD-EPI 2021: 80 ML/MIN/1.73M2
ERYTHROCYTE [DISTWIDTH] IN BLOOD BY AUTOMATED COUNT: 13.2 % (ref 10–15)
FASTING STATUS PATIENT QL REPORTED: NO
FASTING STATUS PATIENT QL REPORTED: NO
GLUCOSE SERPL-MCNC: 90 MG/DL (ref 70–99)
GLUCOSE UR STRIP-MCNC: NEGATIVE MG/DL
HCO3 SERPL-SCNC: 26 MMOL/L (ref 22–29)
HCT VFR BLD AUTO: 40.8 % (ref 35–47)
HDLC SERPL-MCNC: 49 MG/DL
HGB BLD-MCNC: 14 G/DL (ref 11.7–15.7)
HGB UR QL STRIP: NEGATIVE
KETONES UR STRIP-MCNC: NEGATIVE MG/DL
LDLC SERPL CALC-MCNC: 113 MG/DL
LEUKOCYTE ESTERASE UR QL STRIP: NEGATIVE
MCH RBC QN AUTO: 30.4 PG (ref 26.5–33)
MCHC RBC AUTO-ENTMCNC: 34.3 G/DL (ref 31.5–36.5)
MCV RBC AUTO: 89 FL (ref 78–100)
NITRATE UR QL: NEGATIVE
NONHDLC SERPL-MCNC: 171 MG/DL
PH UR STRIP: 5.5 [PH] (ref 5–7)
PLATELET # BLD AUTO: 269 10E3/UL (ref 150–450)
POTASSIUM SERPL-SCNC: 3.6 MMOL/L (ref 3.4–5.3)
PROT SERPL-MCNC: 7 G/DL (ref 6.4–8.3)
RBC # BLD AUTO: 4.6 10E6/UL (ref 3.8–5.2)
SODIUM SERPL-SCNC: 137 MMOL/L (ref 135–145)
SP GR UR STRIP: 1.03 (ref 1–1.03)
TRIGL SERPL-MCNC: 291 MG/DL
UROBILINOGEN UR STRIP-MCNC: NORMAL MG/DL
WBC # BLD AUTO: 11.3 10E3/UL (ref 4–11)

## 2024-12-10 PROCEDURE — 99214 OFFICE O/P EST MOD 30 MIN: CPT | Mod: 25 | Performed by: INTERNAL MEDICINE

## 2024-12-10 PROCEDURE — 80061 LIPID PANEL: CPT | Performed by: INTERNAL MEDICINE

## 2024-12-10 PROCEDURE — 36415 COLL VENOUS BLD VENIPUNCTURE: CPT | Performed by: INTERNAL MEDICINE

## 2024-12-10 PROCEDURE — 80053 COMPREHEN METABOLIC PANEL: CPT | Performed by: INTERNAL MEDICINE

## 2024-12-10 PROCEDURE — 81003 URINALYSIS AUTO W/O SCOPE: CPT | Performed by: INTERNAL MEDICINE

## 2024-12-10 PROCEDURE — 99396 PREV VISIT EST AGE 40-64: CPT | Performed by: INTERNAL MEDICINE

## 2024-12-10 PROCEDURE — 85027 COMPLETE CBC AUTOMATED: CPT | Performed by: INTERNAL MEDICINE

## 2024-12-10 SDOH — HEALTH STABILITY: PHYSICAL HEALTH: ON AVERAGE, HOW MANY MINUTES DO YOU ENGAGE IN EXERCISE AT THIS LEVEL?: 0 MIN

## 2024-12-10 SDOH — HEALTH STABILITY: PHYSICAL HEALTH: ON AVERAGE, HOW MANY DAYS PER WEEK DO YOU ENGAGE IN MODERATE TO STRENUOUS EXERCISE (LIKE A BRISK WALK)?: 0 DAYS

## 2024-12-10 ASSESSMENT — PAIN SCALES - GENERAL: PAINLEVEL_OUTOF10: NO PAIN (0)

## 2024-12-10 ASSESSMENT — SOCIAL DETERMINANTS OF HEALTH (SDOH): HOW OFTEN DO YOU GET TOGETHER WITH FRIENDS OR RELATIVES?: TWICE A WEEK

## 2024-12-10 NOTE — PROGRESS NOTES
"  Assessment & Plan     Routine medical examination and a health care facility.      Hyperlipidemia LDL goal <130  Check liver function and lipids.  - Comprehensive metabolic panel (BMP + Alb, Alk Phos, ALT, AST, Total. Bili, TP); Future  - Lipid panel reflex to direct LDL Fasting; Future  - Comprehensive metabolic panel (BMP + Alb, Alk Phos, ALT, AST, Total. Bili, TP)  - Lipid panel reflex to direct LDL Fasting    Benign essential hypertension, postpartum    Patient occasionally has episodes of orthostatic hypotension on her current medication and as well as having significant bradycardia with pulse rates down into the upper 30s.  Recommend decreasing metoprolol to 50 mg daily and recheck in the upcoming couple weeks.  Check renal function.  - CBC with platelets; Future  - UA Macroscopic with reflex to Microscopic and Culture - Lab Collect; Future  - CBC with platelets  - UA Macroscopic with reflex to Microscopic and Culture - Lab Collect    Morbid obesity (H)  Patient currently working with dietitian and aggressively losing weight.      Encounter for screening mammogram for breast cancer    - MA Screening Bilateral w/ Hua; Future    [place]  Patient will continue SSRI.  She has had excellent results with this and notes that also helped with some obsessive-compulsive thoughts.    Patient has been advised of split billing requirements and indicates understanding: Yes        BMI  Estimated body mass index is 42.42 kg/m  as calculated from the following:    Height as of this encounter: 1.676 m (5' 6\").    Weight as of this encounter: 119.2 kg (262 lb 12.8 oz).   Weight management plan: Discussed healthy diet and exercise guidelines Specific weight management program called dietary education discussed    Counseling  Appropriate preventive services were addressed with this patient via screening, questionnaire, or discussion as appropriate for fall prevention, nutrition, physical activity, Tobacco-use cessation, social " "engagement, weight loss and cognition.  Checklist reviewing preventive services available has been given to the patient.  Reviewed patient's diet, addressing concerns and/or questions.       MEDICATIONS:  Continue current medications without change  Work on weight loss  Regular exercise    Cali Mccarthy is a 39 year old, presenting for the following health issues:  Physical      12/10/2024     2:32 PM   Additional Questions   Roomed by Selma WILKERSON         12/10/2024   Forms   Any forms needing to be completed Yes        HPI               Review of Systems  CONSTITUTIONAL: NEGATIVE for fever, chills, change in weight  INTEGUMENTARY/SKIN: NEGATIVE for worrisome rashes, moles or lesions  EYES: NEGATIVE for vision changes or irritation  ENT/MOUTH: NEGATIVE for ear, mouth and throat problems  RESP: NEGATIVE for significant cough or SOB  BREAST: NEGATIVE for masses, tenderness or discharge  CV: NEGATIVE for chest pain, palpitations or peripheral edema  GI: NEGATIVE for nausea, abdominal pain, heartburn, or change in bowel habits  : NEGATIVE for frequency, dysuria, or hematuria  MUSCULOSKELETAL: NEGATIVE for significant arthralgias or myalgia  NEURO: NEGATIVE for weakness, dizziness or paresthesias  ENDOCRINE: NEGATIVE for temperature intolerance, skin/hair changes  HEME: NEGATIVE for bleeding problems  PSYCHIATRIC: NEGATIVE for changes in mood or affect      Objective    /69   Pulse 64   Temp 97.5  F (36.4  C) (Temporal)   Resp 12   Ht 1.676 m (5' 6\")   Wt 119.2 kg (262 lb 12.8 oz)   LMP 11/23/2024 (Exact Date)   SpO2 97%   BMI 42.42 kg/m    Body mass index is 42.42 kg/m .  Physical Exam   GENERAL: alert, no distress, and obese  EYES: Eyes grossly normal to inspection, PERRL and conjunctivae and sclerae normal  HENT: ear canals and TM's normal, nose and mouth without ulcers or lesions  NECK: no adenopathy, no asymmetry, masses, or scars  RESP: lungs clear to auscultation - no rales, rhonchi or " wheezes  CV: regular rate and rhythm, normal S1 S2, no S3 or S4, no murmur, click or rub, no peripheral edema  ABDOMEN: soft, nontender, no hepatosplenomegaly, no masses and bowel sounds normal  MS: no gross musculoskeletal defects noted, no edema  SKIN: no suspicious lesions or rashes  NEURO: Normal strength and tone, mentation intact and speech normal  PSYCH: mentation appears normal, affect normal/bright  LYMPH: no cervical, supraclavicular, axillary, or inguinal adenopathy        I have reviewed the patient's vaccination schedule and discussed the benefits of prophylactic vaccination in detail.  I recommend the patient contact their pharmacist for vaccinations.  Discussed that most insurance companies now favor reimbursement to the pharmacies and it will financially behoove the patient to have vaccinations performed at their pharmacy.            Signed Electronically by: Alfonso Milner DO

## 2024-12-11 ENCOUNTER — PATIENT OUTREACH (OUTPATIENT)
Dept: CARE COORDINATION | Facility: CLINIC | Age: 39
End: 2024-12-11
Payer: COMMERCIAL

## 2024-12-21 ENCOUNTER — HEALTH MAINTENANCE LETTER (OUTPATIENT)
Age: 39
End: 2024-12-21

## 2024-12-21 DIAGNOSIS — N94.3 PMS (PREMENSTRUAL SYNDROME): ICD-10-CM

## 2024-12-23 ENCOUNTER — HOSPITAL ENCOUNTER (OUTPATIENT)
Dept: MAMMOGRAPHY | Facility: CLINIC | Age: 39
Discharge: HOME | End: 2024-12-23
Attending: INTERNAL MEDICINE | Admitting: INTERNAL MEDICINE
Payer: COMMERCIAL

## 2024-12-23 DIAGNOSIS — Z12.31 ENCOUNTER FOR SCREENING MAMMOGRAM FOR BREAST CANCER: ICD-10-CM

## 2024-12-23 PROCEDURE — 77063 BREAST TOMOSYNTHESIS BI: CPT

## 2024-12-23 PROCEDURE — 77067 SCR MAMMO BI INCL CAD: CPT

## 2024-12-23 RX ORDER — FLUOXETINE 10 MG/1
10 CAPSULE ORAL DAILY
Qty: 90 CAPSULE | Refills: 1 | Status: SHIPPED | OUTPATIENT
Start: 2024-12-23

## 2025-02-11 DIAGNOSIS — J30.1 SEASONAL ALLERGIC RHINITIS DUE TO POLLEN: ICD-10-CM

## 2025-02-11 RX ORDER — MONTELUKAST SODIUM 10 MG/1
1 TABLET ORAL
Qty: 90 TABLET | Refills: 2 | Status: SHIPPED | OUTPATIENT
Start: 2025-02-11

## 2025-03-31 ENCOUNTER — MYC REFILL (OUTPATIENT)
Dept: FAMILY MEDICINE | Facility: CLINIC | Age: 40
End: 2025-03-31
Payer: COMMERCIAL

## 2025-03-31 DIAGNOSIS — R12 HEARTBURN: ICD-10-CM

## 2025-03-31 RX ORDER — OMEPRAZOLE 20 MG/1
CAPSULE, DELAYED RELEASE ORAL
Qty: 90 CAPSULE | Refills: 1 | Status: SHIPPED | OUTPATIENT
Start: 2025-03-31

## 2025-06-04 DIAGNOSIS — I10 BENIGN ESSENTIAL HYPERTENSION: ICD-10-CM

## 2025-06-04 RX ORDER — HYDROCHLOROTHIAZIDE 25 MG/1
25 TABLET ORAL DAILY
Qty: 90 TABLET | Refills: 0 | Status: SHIPPED | OUTPATIENT
Start: 2025-06-04

## 2025-06-16 DIAGNOSIS — N94.3 PMS (PREMENSTRUAL SYNDROME): ICD-10-CM

## 2025-06-16 DIAGNOSIS — I10 BENIGN ESSENTIAL HYPERTENSION: ICD-10-CM

## 2025-06-16 RX ORDER — FLUOXETINE 10 MG/1
10 CAPSULE ORAL DAILY
Qty: 90 CAPSULE | Refills: 1 | Status: SHIPPED | OUTPATIENT
Start: 2025-06-16

## 2025-06-16 RX ORDER — LISINOPRIL 20 MG/1
20 TABLET ORAL DAILY
Qty: 90 TABLET | Refills: 0 | Status: SHIPPED | OUTPATIENT
Start: 2025-06-16

## 2025-08-11 ENCOUNTER — TRANSCRIBE ORDERS (OUTPATIENT)
Dept: OTHER | Age: 40
End: 2025-08-11

## 2025-08-11 DIAGNOSIS — S89.92XA INJURY OF LIGAMENT OF LEFT KNEE, INITIAL ENCOUNTER: Primary | ICD-10-CM

## 2025-08-18 ENCOUNTER — HOSPITAL ENCOUNTER (OUTPATIENT)
Dept: MRI IMAGING | Facility: CLINIC | Age: 40
Discharge: HOME | End: 2025-08-18
Attending: FAMILY MEDICINE | Admitting: FAMILY MEDICINE
Payer: COMMERCIAL

## 2025-08-18 DIAGNOSIS — S89.92XA INJURY OF LEFT KNEE, INITIAL ENCOUNTER: ICD-10-CM

## 2025-08-18 PROCEDURE — 73721 MRI JNT OF LWR EXTRE W/O DYE: CPT | Mod: 26 | Performed by: RADIOLOGY

## 2025-08-18 PROCEDURE — 73721 MRI JNT OF LWR EXTRE W/O DYE: CPT | Mod: LT

## 2025-08-20 ENCOUNTER — PATIENT OUTREACH (OUTPATIENT)
Dept: CARE COORDINATION | Facility: CLINIC | Age: 40
End: 2025-08-20
Payer: COMMERCIAL

## 2025-08-25 ENCOUNTER — PRE VISIT (OUTPATIENT)
Dept: ORTHOPEDICS | Facility: CLINIC | Age: 40
End: 2025-08-25

## 2025-08-25 ENCOUNTER — TELEPHONE (OUTPATIENT)
Dept: ORTHOPEDICS | Facility: CLINIC | Age: 40
End: 2025-08-25

## 2025-08-25 ENCOUNTER — OFFICE VISIT (OUTPATIENT)
Dept: ORTHOPEDICS | Facility: CLINIC | Age: 40
End: 2025-08-25
Attending: FAMILY MEDICINE
Payer: COMMERCIAL

## 2025-08-25 VITALS — BODY MASS INDEX: 42.11 KG/M2 | WEIGHT: 262 LBS | HEIGHT: 66 IN

## 2025-08-25 DIAGNOSIS — S89.92XA INJURY OF LEFT KNEE, INITIAL ENCOUNTER: Primary | ICD-10-CM

## 2025-08-25 DIAGNOSIS — S89.92XA INJURY OF LEFT KNEE, INITIAL ENCOUNTER: ICD-10-CM

## 2025-08-25 PROCEDURE — 99204 OFFICE O/P NEW MOD 45 MIN: CPT | Mod: GC | Performed by: ORTHOPAEDIC SURGERY

## 2025-08-26 PROBLEM — S89.92XA INJURY OF LEFT KNEE, INITIAL ENCOUNTER: Status: ACTIVE | Noted: 2025-08-25

## (undated) DEVICE — ANTIFOG SOLUTION SEE SHARP 150M TROCAR SWABS 30978

## (undated) DEVICE — GLOVE PROTEXIS W/NEU-THERA 7.5  2D73TE75

## (undated) DEVICE — GLOVE UNDER INDICATOR PI SZ 6.5 LF 41665

## (undated) DEVICE — DAVINCI XI DRAPE ARM 470015

## (undated) DEVICE — SOL WATER IRRIG 1000ML BOTTLE 2F7114

## (undated) DEVICE — SU MONOCRYL 0 CT-1 36" UND Y946H

## (undated) DEVICE — SYR 50ML SLIP TIP W/O NDL 309654

## (undated) DEVICE — GLOVE BIOGEL PI INDICATOR 8.0 LF 41680

## (undated) DEVICE — DRSG TEGADERM 6X8" 1628

## (undated) DEVICE — DAVINCI XI SEAL UNIVERSAL 5-12MM 470500

## (undated) DEVICE — SOL NACL 0.9% IRRIG 1000ML BOTTLE 07138-09

## (undated) DEVICE — DEVICE ACTIVE LAP PLUME FILTERATION PUREVIEW 620030100

## (undated) DEVICE — GOWN XLG DISP 9545

## (undated) DEVICE — DAVINCI XI OBTURATOR BLADELESS 8MM 470359

## (undated) DEVICE — DRSG ABDOMINAL 07 1/2X8" 7197D

## (undated) DEVICE — SU MONOCRYL 4-0 PS-2 18" UND Y496G

## (undated) DEVICE — SU DERMABOND PROPEN .5ML DPP6

## (undated) DEVICE — DEVICE SUTURE PASSER 14GA WECK EFX EFXSP2

## (undated) DEVICE — PACK GENERAL LAPAOSCOPY

## (undated) DEVICE — CATH TRAY FOLEY 16FR SIL

## (undated) DEVICE — CLAMP CORD

## (undated) DEVICE — STPL SKIN SUBCUTICULAR INSORB 2025

## (undated) DEVICE — SU VICRYL 0 CT-1 36" J346H

## (undated) DEVICE — SU PLAIN 0 FN-2 27" N864H

## (undated) DEVICE — PREP CHLORAPREP 26ML TINTED ORANGE  260815

## (undated) DEVICE — DAVINCI XI DRAPE COLUMN 470341

## (undated) DEVICE — SU DERMABOND ADVANCED .7ML DNX12

## (undated) DEVICE — ENDO POUCH UNIVERSAL RETRIEVAL SYSTEM INZII 5MM CD003

## (undated) DEVICE — CLIP ENDO HEMO-LOC GREEN MED/LG 544230

## (undated) DEVICE — KIT ENDO TURNOVER/PROCEDURE CARRY-ON 101822

## (undated) DEVICE — NDL INSUFFLATION 120MM VERRES 172015

## (undated) DEVICE — GLOVE BIOGEL PI ULTRATOUCH G SZ 7.5 42175

## (undated) DEVICE — TUBING SUCTION 6"X3/16" N56A

## (undated) DEVICE — GLOVE BIOGEL PI ULTRATOUCH G SZ 6.5 42165

## (undated) DEVICE — PACK C-SECTION

## (undated) RX ORDER — DEXAMETHASONE SODIUM PHOSPHATE 10 MG/ML
INJECTION, SOLUTION INTRAMUSCULAR; INTRAVENOUS
Status: DISPENSED
Start: 2024-06-28

## (undated) RX ORDER — LIDOCAINE HYDROCHLORIDE 10 MG/ML
INJECTION, SOLUTION EPIDURAL; INFILTRATION; INTRACAUDAL; PERINEURAL
Status: DISPENSED
Start: 2024-06-28

## (undated) RX ORDER — MORPHINE SULFATE 1 MG/ML
INJECTION, SOLUTION EPIDURAL; INTRATHECAL; INTRAVENOUS
Status: DISPENSED
Start: 2017-08-29

## (undated) RX ORDER — BUPIVACAINE HYDROCHLORIDE AND EPINEPHRINE 2.5; 5 MG/ML; UG/ML
INJECTION, SOLUTION EPIDURAL; INFILTRATION; INTRACAUDAL; PERINEURAL
Status: DISPENSED
Start: 2024-06-28

## (undated) RX ORDER — GLYCOPYRROLATE 0.2 MG/ML
INJECTION, SOLUTION INTRAMUSCULAR; INTRAVENOUS
Status: DISPENSED
Start: 2024-06-28

## (undated) RX ORDER — ONDANSETRON 2 MG/ML
INJECTION INTRAMUSCULAR; INTRAVENOUS
Status: DISPENSED
Start: 2024-06-28

## (undated) RX ORDER — PROPOFOL 10 MG/ML
INJECTION, EMULSION INTRAVENOUS
Status: DISPENSED
Start: 2024-06-28

## (undated) RX ORDER — KETOROLAC TROMETHAMINE 30 MG/ML
INJECTION, SOLUTION INTRAMUSCULAR; INTRAVENOUS
Status: DISPENSED
Start: 2024-06-28

## (undated) RX ORDER — HYDROMORPHONE HYDROCHLORIDE 1 MG/ML
INJECTION, SOLUTION INTRAMUSCULAR; INTRAVENOUS; SUBCUTANEOUS
Status: DISPENSED
Start: 2024-06-28

## (undated) RX ORDER — FENTANYL CITRATE 50 UG/ML
INJECTION, SOLUTION INTRAMUSCULAR; INTRAVENOUS
Status: DISPENSED
Start: 2024-06-28